# Patient Record
Sex: FEMALE | Race: WHITE | NOT HISPANIC OR LATINO | Employment: OTHER | URBAN - METROPOLITAN AREA
[De-identification: names, ages, dates, MRNs, and addresses within clinical notes are randomized per-mention and may not be internally consistent; named-entity substitution may affect disease eponyms.]

---

## 2018-06-22 ENCOUNTER — OFFICE VISIT (OUTPATIENT)
Dept: FAMILY MEDICINE CLINIC | Facility: CLINIC | Age: 63
End: 2018-06-22

## 2018-06-22 VITALS
BODY MASS INDEX: 21.67 KG/M2 | HEIGHT: 68 IN | TEMPERATURE: 98.4 F | SYSTOLIC BLOOD PRESSURE: 130 MMHG | WEIGHT: 143 LBS | HEART RATE: 76 BPM | OXYGEN SATURATION: 98 % | DIASTOLIC BLOOD PRESSURE: 72 MMHG | RESPIRATION RATE: 16 BRPM

## 2018-06-22 DIAGNOSIS — Z00.00 LABORATORY EXAMINATION ORDERED AS PART OF A ROUTINE GENERAL MEDICAL EXAMINATION: ICD-10-CM

## 2018-06-22 DIAGNOSIS — R09.89 ABDOMINAL BRUIT: Primary | ICD-10-CM

## 2018-06-22 DIAGNOSIS — Z12.31 ENCOUNTER FOR SCREENING MAMMOGRAM FOR BREAST CANCER: ICD-10-CM

## 2018-06-22 DIAGNOSIS — R09.89 BRUIT OF LEFT CAROTID ARTERY: ICD-10-CM

## 2018-06-22 DIAGNOSIS — M79.601 PAIN IN BOTH UPPER EXTREMITIES: ICD-10-CM

## 2018-06-22 DIAGNOSIS — M79.602 PAIN IN BOTH UPPER EXTREMITIES: ICD-10-CM

## 2018-06-22 DIAGNOSIS — R31.21 ASYMPTOMATIC MICROSCOPIC HEMATURIA: ICD-10-CM

## 2018-06-22 DIAGNOSIS — Z78.0 POST-MENOPAUSAL: ICD-10-CM

## 2018-06-22 DIAGNOSIS — Z72.0 TOBACCO ABUSE: ICD-10-CM

## 2018-06-22 PROCEDURE — 90472 IMMUNIZATION ADMIN EACH ADD: CPT | Performed by: PHYSICIAN ASSISTANT

## 2018-06-22 PROCEDURE — 90471 IMMUNIZATION ADMIN: CPT | Performed by: PHYSICIAN ASSISTANT

## 2018-06-22 PROCEDURE — 99204 OFFICE O/P NEW MOD 45 MIN: CPT | Performed by: PHYSICIAN ASSISTANT

## 2018-06-22 PROCEDURE — 90732 PPSV23 VACC 2 YRS+ SUBQ/IM: CPT | Performed by: PHYSICIAN ASSISTANT

## 2018-06-22 PROCEDURE — 90632 HEPA VACCINE ADULT IM: CPT | Performed by: PHYSICIAN ASSISTANT

## 2018-06-22 NOTE — PATIENT INSTRUCTIONS
Low glycemic index diet  Exercise 30 minutes most days of the week  Make sure you get results on any labs or tests we ordered today  We discussed medications and how to take them as prescribed  Sleep 6-8 hours each night if possible  If you have not signed up for [employer], please activate your code ASAP from your After Visit Summary today    LDL goal <100  LDL goal if heart disease <70  HDL goal >60  Triglyceride goal <150  BP goal =<130/80  Fasting glucose <100

## 2018-06-22 NOTE — PROGRESS NOTES
Subjective   Nelsy Stokes is a 63 y.o. female.     History of Present Illness   Nelsy Stokes 63 y.o. female who presents today for a new patient appointment.    she has a history of There is no problem list on file for this patient.  .  she is here to re-establish care I reviewed the PFSH recorded today by my MA/LPN staff.   she   She has been feeling tired.  Past A1C 5.9  I see notes in [facility]; saw Dr Harris  Saw abn pap 2011 and did see GYN and had work up  Labs last 10-8-11;  , HDL 71, creat .93, glucose 91, AST 15, ALT 13, TSH 1.87  hgb 15.5  Past EKG 9-23-13 and stress test Dr Null    DATE: 09/23/2013    CLINICAL INDICATION(S): Chest pain.     Resting EKG showed: Sinus rhythm. Minor ST segment changes.   1. This test was performed pharmacologically due to inability to walk on   treadmill.   2. The patient was infused with 0.4 milligrams of IV Lexiscan over 10-20   seconds per protocol.  3. The patient's maximum heart rate was 108 beats per minute with a   resting heart rate of 63 beats per minute.  4. Blood pressure fluctuated from 112/70 (resting BP) to 132/68 (peak BP).  5. Symptoms: The patient denied any complaints of chest discomfort   throughout the pharmacological test.   6. Arrhythmia: Occasional APC and PVC.   7. At peak infusion, no significant ST changes were noted from baseline   EKG.    IMPRESSION: Asymptomatic stress test without significant ST segment   evidence for ischemia.     MYOCARDIAL PERFUSION STUDY    TECHNIQUE: In order to evaluate Myocardial Perfusion, the following   combination of radionuclide(s) and exercise were utilized.    13 millicuries of Technetium-99m Sestamibi was injected intravenously at   rest and 38.4 millicuries of Technetium-99m Sestamibi was injected   intravenously at peak exercise. Images were then obtained, both at rest   and after stress, utilizing standard SPECT  (Tomographic) imaging techniques.    INTERPRETATION: The quality of the study is good.    Rotating raw data: Shows no motion artifact.     SPECT analysis: Shows normal left ventricular cavity size. There is   homogeneous uptake of Cardiolite in all regions of the myocardium with no   change noted between the rest and stress views.     Polar Maps: Show no fixed and no reversible perfusion defects.     Gated wall motion: Shows normal wall motion and normal wall thickening.     Post-stress resting ejection fraction: Gated analysis demonstrates a   post-stress resting ejection fraction of 73%.    IMPRESSION:   1. Normal IV Lexiscan myocardial perfusion study without evidence of   ischemia or infarction.   2. Normal systolic fResting EKG showed: Sinus rhythm. Minor ST segment changes. unction with normal ejection fraction 73%.      Resting EKG showed: Sinus rhythm. Minor ST segment changes.     She has sore arms and tender to touch; ROM not worse;  This is better off from work 6 weeks  No burn; no N/T; some improvement in strength--not much on exam and want her to see Dr Francis    Sore area right lumbar to touch and ROM    I will also get cpk  The following portions of the patient's history were reviewed and updated as appropriate: allergies, current medications, past family history, past medical history, past social history, past surgical history and problem list.    Review of Systems   Constitutional: Positive for fatigue. Negative for activity change, appetite change and unexpected weight change.   HENT: Negative for nosebleeds and trouble swallowing.    Eyes: Negative for pain and visual disturbance.   Respiratory: Positive for cough and shortness of breath. Negative for chest tightness and wheezing.    Cardiovascular: Negative for chest pain and palpitations.   Gastrointestinal: Positive for abdominal pain. Negative for blood in stool.   Endocrine: Negative.    Genitourinary: Negative for difficulty urinating and hematuria.   Musculoskeletal: Positive for arthralgias, back pain and myalgias.  Negative for joint swelling.   Skin: Negative for color change and rash.   Allergic/Immunologic: Negative.    Neurological: Positive for numbness. Negative for syncope and speech difficulty.   Hematological: Negative for adenopathy. Bruises/bleeds easily.   Psychiatric/Behavioral: Negative for agitation and confusion. The patient is nervous/anxious.    All other systems reviewed and are negative.      Objective   Physical Exam   Constitutional: She is oriented to person, place, and time. She appears well-developed and well-nourished. No distress.   HENT:   Head: Normocephalic and atraumatic.   Right Ear: External ear normal.   Left Ear: External ear normal.   Nose: Nose normal.   Mouth/Throat: Oropharynx is clear and moist.   Eyes: Conjunctivae and EOM are normal. Pupils are equal, round, and reactive to light. Right eye exhibits no discharge. Left eye exhibits no discharge. No scleral icterus.   Neck: Normal range of motion. Neck supple. No tracheal deviation present. No thyromegaly present.   Cardiovascular: Normal rate, regular rhythm, normal heart sounds, intact distal pulses and normal pulses.  Exam reveals no gallop.    No murmur heard.  Left carotid bruit    I hear bruit abd aorta also    Pulmonary/Chest: Effort normal and breath sounds normal. No respiratory distress. She has no wheezes. She has no rales.   Abdominal: Soft. Bowel sounds are normal. She exhibits no mass.   Musculoskeletal: Normal range of motion.   Lymphadenopathy:     She has no cervical adenopathy.   Neurological: She is alert and oriented to person, place, and time. She exhibits normal muscle tone. Coordination normal.   Skin: Skin is warm. No rash noted. No erythema. No pallor.   Psychiatric: She has a normal mood and affect. Her behavior is normal. Judgment and thought content normal.   Nursing note and vitals reviewed.      Assessment/Plan   Problems Addressed this Visit     None      Visit Diagnoses     Abdominal bruit    -  Primary     Relevant Orders    Comprehensive metabolic panel    Lipid panel    CBC and Differential    TSH    Urinalysis With Microscopic - Urine, Clean Catch    Vitamin B12    Folate    Vitamin D 25 Hydroxy    CK    Hemoglobin A1c    Mammo Screening Bilateral With CAD    DEXA Bone Density Axial    Duplex Carotid Ultrasound CAR    Duplex Abdominal Aorta & Iliac Artery Limited CAR    Bruit of left carotid artery        Relevant Orders    Comprehensive metabolic panel    Lipid panel    CBC and Differential    TSH    Urinalysis With Microscopic - Urine, Clean Catch    Vitamin B12    Folate    Vitamin D 25 Hydroxy    CK    Hemoglobin A1c    Mammo Screening Bilateral With CAD    DEXA Bone Density Axial    Duplex Carotid Ultrasound CAR    Duplex Abdominal Aorta & Iliac Artery Limited CAR    Laboratory examination ordered as part of a routine general medical examination        Relevant Orders    Comprehensive metabolic panel    Lipid panel    CBC and Differential    TSH    Urinalysis With Microscopic - Urine, Clean Catch    Vitamin B12    Folate    Vitamin D 25 Hydroxy    CK    Hemoglobin A1c    Mammo Screening Bilateral With CAD    DEXA Bone Density Axial    Duplex Carotid Ultrasound CAR    Duplex Abdominal Aorta & Iliac Artery Limited CAR    Pain in both upper extremities        Relevant Orders    Comprehensive metabolic panel    Lipid panel    CBC and Differential    TSH    Urinalysis With Microscopic - Urine, Clean Catch    Vitamin B12    Folate    Vitamin D 25 Hydroxy    CK    Hemoglobin A1c    Mammo Screening Bilateral With CAD    DEXA Bone Density Axial    Duplex Carotid Ultrasound CAR    Duplex Abdominal Aorta & Iliac Artery Limited CAR    Encounter for screening mammogram for breast cancer        Relevant Orders    Comprehensive metabolic panel    Lipid panel    CBC and Differential    TSH    Urinalysis With Microscopic - Urine, Clean Catch    Vitamin B12    Folate    Vitamin D 25 Hydroxy    CK    Hemoglobin A1c    Mammo  Screening Bilateral With CAD    DEXA Bone Density Axial    Duplex Carotid Ultrasound CAR    Duplex Abdominal Aorta & Iliac Artery Limited CAR    Post-menopausal        Relevant Orders    Comprehensive metabolic panel    Lipid panel    CBC and Differential    TSH    Urinalysis With Microscopic - Urine, Clean Catch    Vitamin B12    Folate    Vitamin D 25 Hydroxy    CK    Hemoglobin A1c    Mammo Screening Bilateral With CAD    DEXA Bone Density Axial    Duplex Carotid Ultrasound CAR    Duplex Abdominal Aorta & Iliac Artery Limited CAR    Tobacco abuse

## 2018-06-26 LAB
25(OH)D3+25(OH)D2 SERPL-MCNC: 44.5 NG/ML (ref 30–100)
ALBUMIN SERPL-MCNC: 4.3 G/DL (ref 3.5–5.2)
ALBUMIN/GLOB SERPL: 2.2 G/DL
ALP SERPL-CCNC: 80 U/L (ref 39–117)
ALT SERPL-CCNC: 17 U/L (ref 1–33)
APPEARANCE UR: (no result)
AST SERPL-CCNC: 14 U/L (ref 1–32)
BACTERIA #/AREA URNS HPF: ABNORMAL /HPF
BASOPHILS # BLD AUTO: 0.06 10*3/MM3 (ref 0–0.2)
BASOPHILS NFR BLD AUTO: 0.9 % (ref 0–1.5)
BILIRUB SERPL-MCNC: 0.8 MG/DL (ref 0.1–1.2)
BILIRUB UR QL STRIP: NEGATIVE
BUN SERPL-MCNC: 13 MG/DL (ref 8–23)
BUN/CREAT SERPL: 13.5 (ref 7–25)
CALCIUM SERPL-MCNC: 9.6 MG/DL (ref 8.6–10.5)
CASTS URNS MICRO: ABNORMAL
CHLORIDE SERPL-SCNC: 103 MMOL/L (ref 98–107)
CHOLEST SERPL-MCNC: 221 MG/DL (ref 0–200)
CK SERPL-CCNC: 77 U/L (ref 20–180)
CO2 SERPL-SCNC: 27.7 MMOL/L (ref 22–29)
COLOR UR: (no result)
CREAT SERPL-MCNC: 0.96 MG/DL (ref 0.57–1)
EOSINOPHIL # BLD AUTO: 0.13 10*3/MM3 (ref 0–0.7)
EOSINOPHIL NFR BLD AUTO: 1.9 % (ref 0.3–6.2)
EPI CELLS #/AREA URNS HPF: ABNORMAL /HPF
ERYTHROCYTE [DISTWIDTH] IN BLOOD BY AUTOMATED COUNT: 13.7 % (ref 11.7–13)
FOLATE SERPL-MCNC: 9.07 NG/ML (ref 4.78–24.2)
GLOBULIN SER CALC-MCNC: 2 GM/DL
GLUCOSE SERPL-MCNC: 93 MG/DL (ref 65–99)
GLUCOSE UR QL: NEGATIVE
HBA1C MFR BLD: 5.96 % (ref 4.8–5.6)
HCT VFR BLD AUTO: 48.1 % (ref 35.6–45.5)
HDLC SERPL-MCNC: 66 MG/DL (ref 40–60)
HGB BLD-MCNC: 15.9 G/DL (ref 11.9–15.5)
HGB UR QL STRIP: (no result)
IMM GRANULOCYTES # BLD: 0.01 10*3/MM3 (ref 0–0.03)
IMM GRANULOCYTES NFR BLD: 0.1 % (ref 0–0.5)
KETONES UR QL STRIP: NEGATIVE
LDLC SERPL CALC-MCNC: 144 MG/DL (ref 0–100)
LEUKOCYTE ESTERASE UR QL STRIP: (no result)
LYMPHOCYTES # BLD AUTO: 2.54 10*3/MM3 (ref 0.9–4.8)
LYMPHOCYTES NFR BLD AUTO: 37.5 % (ref 19.6–45.3)
MCH RBC QN AUTO: 32.3 PG (ref 26.9–32)
MCHC RBC AUTO-ENTMCNC: 33.1 G/DL (ref 32.4–36.3)
MCV RBC AUTO: 97.8 FL (ref 80.5–98.2)
MONOCYTES # BLD AUTO: 0.42 10*3/MM3 (ref 0.2–1.2)
MONOCYTES NFR BLD AUTO: 6.2 % (ref 5–12)
NEUTROPHILS # BLD AUTO: 3.63 10*3/MM3 (ref 1.9–8.1)
NEUTROPHILS NFR BLD AUTO: 53.5 % (ref 42.7–76)
NITRITE UR QL STRIP: NEGATIVE
PH UR STRIP: 7 [PH] (ref 5–8)
PLATELET # BLD AUTO: 308 10*3/MM3 (ref 140–500)
POTASSIUM SERPL-SCNC: 4.8 MMOL/L (ref 3.5–5.2)
PROT SERPL-MCNC: 6.3 G/DL (ref 6–8.5)
PROT UR QL STRIP: (no result)
RBC # BLD AUTO: 4.92 10*6/MM3 (ref 3.9–5.2)
RBC #/AREA URNS HPF: ABNORMAL /HPF
SODIUM SERPL-SCNC: 144 MMOL/L (ref 136–145)
SP GR UR: 1.02 (ref 1–1.03)
TRIGL SERPL-MCNC: 55 MG/DL (ref 0–150)
TSH SERPL DL<=0.005 MIU/L-ACNC: 1.9 MIU/ML (ref 0.27–4.2)
UROBILINOGEN UR STRIP-MCNC: (no result) MG/DL
VIT B12 SERPL-MCNC: 455 PG/ML (ref 211–946)
VLDLC SERPL CALC-MCNC: 11 MG/DL (ref 5–40)
WBC # BLD AUTO: 6.78 10*3/MM3 (ref 4.5–10.7)
WBC #/AREA URNS HPF: ABNORMAL /HPF

## 2018-06-27 NOTE — PROGRESS NOTES
PT states no UTI symptoms, she is having back pain but she also has 2 slipped disk. She also said she is having some side pain, both have been going on for a while

## 2018-07-06 ENCOUNTER — HOSPITAL ENCOUNTER (OUTPATIENT)
Dept: ULTRASOUND IMAGING | Facility: HOSPITAL | Age: 63
Discharge: HOME | End: 2018-07-06

## 2018-07-06 ENCOUNTER — APPOINTMENT (OUTPATIENT)
Dept: CARDIOLOGY | Facility: HOSPITAL | Age: 63
End: 2018-07-06

## 2018-07-06 ENCOUNTER — HOSPITAL ENCOUNTER (OUTPATIENT)
Dept: CARDIOLOGY | Facility: HOSPITAL | Age: 63
Discharge: HOME | End: 2018-07-06

## 2018-07-06 ENCOUNTER — HOSPITAL ENCOUNTER (OUTPATIENT)
Dept: BONE DENSITY | Facility: HOSPITAL | Age: 63
Discharge: HOME | End: 2018-07-06

## 2018-07-06 ENCOUNTER — HOSPITAL ENCOUNTER (OUTPATIENT)
Dept: MAMMOGRAPHY | Facility: HOSPITAL | Age: 63
Discharge: HOME | End: 2018-07-06
Admitting: PHYSICIAN ASSISTANT

## 2018-07-06 DIAGNOSIS — Z00.00 LABORATORY EXAMINATION ORDERED AS PART OF A ROUTINE GENERAL MEDICAL EXAMINATION: ICD-10-CM

## 2018-07-06 DIAGNOSIS — Z78.0 POST-MENOPAUSAL: ICD-10-CM

## 2018-07-06 DIAGNOSIS — R09.89 ABDOMINAL BRUIT: ICD-10-CM

## 2018-07-06 DIAGNOSIS — M79.602 PAIN IN BOTH UPPER EXTREMITIES: ICD-10-CM

## 2018-07-06 DIAGNOSIS — M79.601 PAIN IN BOTH UPPER EXTREMITIES: ICD-10-CM

## 2018-07-06 DIAGNOSIS — Z12.31 ENCOUNTER FOR SCREENING MAMMOGRAM FOR BREAST CANCER: ICD-10-CM

## 2018-07-06 DIAGNOSIS — Z72.0 TOBACCO ABUSE: ICD-10-CM

## 2018-07-06 DIAGNOSIS — R09.89 BRUIT OF LEFT CAROTID ARTERY: ICD-10-CM

## 2018-07-06 DIAGNOSIS — I65.23 BILATERAL CAROTID ARTERY STENOSIS: Primary | ICD-10-CM

## 2018-07-06 LAB
BH CV XLRA MEAS LEFT CCA RATIO VEL: 53.1 CM/SEC
BH CV XLRA MEAS LEFT DIST CCA EDV: 10.9 CM/SEC
BH CV XLRA MEAS LEFT DIST CCA PSV: 53.1 CM/SEC
BH CV XLRA MEAS LEFT DIST ICA EDV: -21.2 CM/SEC
BH CV XLRA MEAS LEFT DIST ICA PSV: -66.5 CM/SEC
BH CV XLRA MEAS LEFT ICA RATIO VEL: -443 CM/SEC
BH CV XLRA MEAS LEFT ICA/CCA RATIO: -8.3
BH CV XLRA MEAS LEFT MID ICA EDV: -47.4 CM/SEC
BH CV XLRA MEAS LEFT MID ICA PSV: -193 CM/SEC
BH CV XLRA MEAS LEFT PROX CCA EDV: 11.3 CM/SEC
BH CV XLRA MEAS LEFT PROX CCA PSV: 75 CM/SEC
BH CV XLRA MEAS LEFT PROX ECA EDV: -8.5 CM/SEC
BH CV XLRA MEAS LEFT PROX ECA PSV: -90.6 CM/SEC
BH CV XLRA MEAS LEFT PROX ICA EDV: -177 CM/SEC
BH CV XLRA MEAS LEFT PROX ICA PSV: -443 CM/SEC
BH CV XLRA MEAS LEFT PROX SCLA PSV: 72.9 CM/SEC
BH CV XLRA MEAS LEFT VERTEBRAL A PSV: -81.4 CM/SEC
BH CV XLRA MEAS RIGHT CCA RATIO VEL: 82.1 CM/SEC
BH CV XLRA MEAS RIGHT DIST CCA EDV: 19.1 CM/SEC
BH CV XLRA MEAS RIGHT DIST CCA PSV: 82.1 CM/SEC
BH CV XLRA MEAS RIGHT DIST ICA EDV: -27.6 CM/SEC
BH CV XLRA MEAS RIGHT DIST ICA PSV: -102 CM/SEC
BH CV XLRA MEAS RIGHT ICA RATIO VEL: -110 CM/SEC
BH CV XLRA MEAS RIGHT ICA/CCA RATIO: -1.3
BH CV XLRA MEAS RIGHT MID ICA PSV: -103 CM/SEC
BH CV XLRA MEAS RIGHT PROX CCA EDV: 19.8 CM/SEC
BH CV XLRA MEAS RIGHT PROX CCA PSV: 94.8 CM/SEC
BH CV XLRA MEAS RIGHT PROX ECA EDV: -5.7 CM/SEC
BH CV XLRA MEAS RIGHT PROX ECA PSV: -80 CM/SEC
BH CV XLRA MEAS RIGHT PROX ICA EDV: -31.1 CM/SEC
BH CV XLRA MEAS RIGHT PROX ICA PSV: -110 CM/SEC
BH CV XLRA MEAS RIGHT PROX SCLA PSV: 133 CM/SEC
BH CV XLRA MEAS RIGHT VERTEBRAL A EDV: 29.6 CM/SEC
BH CV XLRA MEAS RIGHT VERTEBRAL A PSV: 148 CM/SEC
LEFT ARM BP: NORMAL MMHG
RIGHT ARM BP: NORMAL MMHG

## 2018-07-06 PROCEDURE — 77067 SCR MAMMO BI INCL CAD: CPT

## 2018-07-06 PROCEDURE — 77080 DXA BONE DENSITY AXIAL: CPT

## 2018-07-06 PROCEDURE — 76775 US EXAM ABDO BACK WALL LIM: CPT

## 2018-07-06 PROCEDURE — 93880 EXTRACRANIAL BILAT STUDY: CPT

## 2018-07-06 NOTE — PROGRESS NOTES
Bilateral carotid doppler complete and preliminary is positive for severe left carotid disease and mild right carotid disease.to Dr. Harris pt. To f/u with the office next week . Pt released asymptomatic lt carotid bruit

## 2018-07-08 PROBLEM — R31.21 ASYMPTOMATIC MICROSCOPIC HEMATURIA: Status: ACTIVE | Noted: 2018-07-08

## 2018-07-08 LAB
APPEARANCE UR: CLEAR
BACTERIA #/AREA URNS HPF: ABNORMAL /HPF
BACTERIA UR CULT: NORMAL
BACTERIA UR CULT: NORMAL
BILIRUB UR QL STRIP: NEGATIVE
CASTS URNS MICRO: ABNORMAL
COLOR UR: YELLOW
EPI CELLS #/AREA URNS HPF: ABNORMAL /HPF
GLUCOSE UR QL: NEGATIVE
HGB UR QL STRIP: (no result)
KETONES UR QL STRIP: NEGATIVE
LEUKOCYTE ESTERASE UR QL STRIP: (no result)
NITRITE UR QL STRIP: NEGATIVE
PH UR STRIP: 7.5 [PH] (ref 5–8)
PROT UR QL STRIP: NEGATIVE
RBC #/AREA URNS HPF: ABNORMAL /HPF
SP GR UR: 1.02 (ref 1–1.03)
UROBILINOGEN UR STRIP-MCNC: (no result) MG/DL
WBC #/AREA URNS HPF: ABNORMAL /HPF

## 2018-07-12 ENCOUNTER — TRANSCRIBE ORDERS (OUTPATIENT)
Dept: ADMINISTRATIVE | Facility: HOSPITAL | Age: 63
End: 2018-07-12

## 2018-07-12 DIAGNOSIS — I65.29 CAROTID ARTERY STENOSIS, UNILATERAL: Primary | ICD-10-CM

## 2018-07-19 ENCOUNTER — HOSPITAL ENCOUNTER (OUTPATIENT)
Dept: CT IMAGING | Facility: HOSPITAL | Age: 63
Discharge: HOME | End: 2018-07-19
Attending: SURGERY | Admitting: SURGERY

## 2018-07-19 DIAGNOSIS — I65.29 CAROTID ARTERY STENOSIS, UNILATERAL: ICD-10-CM

## 2018-07-19 LAB — CREAT BLDA-MCNC: 0.9 MG/DL (ref 0.6–1.3)

## 2018-07-19 PROCEDURE — 70496 CT ANGIOGRAPHY HEAD: CPT

## 2018-07-19 PROCEDURE — 70498 CT ANGIOGRAPHY NECK: CPT

## 2018-07-19 PROCEDURE — 25010000002 IOPAMIDOL 61 % SOLUTION: Performed by: SURGERY

## 2018-07-19 PROCEDURE — 82565 ASSAY OF CREATININE: CPT

## 2018-07-19 RX ADMIN — IOPAMIDOL 95 ML: 612 INJECTION, SOLUTION INTRAVENOUS at 09:45

## 2018-07-20 ENCOUNTER — OFFICE VISIT (OUTPATIENT)
Dept: FAMILY MEDICINE CLINIC | Facility: CLINIC | Age: 63
End: 2018-07-20

## 2018-07-20 VITALS
OXYGEN SATURATION: 99 % | HEART RATE: 74 BPM | RESPIRATION RATE: 16 BRPM | TEMPERATURE: 98.6 F | HEIGHT: 68 IN | SYSTOLIC BLOOD PRESSURE: 110 MMHG | BODY MASS INDEX: 21.98 KG/M2 | WEIGHT: 145 LBS | DIASTOLIC BLOOD PRESSURE: 70 MMHG

## 2018-07-20 DIAGNOSIS — E78.2 MIXED HYPERLIPIDEMIA: Primary | ICD-10-CM

## 2018-07-20 DIAGNOSIS — M85.89 OSTEOPENIA OF MULTIPLE SITES: ICD-10-CM

## 2018-07-20 DIAGNOSIS — I73.9 PAD (PERIPHERAL ARTERY DISEASE) (HCC): ICD-10-CM

## 2018-07-20 DIAGNOSIS — R31.21 ASYMPTOMATIC MICROSCOPIC HEMATURIA: ICD-10-CM

## 2018-07-20 DIAGNOSIS — I77.9 BILATERAL CAROTID ARTERY DISEASE (HCC): ICD-10-CM

## 2018-07-20 PROBLEM — I77.1 SUBCLAVIAN ARTERIAL STENOSIS: Status: ACTIVE | Noted: 2018-07-20

## 2018-07-20 PROCEDURE — 99213 OFFICE O/P EST LOW 20 MIN: CPT | Performed by: PHYSICIAN ASSISTANT

## 2018-07-20 PROCEDURE — 90750 HZV VACC RECOMBINANT IM: CPT | Performed by: PHYSICIAN ASSISTANT

## 2018-07-20 PROCEDURE — 90471 IMMUNIZATION ADMIN: CPT | Performed by: PHYSICIAN ASSISTANT

## 2018-07-20 RX ORDER — CLOPIDOGREL BISULFATE 75 MG/1
75 TABLET ORAL DAILY
COMMUNITY
End: 2020-02-27 | Stop reason: SINTOL

## 2018-07-20 RX ORDER — ROSUVASTATIN CALCIUM 10 MG/1
10 TABLET, COATED ORAL DAILY
Qty: 30 TABLET | Refills: 11 | Status: SHIPPED | OUTPATIENT
Start: 2018-07-20 | End: 2018-08-15 | Stop reason: SINTOL

## 2018-07-20 NOTE — PROGRESS NOTES
Subjective   Nelsy Stokes is a 63 y.o. female.     History of Present Illness   Nelsy Stokes 63 y.o. female who presents today for routine follow up check and medication refills.  she has a history of   Patient Active Problem List   Diagnosis   • Asymptomatic microscopic hematuria   • Subclavian arterial stenosis (CMS/HCC)   • PAD (peripheral artery disease) (CMS/HCC)   • Bilateral carotid artery disease (CMS/HCC)   • Mixed hyperlipidemia   • Osteopenia of multiple sites   .  Since the last visit, she has overall felt fairly well.  She has Hyperlipidemia and here to discuss treatment.  she has been compliant with current medications have reviewed them.  The patient denies medication side effects.    Results for orders placed or performed during the hospital encounter of 18   POC Creatinine   Result Value Ref Range    Creatinine 0.90 0.60 - 1.30 mg/dL       I see notes from DR. Leong and did fax him to abd aorta doppler results that are not in his note.  Also eval carotid disease and PAD lower ext; has appt 18;  He wants me to start a statin and watch her bp.  I did labs and her urine was repeated and showed blood and to see Dr Norton.  She is smoker;  Dad  and much going on now.  Lab Results   Component Value Date    CHLPL 221 (H) 2018    TRIG 55 2018    HDL 66 (H) 2018     (H) 2018     Lab Results   Component Value Date    BUN 13 2018    CREATININE 0.90 2018    EGFRIFNONA 59 (L) 2018    EGFRIFAFRI 71 2018    BCR 13.5 2018    K 4.8 2018    CO2 27.7 2018    CALCIUM 9.6 2018    PROTENTOTREF 6.3 2018    ALBUMIN 4.30 2018    LABIL2 2.2 2018    AST 14 2018    ALT 17 2018     Lab Results   Component Value Date    HGB 15.9 (H) 2018    HCT 48.1 (H) 2018    MCV 97.8 2018     2018     Lab Results   Component Value Date    TSH 1.900 2018     Dr. Leong stopped ASA  d/t bruising and on Plavix; still bruising  Will consider colonoscopy  Just had mammo  Talked about low dose CT scan lung  Also needs pap  DEXA showed Osteopenia and do not want to Rx Evista with her vascular issues;  Want her to read about Fosamax and DEXA in one year to check  The following portions of the patient's history were reviewed and updated as appropriate: allergies, current medications, past family history, past medical history, past social history, past surgical history and problem list.    Review of Systems   Constitutional: Negative for activity change, appetite change and unexpected weight change.   HENT: Negative for nosebleeds and trouble swallowing.    Eyes: Negative for pain and visual disturbance.   Respiratory: Negative for chest tightness, shortness of breath and wheezing.    Cardiovascular: Negative for chest pain and palpitations.   Gastrointestinal: Negative for abdominal pain and blood in stool.   Endocrine: Negative.    Genitourinary: Negative for difficulty urinating and hematuria.   Musculoskeletal: Negative for joint swelling.   Skin: Negative for color change and rash.   Allergic/Immunologic: Negative.    Neurological: Negative for syncope and speech difficulty.   Hematological: Negative for adenopathy.   Psychiatric/Behavioral: Negative for agitation and confusion.   All other systems reviewed and are negative.      Objective   Physical Exam   Constitutional: She is oriented to person, place, and time. She appears well-developed and well-nourished. No distress.   HENT:   Head: Normocephalic and atraumatic.   Eyes: Pupils are equal, round, and reactive to light. Conjunctivae and EOM are normal. Right eye exhibits no discharge. Left eye exhibits no discharge. No scleral icterus.   Neck: Normal range of motion. Neck supple. No tracheal deviation present. No thyromegaly present.   Cardiovascular: Normal rate, regular rhythm, normal heart sounds, intact distal pulses and normal pulses.   Exam reveals no gallop.    No murmur heard.  Left carotid bruit        Pulmonary/Chest: Effort normal and breath sounds normal. No respiratory distress. She has no wheezes. She has no rales.   Abdominal: Soft. Bowel sounds are normal. She exhibits no mass.   Lymphadenopathy:     She has no cervical adenopathy.   Neurological: She is alert and oriented to person, place, and time. She exhibits normal muscle tone. Coordination normal.   Skin: Skin is warm. No rash noted. No erythema. No pallor.   Psychiatric: Her behavior is normal. Judgment and thought content normal.   Nursing note and vitals reviewed.      Assessment/Plan   Nelsy was seen today for anxiety.    Diagnoses and all orders for this visit:    Mixed hyperlipidemia  -     Comprehensive metabolic panel; Future  -     Lipid panel; Future  -     Hepatitis C Antibody; Future    Asymptomatic microscopic hematuria  -     Comprehensive metabolic panel; Future  -     Lipid panel; Future  -     Hepatitis C Antibody; Future    Bilateral carotid artery disease (CMS/HCC)  -     Comprehensive metabolic panel; Future  -     Lipid panel; Future  -     Hepatitis C Antibody; Future    PAD (peripheral artery disease) (CMS/HCC)  -     Comprehensive metabolic panel; Future  -     Lipid panel; Future  -     Hepatitis C Antibody; Future    Osteopenia of multiple sites    Other orders  -     Shingrix Vaccine  -     rosuvastatin (CRESTOR) 10 MG tablet; Take 1 tablet by mouth Daily. For cholesterol

## 2018-07-20 NOTE — PATIENT INSTRUCTIONS
Low glycemic index diet  Exercise 30 minutes most days of the week  Make sure you get results on any labs or tests we ordered today  We discussed medications and how to take them as prescribed  Sleep 6-8 hours each night if possible  If you have not signed up for [employer], please activate your code ASAP from your After Visit Summary today    LDL goal <100  LDL goal if heart disease <70  HDL goal >60  Triglyceride goal <150  BP goal =<130/80  Fasting glucose <100

## 2018-07-30 ENCOUNTER — TELEPHONE (OUTPATIENT)
Dept: FAMILY MEDICINE CLINIC | Facility: CLINIC | Age: 63
End: 2018-07-30

## 2018-07-30 NOTE — TELEPHONE ENCOUNTER
Pt was in to see you on 7/20/18.. She is having increase stress and trouble sleeping. She is wanting to know if you will give her something for it.

## 2018-08-03 ENCOUNTER — OFFICE VISIT (OUTPATIENT)
Dept: FAMILY MEDICINE CLINIC | Facility: CLINIC | Age: 63
End: 2018-08-03

## 2018-08-03 VITALS
HEART RATE: 61 BPM | SYSTOLIC BLOOD PRESSURE: 120 MMHG | OXYGEN SATURATION: 98 % | RESPIRATION RATE: 16 BRPM | WEIGHT: 145 LBS | DIASTOLIC BLOOD PRESSURE: 80 MMHG | TEMPERATURE: 98 F | HEIGHT: 68 IN | BODY MASS INDEX: 21.98 KG/M2

## 2018-08-03 DIAGNOSIS — G47.00 INSOMNIA, UNSPECIFIED TYPE: ICD-10-CM

## 2018-08-03 DIAGNOSIS — I73.9 PAD (PERIPHERAL ARTERY DISEASE) (HCC): ICD-10-CM

## 2018-08-03 DIAGNOSIS — F41.9 ANXIETY: Primary | ICD-10-CM

## 2018-08-03 DIAGNOSIS — M85.89 OSTEOPENIA OF MULTIPLE SITES: ICD-10-CM

## 2018-08-03 PROCEDURE — 99214 OFFICE O/P EST MOD 30 MIN: CPT | Performed by: PHYSICIAN ASSISTANT

## 2018-08-03 RX ORDER — HYDROXYZINE PAMOATE 25 MG/1
CAPSULE ORAL
Qty: 60 CAPSULE | Refills: 1 | Status: SHIPPED | OUTPATIENT
Start: 2018-08-03 | End: 2020-02-11

## 2018-08-03 RX ORDER — ALENDRONATE SODIUM 70 MG/1
70 TABLET ORAL
Qty: 5 TABLET | Refills: 11 | Status: SHIPPED | OUTPATIENT
Start: 2018-08-03 | End: 2019-03-01 | Stop reason: SDUPTHER

## 2018-08-03 NOTE — PROGRESS NOTES
Subjective   Nelsy Stokes is a 63 y.o. female.     History of Present Illness   Nelsy Stokes female 63 y.o. who presents today for new eval of Insomnia and Anxiety.  She reports anxiety and sleep disturbance. Onset of symptoms was approximately several months ago.  She denies current suicidal and homicidal ideation. Risk factors are lifestyle of multiple roles and chronic illness.  Previous treatment includes none.  She complains of the following medication side effects: none.  The patient has had no previous counseling.. She does not have depression and anxiety seems situational.  Just not sleeping. I will have her try Vistaril for both  Grief over Dad dying  Stress with ? What to do on carotid disease and 90% occulusion  Sees DR Leong  I can try Trazodone if no help with Vistaril  Sister has Osteoporosis---she is on Fosamax;  Her DEXA was -2.1 left hip and will start Rx    Saw vascular last week  She is taking Crestor and Plavix;  I do have f/u labs planned        The following portions of the patient's history were reviewed and updated as appropriate: allergies, current medications, past family history, past medical history, past social history, past surgical history and problem list.    Review of Systems   Constitutional: Negative for activity change, appetite change and unexpected weight change.   HENT: Negative for nosebleeds and trouble swallowing.    Eyes: Negative for pain and visual disturbance.   Respiratory: Negative for chest tightness, shortness of breath and wheezing.    Cardiovascular: Negative for chest pain and palpitations.   Gastrointestinal: Negative for abdominal pain and blood in stool.   Endocrine: Negative.    Genitourinary: Negative for difficulty urinating and hematuria.   Musculoskeletal: Negative for joint swelling.   Skin: Negative for color change and rash.   Allergic/Immunologic: Negative.    Neurological: Negative for syncope and speech difficulty.   Hematological: Negative for  adenopathy.   Psychiatric/Behavioral: Negative for agitation and confusion.   All other systems reviewed and are negative.      Objective   Physical Exam   Constitutional: She is oriented to person, place, and time. She appears well-developed and well-nourished. No distress.   HENT:   Head: Normocephalic and atraumatic.   Eyes: Pupils are equal, round, and reactive to light. Conjunctivae and EOM are normal. Right eye exhibits no discharge. Left eye exhibits no discharge. No scleral icterus.   Neck: Normal range of motion. Neck supple. No tracheal deviation present. No thyromegaly present.   Cardiovascular: Normal rate, regular rhythm, normal heart sounds, intact distal pulses and normal pulses.  Exam reveals no gallop.    No murmur heard.  Left carotid bruit        Pulmonary/Chest: Effort normal and breath sounds normal. No respiratory distress. She has no wheezes. She has no rales.   Abdominal: Soft. Bowel sounds are normal. She exhibits no mass.   Lymphadenopathy:     She has no cervical adenopathy.   Neurological: She is alert and oriented to person, place, and time. She exhibits normal muscle tone. Coordination normal.   Skin: Skin is warm. No rash noted. No erythema. No pallor.   Psychiatric: Her behavior is normal. Judgment and thought content normal.   Nursing note and vitals reviewed.      Assessment/Plan   Problems Addressed this Visit        Cardiovascular and Mediastinum    PAD (peripheral artery disease) (CMS/HCC)       Musculoskeletal and Integument    Osteopenia of multiple sites      Other Visit Diagnoses     Anxiety    -  Primary    Insomnia, unspecified type

## 2018-08-03 NOTE — PATIENT INSTRUCTIONS
Low glycemic index diet  Exercise 30 minutes most days of the week  Make sure you get results on any labs or tests we ordered today  We discussed medications and how to take them as prescribed  Sleep 6-8 hours each night if possible  If you have not signed up for [employer], please activate your code ASAP from your After Visit Summary today    LDL goal <100  LDL goal if heart disease <70  HDL goal >60  Triglyceride goal <150  BP goal =<130/80  Fasting glucose <100    Warned patient that this medication can cause drowsiness and impair them operating machinery, including driving a car.  Caution is advised.

## 2018-08-15 ENCOUNTER — TELEPHONE (OUTPATIENT)
Dept: FAMILY MEDICINE CLINIC | Facility: CLINIC | Age: 63
End: 2018-08-15

## 2018-08-15 NOTE — TELEPHONE ENCOUNTER
Stop Crestor and have her try Livalo and see if tolerate; if not or $$, I will at least want to Rx Zetia; labs as planned

## 2018-10-11 NOTE — PROGRESS NOTES
Need to ask Dr Leong about stop and hold Plavix for 5 days to have colonoscopy?  Will send him message and hope he answers back.

## 2018-10-12 ENCOUNTER — RESULTS ENCOUNTER (OUTPATIENT)
Dept: FAMILY MEDICINE CLINIC | Facility: CLINIC | Age: 63
End: 2018-10-12

## 2018-10-12 DIAGNOSIS — R31.21 ASYMPTOMATIC MICROSCOPIC HEMATURIA: ICD-10-CM

## 2018-10-12 DIAGNOSIS — I73.9 PAD (PERIPHERAL ARTERY DISEASE) (HCC): ICD-10-CM

## 2018-10-12 DIAGNOSIS — I77.9 BILATERAL CAROTID ARTERY DISEASE (HCC): ICD-10-CM

## 2018-10-12 DIAGNOSIS — E78.2 MIXED HYPERLIPIDEMIA: ICD-10-CM

## 2018-10-19 LAB
ALBUMIN SERPL-MCNC: 4.7 G/DL (ref 3.5–5.2)
ALBUMIN/GLOB SERPL: 2 G/DL
ALP SERPL-CCNC: 86 U/L (ref 39–117)
ALT SERPL-CCNC: 20 U/L (ref 1–33)
AST SERPL-CCNC: 16 U/L (ref 1–32)
BILIRUB SERPL-MCNC: 0.7 MG/DL (ref 0.1–1.2)
BUN SERPL-MCNC: 12 MG/DL (ref 8–23)
BUN/CREAT SERPL: 13.2 (ref 7–25)
CALCIUM SERPL-MCNC: 9.6 MG/DL (ref 8.6–10.5)
CHLORIDE SERPL-SCNC: 103 MMOL/L (ref 98–107)
CHOLEST SERPL-MCNC: 202 MG/DL (ref 0–200)
CO2 SERPL-SCNC: 25.8 MMOL/L (ref 22–29)
CREAT SERPL-MCNC: 0.91 MG/DL (ref 0.57–1)
GLOBULIN SER CALC-MCNC: 2.3 GM/DL
GLUCOSE SERPL-MCNC: 96 MG/DL (ref 65–99)
HCV AB S/CO SERPL IA: <0.1 S/CO RATIO (ref 0–0.9)
HDLC SERPL-MCNC: 63 MG/DL (ref 40–60)
LDLC SERPL CALC-MCNC: 126 MG/DL (ref 0–100)
POTASSIUM SERPL-SCNC: 4.6 MMOL/L (ref 3.5–5.2)
PROT SERPL-MCNC: 7 G/DL (ref 6–8.5)
SODIUM SERPL-SCNC: 145 MMOL/L (ref 136–145)
TRIGL SERPL-MCNC: 65 MG/DL (ref 0–150)
VLDLC SERPL CALC-MCNC: 13 MG/DL (ref 5–40)

## 2018-10-23 ENCOUNTER — OFFICE VISIT (OUTPATIENT)
Dept: CARDIOLOGY | Facility: CLINIC | Age: 63
End: 2018-10-23

## 2018-10-23 VITALS
SYSTOLIC BLOOD PRESSURE: 120 MMHG | DIASTOLIC BLOOD PRESSURE: 82 MMHG | HEART RATE: 78 BPM | HEIGHT: 69 IN | WEIGHT: 155 LBS | BODY MASS INDEX: 22.96 KG/M2

## 2018-10-23 DIAGNOSIS — R94.31 ABNORMAL ECG: ICD-10-CM

## 2018-10-23 DIAGNOSIS — I65.23 BILATERAL CAROTID ARTERY STENOSIS: ICD-10-CM

## 2018-10-23 DIAGNOSIS — I70.0 AORTIC ATHEROSCLEROSIS (HCC): Chronic | ICD-10-CM

## 2018-10-23 DIAGNOSIS — I73.9 PAD (PERIPHERAL ARTERY DISEASE) (HCC): ICD-10-CM

## 2018-10-23 DIAGNOSIS — R06.02 SOB (SHORTNESS OF BREATH): ICD-10-CM

## 2018-10-23 DIAGNOSIS — Z01.810 PREOP CARDIOVASCULAR EXAM: Primary | ICD-10-CM

## 2018-10-23 DIAGNOSIS — I77.1 SUBCLAVIAN ARTERIAL STENOSIS (HCC): ICD-10-CM

## 2018-10-23 PROBLEM — I77.9 BILATERAL CAROTID ARTERY DISEASE: Chronic | Status: ACTIVE | Noted: 2018-07-20

## 2018-10-23 PROCEDURE — 93000 ELECTROCARDIOGRAM COMPLETE: CPT | Performed by: INTERNAL MEDICINE

## 2018-10-23 PROCEDURE — 99204 OFFICE O/P NEW MOD 45 MIN: CPT | Performed by: INTERNAL MEDICINE

## 2018-10-23 NOTE — PROGRESS NOTES
Subjective:     Encounter Date:10/23/2018      Patient ID: Nelsy Stokes is a 63 y.o. female.    Chief Complaint: preop cards, CVD  History of Present Illness    Dear Dr. Leong,    I had the pleasure of seeing this patient in the office today for initial evaluation and consultation.  I appreciate the you sent her to see us.  She comes in for assessment of cardiac risk prior to cerebrovascular surgery-she has severe left carotid stenosis.    She is not known to have any prior coronary artery disease.  She's been found to have bilateral cerebrovascular disease, subclavian arterial disease, and abdominal aortic moderate to severe atherosclerotic disease.  She does get short of breath easily with activity, she is thought that is probably because of her smoking.  She sometimes will get some jaw and throat discomfort, sometimes but not always associated when she is up and walking.  She's also had some pain between her shoulder blades that happens intermittently at times.  She has not noticed that it or takes a correlate with activity or exercise.  She does she know she has disc disease and thought that that was probably the cause.  She does not have any problem with indigestion or heartburn.  She does fatigue easily and that's been getting worse.  She does not have any shortness of breath at rest.  No orthopnea or PND.  Says she could probably walk at a steady pace for a couple of football fields but she really doesn't do any exercise now so she's not sure.  If she starts walking up a hill or stairs she gets short of breath very quickly.    This patient has not experienced any feeling of palpitations, tachycardia or heart racing and no presyncope or syncope.  There has not been any problems with dizziness or lighthead This patient has not been recently hospitalized for any reason.    This patient has no known cardiac history.  This patient has no history of coronary artery disease, congestive heart failure, rheumatic  fever, rheumatic heart disease, congenital heart disease or heart murmur.  This patient has never required invasive cardiovascular evaluation.    The following portions of the patient's history were reviewed and updated as appropriate: allergies, current medications, past family history, past medical history, past social history, past surgical history and problem list.    Past Medical History:   Diagnosis Date   • Acid reflux    • Anxiety    • Bradycardia    • Carotid artery disease (CMS/HCC)    • Carotid artery stenosis    • Chest pain    • Claudication (CMS/HCC)    • Hyperlipidemia    • Osteopenia    • Palpitations    • PVD (peripheral vascular disease) (CMS/HCC)    • Subclavian artery stenosis (CMS/HCC)    • Tobacco abuse        Past Surgical History:   Procedure Laterality Date   • KNEE SURGERY         Social History     Social History   • Marital status:      Spouse name: N/A   • Number of children: N/A   • Years of education: N/A     Occupational History   • Not on file.     Social History Main Topics   • Smoking status: Current Every Day Smoker     Packs/day: 1.00     Types: Cigarettes   • Smokeless tobacco: Never Used      Comment: caff use   • Alcohol use No   • Drug use: No   • Sexual activity: Yes     Partners: Male      Comment:      Other Topics Concern   • Not on file     Social History Narrative   • No narrative on file       Review of Systems   Constitution: Negative for chills, decreased appetite, fever and night sweats.   HENT: Negative for ear discharge, ear pain, hearing loss, nosebleeds and sore throat.    Eyes: Negative for blurred vision, double vision and pain.   Cardiovascular: Negative for cyanosis.   Respiratory: Negative for hemoptysis and sputum production.    Endocrine: Negative for cold intolerance and heat intolerance.   Hematologic/Lymphatic: Negative for adenopathy.   Skin: Negative for dry skin, itching, nail changes, rash and suspicious lesions.   Musculoskeletal:  "Negative for arthritis, gout, muscle cramps, muscle weakness, myalgias and neck pain.   Gastrointestinal: Negative for anorexia, bowel incontinence, constipation, diarrhea, dysphagia, hematemesis and jaundice.   Genitourinary: Negative for bladder incontinence, dysuria, flank pain, frequency, hematuria and nocturia.   Neurological: Negative for focal weakness, numbness, paresthesias and seizures.   Psychiatric/Behavioral: Negative for altered mental status, hallucinations, hypervigilance, suicidal ideas and thoughts of violence.   Allergic/Immunologic: Negative for persistent infections.         ECG 12 Lead  Date/Time: 10/23/2018 8:44 AM  Performed by: ANTHONY MARCOS III  Authorized by: ANTHONY MARCOS III   Comparison: compared with previous ECG   Similar to previous ECG  Rhythm: sinus rhythm  Rate: normal  Conduction: conduction normal  ST Segments: ST segments normal  T Waves: T waves normal  QRS axis: normal  Other findings: PRWP  Q waves: V3  Clinical impression: abnormal ECG               Objective:     Vitals:    10/23/18 0829   BP: 120/82   Pulse: 78   Weight: 70.3 kg (155 lb)   Height: 175.3 cm (69\")         Physical Exam   Constitutional: She is oriented to person, place, and time. She appears well-developed and well-nourished. No distress.   HENT:   Head: Normocephalic and atraumatic.   Nose: Nose normal.   Mouth/Throat: Oropharynx is clear and moist.   Eyes: Pupils are equal, round, and reactive to light. Conjunctivae and EOM are normal. Right eye exhibits no discharge. Left eye exhibits no discharge.   Neck: Normal range of motion. Neck supple. No tracheal deviation present. No thyromegaly present.   Cardiovascular: Normal rate, regular rhythm, S1 normal, S2 normal, normal heart sounds and normal pulses.  Exam reveals no S3.    Pulmonary/Chest: Effort normal and breath sounds normal. No stridor. No respiratory distress. She exhibits no tenderness.   Abdominal: Soft. Bowel sounds are normal. She " exhibits no distension and no mass. There is no tenderness. There is no rebound and no guarding.   Musculoskeletal: Normal range of motion. She exhibits no tenderness or deformity.   Lymphadenopathy:     She has no cervical adenopathy.   Neurological: She is alert and oriented to person, place, and time. She has normal reflexes.   Skin: Skin is warm and dry. No rash noted. She is not diaphoretic. No erythema.   Psychiatric: She has a normal mood and affect. Thought content normal.       Lab Review:     Results from last 7 days  Lab Units 10/18/18  0915   SODIUM mmol/L 145   POTASSIUM mmol/L 4.6   CHLORIDE mmol/L 103   TOTAL CO2, ARTERIAL mmol/L 25.8   BUN mg/dL 12   CREATININE mg/dL 0.91   CALCIUM mg/dL 9.6       Results from last 7 days  Lab Units 10/18/18  0915   TRIGLYCERIDES mg/dL 65   HDL CHOL mg/dL 63*   LDL CHOL mg/dL 126*       Performed        Assessment:          Diagnosis Plan   1. Preop cardiovascular exam  ECG 12 Lead    Stress Test With Myocardial Perfusion One Day   2. PAD (peripheral artery disease) (CMS/HCC)  ECG 12 Lead    Stress Test With Myocardial Perfusion One Day   3. Bilateral carotid artery stenosis  ECG 12 Lead   4. Subclavian arterial stenosis (CMS/HCC)     5. Aortic atherosclerosis (CMS/HCC)  ECG 12 Lead   6. SOB (shortness of breath)  ECG 12 Lead    Stress Test With Myocardial Perfusion One Day   7. Abnormal ECG  ECG 12 Lead    Stress Test With Myocardial Perfusion One Day          Plan:       1.  Patient has exertional dyspnea, worsening fatigability, some chest and back discomfort with both typical and atypical components, familial history of CAD, heavy tobacco abuse, diffuse atherosclerotic disease in her carotids, subclavian, aortic, and an abnormal EKG.  We will arrange for stress Cardiolite to be obtained.  Determination of anticipated risk for the surgical procedure will then be assessed.  2.  Tobacco abuse-smoking cessation is recommended  3.  Peripheral vascular disease-risk  factor modification and medical therapy    Thank you very much for allowing us to participate in the care of this pleasant patient.  Please don't hesitate to call if I can be of assistance in any way.      Current Outpatient Prescriptions:   •  alendronate (FOSAMAX) 70 MG tablet, Take 1 tablet by mouth Every 7 (Seven) Days. For Osteopenia; take with full water; stay upright 30 minutes, Disp: 5 tablet, Rfl: 11  •  clopidogrel (PLAVIX) 75 MG tablet, Take 75 mg by mouth Daily., Disp: , Rfl:   •  hydrOXYzine (VISTARIL) 25 MG capsule, 1-2 tabs PO Q 8 hours prn anxiety and can take at HS for insomnia, Disp: 60 capsule, Rfl: 1  •  pitavastatin calcium (LIVALO) 2 MG tablet tablet, Take 1 tablet by mouth Every Night. For cholesterol and stop if myalgias with this, Disp: 30 tablet, Rfl: 11         EMR Dragon/Transcription disclaimer:    Much of this encounter note is an electronic transcription/translation of spoken language to printed text. The electronic translation of spoken language may permit erroneous, or at times, nonsensical words or phrases to be inadvertently transcribed; Although I have reviewed the note for such errors, some may still exist.

## 2018-10-26 ENCOUNTER — HOSPITAL ENCOUNTER (OUTPATIENT)
Dept: CARDIOLOGY | Facility: HOSPITAL | Age: 63
Discharge: HOME | End: 2018-10-26
Attending: INTERNAL MEDICINE | Admitting: INTERNAL MEDICINE

## 2018-10-26 VITALS — BODY MASS INDEX: 22.96 KG/M2 | WEIGHT: 155 LBS | HEIGHT: 69 IN

## 2018-10-26 DIAGNOSIS — I73.9 PAD (PERIPHERAL ARTERY DISEASE) (HCC): ICD-10-CM

## 2018-10-26 DIAGNOSIS — R94.31 ABNORMAL ECG: ICD-10-CM

## 2018-10-26 DIAGNOSIS — Z01.810 PREOP CARDIOVASCULAR EXAM: ICD-10-CM

## 2018-10-26 DIAGNOSIS — R06.02 SOB (SHORTNESS OF BREATH): ICD-10-CM

## 2018-10-26 LAB
BH CV NUCLEAR PRIOR STUDY: 2
BH CV STRESS BP STAGE 1: NORMAL
BH CV STRESS DURATION MIN STAGE 1: 3
BH CV STRESS DURATION MIN STAGE 2: 1
BH CV STRESS DURATION SEC STAGE 1: 0
BH CV STRESS DURATION SEC STAGE 2: 30
BH CV STRESS GRADE STAGE 1: 10
BH CV STRESS GRADE STAGE 2: 12
BH CV STRESS HR STAGE 1: 143
BH CV STRESS HR STAGE 2: 160
BH CV STRESS METS STAGE 1: 5
BH CV STRESS METS STAGE 2: 7.5
BH CV STRESS PROTOCOL 1: NORMAL
BH CV STRESS RECOVERY BP: NORMAL MMHG
BH CV STRESS RECOVERY HR: 94 BPM
BH CV STRESS SPEED STAGE 1: 1.7
BH CV STRESS SPEED STAGE 2: 2.5
BH CV STRESS STAGE 1: 1
BH CV STRESS STAGE 2: 2
LV EF NUC BP: 64 %
MAXIMAL PREDICTED HEART RATE: 157 BPM
PERCENT MAX PREDICTED HR: 101.91 %
STRESS BASELINE BP: NORMAL MMHG
STRESS BASELINE HR: 82 BPM
STRESS PERCENT HR: 120 %
STRESS POST ESTIMATED WORKLOAD: 6 METS
STRESS POST EXERCISE DUR MIN: 4 MIN
STRESS POST EXERCISE DUR SEC: 30 SEC
STRESS POST PEAK BP: NORMAL MMHG
STRESS POST PEAK HR: 160 BPM
STRESS TARGET HR: 133 BPM

## 2018-10-26 PROCEDURE — 78452 HT MUSCLE IMAGE SPECT MULT: CPT | Performed by: INTERNAL MEDICINE

## 2018-10-26 PROCEDURE — 93016 CV STRESS TEST SUPVJ ONLY: CPT | Performed by: INTERNAL MEDICINE

## 2018-10-26 PROCEDURE — 0 TECHNETIUM TETROFOSMIN KIT: Performed by: INTERNAL MEDICINE

## 2018-10-26 PROCEDURE — A9502 TC99M TETROFOSMIN: HCPCS | Performed by: INTERNAL MEDICINE

## 2018-10-26 PROCEDURE — 93017 CV STRESS TEST TRACING ONLY: CPT

## 2018-10-26 PROCEDURE — 93018 CV STRESS TEST I&R ONLY: CPT | Performed by: INTERNAL MEDICINE

## 2018-10-26 PROCEDURE — 78452 HT MUSCLE IMAGE SPECT MULT: CPT

## 2018-10-26 RX ADMIN — TETROFOSMIN 1 DOSE: 1.38 INJECTION, POWDER, LYOPHILIZED, FOR SOLUTION INTRAVENOUS at 07:25

## 2018-10-26 RX ADMIN — TETROFOSMIN 1 DOSE: 1.38 INJECTION, POWDER, LYOPHILIZED, FOR SOLUTION INTRAVENOUS at 08:20

## 2018-11-07 VITALS
RESPIRATION RATE: 18 BRPM | RESPIRATION RATE: 28 BRPM | OXYGEN SATURATION: 99 % | HEART RATE: 70 BPM | SYSTOLIC BLOOD PRESSURE: 103 MMHG | RESPIRATION RATE: 26 BRPM | HEART RATE: 77 BPM | TEMPERATURE: 97.2 F | SYSTOLIC BLOOD PRESSURE: 82 MMHG | HEART RATE: 67 BPM | SYSTOLIC BLOOD PRESSURE: 81 MMHG | HEART RATE: 85 BPM | HEIGHT: 68 IN | SYSTOLIC BLOOD PRESSURE: 104 MMHG | OXYGEN SATURATION: 97 % | SYSTOLIC BLOOD PRESSURE: 107 MMHG | OXYGEN SATURATION: 95 % | RESPIRATION RATE: 24 BRPM | OXYGEN SATURATION: 98 % | SYSTOLIC BLOOD PRESSURE: 85 MMHG | DIASTOLIC BLOOD PRESSURE: 60 MMHG | DIASTOLIC BLOOD PRESSURE: 43 MMHG | OXYGEN SATURATION: 100 % | DIASTOLIC BLOOD PRESSURE: 77 MMHG | RESPIRATION RATE: 20 BRPM | WEIGHT: 145 LBS | RESPIRATION RATE: 14 BRPM | HEART RATE: 76 BPM | DIASTOLIC BLOOD PRESSURE: 65 MMHG | DIASTOLIC BLOOD PRESSURE: 64 MMHG | DIASTOLIC BLOOD PRESSURE: 61 MMHG | HEART RATE: 80 BPM | SYSTOLIC BLOOD PRESSURE: 69 MMHG | DIASTOLIC BLOOD PRESSURE: 54 MMHG

## 2018-11-08 ENCOUNTER — AMBULATORY SURGICAL CENTER (AMBULATORY)
Dept: URBAN - METROPOLITAN AREA SURGERY 17 | Facility: SURGERY | Age: 63
End: 2018-11-08
Payer: COMMERCIAL

## 2018-11-08 ENCOUNTER — OFFICE (AMBULATORY)
Dept: URBAN - METROPOLITAN AREA PATHOLOGY 4 | Facility: PATHOLOGY | Age: 63
End: 2018-11-08
Payer: COMMERCIAL

## 2018-11-08 DIAGNOSIS — K62.1 RECTAL POLYP: ICD-10-CM

## 2018-11-08 DIAGNOSIS — Z12.11 ENCOUNTER FOR SCREENING FOR MALIGNANT NEOPLASM OF COLON: ICD-10-CM

## 2018-11-08 DIAGNOSIS — R14.0 ABDOMINAL DISTENSION (GASEOUS): ICD-10-CM

## 2018-11-08 LAB
GI HISTOLOGY: A. UNSPECIFIED: (no result)
GI HISTOLOGY: PDF REPORT: (no result)

## 2018-11-08 PROCEDURE — 45385 COLONOSCOPY W/LESION REMOVAL: CPT | Performed by: INTERNAL MEDICINE

## 2018-11-08 PROCEDURE — 88305 TISSUE EXAM BY PATHOLOGIST: CPT | Mod: 33 | Performed by: INTERNAL MEDICINE

## 2018-12-14 ENCOUNTER — APPOINTMENT (OUTPATIENT)
Dept: PREADMISSION TESTING | Facility: HOSPITAL | Age: 63
End: 2018-12-14

## 2018-12-14 ENCOUNTER — HOSPITAL ENCOUNTER (OUTPATIENT)
Dept: GENERAL RADIOLOGY | Facility: HOSPITAL | Age: 63
Discharge: HOME | End: 2018-12-14
Admitting: SURGERY

## 2018-12-14 VITALS
HEART RATE: 82 BPM | HEIGHT: 68 IN | OXYGEN SATURATION: 98 % | DIASTOLIC BLOOD PRESSURE: 89 MMHG | SYSTOLIC BLOOD PRESSURE: 165 MMHG | RESPIRATION RATE: 18 BRPM | BODY MASS INDEX: 24.01 KG/M2 | TEMPERATURE: 97.6 F | WEIGHT: 158.4 LBS

## 2018-12-14 LAB
ALBUMIN SERPL-MCNC: 4.2 G/DL (ref 3.5–5.2)
ALBUMIN/GLOB SERPL: 1.4 G/DL
ALP SERPL-CCNC: 79 U/L (ref 39–117)
ALT SERPL W P-5'-P-CCNC: 16 U/L (ref 1–33)
ANION GAP SERPL CALCULATED.3IONS-SCNC: 12.1 MMOL/L
APTT PPP: 25.3 SECONDS (ref 22.7–35.4)
AST SERPL-CCNC: 16 U/L (ref 1–32)
BACTERIA UR QL AUTO: ABNORMAL /HPF
BILIRUB SERPL-MCNC: 0.7 MG/DL (ref 0.1–1.2)
BILIRUB UR QL STRIP: NEGATIVE
BUN BLD-MCNC: 13 MG/DL (ref 8–23)
BUN/CREAT SERPL: 13.8 (ref 7–25)
CALCIUM SPEC-SCNC: 9 MG/DL (ref 8.6–10.5)
CHLORIDE SERPL-SCNC: 103 MMOL/L (ref 98–107)
CLARITY UR: CLEAR
CO2 SERPL-SCNC: 25.9 MMOL/L (ref 22–29)
COLOR UR: ABNORMAL
CREAT BLD-MCNC: 0.94 MG/DL (ref 0.57–1)
DEPRECATED RDW RBC AUTO: 49.8 FL (ref 37–54)
ERYTHROCYTE [DISTWIDTH] IN BLOOD BY AUTOMATED COUNT: 13.8 % (ref 11.7–13)
GFR SERPL CREATININE-BSD FRML MDRD: 60 ML/MIN/1.73
GLOBULIN UR ELPH-MCNC: 2.9 GM/DL
GLUCOSE BLD-MCNC: 99 MG/DL (ref 65–99)
GLUCOSE UR STRIP-MCNC: NEGATIVE MG/DL
HCT VFR BLD AUTO: 49 % (ref 35.6–45.5)
HGB BLD-MCNC: 15.6 G/DL (ref 11.9–15.5)
HGB UR QL STRIP.AUTO: ABNORMAL
HYALINE CASTS UR QL AUTO: ABNORMAL /LPF
INR PPP: 0.95 (ref 0.9–1.1)
KETONES UR QL STRIP: ABNORMAL
LEUKOCYTE ESTERASE UR QL STRIP.AUTO: ABNORMAL
MCH RBC QN AUTO: 31.4 PG (ref 26.9–32)
MCHC RBC AUTO-ENTMCNC: 31.8 G/DL (ref 32.4–36.3)
MCV RBC AUTO: 98.6 FL (ref 80.5–98.2)
NITRITE UR QL STRIP: NEGATIVE
PH UR STRIP.AUTO: 6.5 [PH] (ref 5–8)
PLATELET # BLD AUTO: 277 10*3/MM3 (ref 140–500)
PMV BLD AUTO: 9.5 FL (ref 6–12)
POTASSIUM BLD-SCNC: 4.2 MMOL/L (ref 3.5–5.2)
PROT SERPL-MCNC: 7.1 G/DL (ref 6–8.5)
PROT UR QL STRIP: ABNORMAL
PROTHROMBIN TIME: 12.4 SECONDS (ref 11.7–14.2)
RBC # BLD AUTO: 4.97 10*6/MM3 (ref 3.9–5.2)
RBC # UR: ABNORMAL /HPF
REF LAB TEST METHOD: ABNORMAL
SODIUM BLD-SCNC: 141 MMOL/L (ref 136–145)
SP GR UR STRIP: 1.02 (ref 1–1.03)
SQUAMOUS #/AREA URNS HPF: ABNORMAL /HPF
UROBILINOGEN UR QL STRIP: ABNORMAL
WBC NRBC COR # BLD: 9.24 10*3/MM3 (ref 4.5–10.7)
WBC UR QL AUTO: ABNORMAL /HPF

## 2018-12-14 PROCEDURE — 71046 X-RAY EXAM CHEST 2 VIEWS: CPT

## 2018-12-14 PROCEDURE — 81001 URINALYSIS AUTO W/SCOPE: CPT | Performed by: SURGERY

## 2018-12-14 PROCEDURE — 85610 PROTHROMBIN TIME: CPT | Performed by: SURGERY

## 2018-12-14 PROCEDURE — 80053 COMPREHEN METABOLIC PANEL: CPT | Performed by: SURGERY

## 2018-12-14 PROCEDURE — 85730 THROMBOPLASTIN TIME PARTIAL: CPT | Performed by: SURGERY

## 2018-12-14 PROCEDURE — 36415 COLL VENOUS BLD VENIPUNCTURE: CPT

## 2018-12-14 PROCEDURE — 85027 COMPLETE CBC AUTOMATED: CPT | Performed by: SURGERY

## 2018-12-14 NOTE — DISCHARGE INSTRUCTIONS
Take the following medications the morning of surgery with a small sip of water:  NONE    Arrive to hospital on your day of surgery at 5:30 AM.      General Instructions:  • Do not eat solid food after midnight the night before surgery.  • You may drink clear liquids day of surgery but must stop at least one hour before your hospital arrival time.  • It is beneficial for you to have a clear drink that contains carbohydrates the day of surgery.  We suggest a 12 to 20 ounce bottle of Gatorade or Powerade for non-diabetic patients or a 12 to 20 ounce bottle of G2 or Powerade Zero for diabetic patients. (Pediatric patients, are not advised to drink a 12 to 20 ounce carbohydrate drink)    Clear liquids are liquids you can see through.  Nothing red in color.     Plain water                               Sports drinks  Sodas                                   Gelatin (Jell-O)  Fruit juices without pulp such as white grape juice and apple juice  Popsicles that contain no fruit or yogurt  Tea or coffee (no cream or milk added)  Gatorade / Powerade  G2 / Powerade Zero    • Infants may have breast milk up to four hours before surgery.  • Infants drinking formula may drink formula up to six hours before surgery.   • Patients who avoid smoking, chewing tobacco and alcohol for 4 weeks prior to surgery have a reduced risk of post-operative complications.  Quit smoking as many days before surgery as you can.  • Do not smoke, use chewing tobacco or drink alcohol the day of surgery.   • If applicable bring your C-PAP/ BI-PAP machine.  • Bring any papers given to you in the doctor’s office.  • Wear clean comfortable clothes and socks.  • Do not wear contact lenses or make-up.  Bring a case for your glasses.   • Bring crutches or walker if applicable.  • Remove all piercings.  Leave jewelry and any other valuables at home.  • Hair extensions with metal clips must be removed prior to surgery.  • The Pre-Admission Testing nurse will  instruct you to bring medications if unable to obtain an accurate list in Pre-Admission Testing.        If you were given a blood bank ID arm band remember to bring it with you the day of surgery.    Preventing a Surgical Site Infection:  • For 2 to 3 days before surgery, avoid shaving with a razor because the razor can irritate skin and make it easier to develop an infection.    • Any areas of open skin can increase the risk of a post-operative wound infection by allowing bacteria to enter and travel throughout the body.  Notify your surgeon if you have any skin wounds / rashes even if it is not near the expected surgical site.  The area will need assessed to determine if surgery should be delayed until it is healed.  • The night prior to surgery sleep in a clean bed with clean clothing.  Do not allow pets to sleep with you.  • Shower on the morning of surgery using a fresh bar of anti-bacterial soap (such as Dial) and clean washcloth.  Dry with a clean towel and dress in clean clothing.  • Ask your surgeon if you will be receiving antibiotics prior to surgery.  • Make sure you, your family, and all healthcare providers clean their hands with soap and water or an alcohol based hand  before caring for you or your wound.    Day of surgery:  Upon arrival, a Pre-op nurse and Anesthesiologist will review your health history, obtain vital signs, and answer questions you may have.  The only belongings needed at this time will be your home medications and if applicable your C-PAP/BI-PAP machine.  If you are staying overnight your family can leave the rest of your belongings in the car and bring them to your room later.  A Pre-op nurse will start an IV and you may receive medication in preparation for surgery, including something to help you relax.  Your family will be able to see you in the Pre-op area.  While you are in surgery your family should notify the waiting room  if they leave the waiting room  area and provide a contact phone number.    Please be aware that surgery does come with discomfort.  We want to make every effort to control your discomfort so please discuss any uncontrolled symptoms with your nurse.   Your doctor will most likely have prescribed pain medications.      If you are going home after surgery you will receive individualized written care instructions before being discharged.  A responsible adult must drive you to and from the hospital on the day of your surgery and stay with you for 24 hours.    If you are staying overnight following surgery, you will be transported to your hospital room following the recovery period.  [facility] has all private rooms.    You have received a list of surgical assistants for your reference.  If you have any questions please call Pre-Admission Testing at [phone].  Deductibles and co-payments are collected on the day of service. Please be prepared to pay the required co-pay, deductible or deposit on the day of service as defined by your plan.

## 2018-12-21 ENCOUNTER — HOSPITAL ENCOUNTER (INPATIENT)
Facility: HOSPITAL | Age: 63
LOS: 1 days | Discharge: HOME | End: 2018-12-22
Attending: SURGERY | Admitting: SURGERY

## 2018-12-21 ENCOUNTER — APPOINTMENT (OUTPATIENT)
Dept: GENERAL RADIOLOGY | Facility: HOSPITAL | Age: 63
End: 2018-12-21

## 2018-12-21 ENCOUNTER — ANESTHESIA (OUTPATIENT)
Dept: PERIOP | Facility: HOSPITAL | Age: 63
End: 2018-12-21

## 2018-12-21 ENCOUNTER — ANESTHESIA EVENT (OUTPATIENT)
Dept: PERIOP | Facility: HOSPITAL | Age: 63
End: 2018-12-21

## 2018-12-21 PROBLEM — I65.22 LEFT CAROTID ARTERY STENOSIS: Status: ACTIVE | Noted: 2018-12-21

## 2018-12-21 PROCEDURE — 25010000002 FENTANYL CITRATE (PF) 100 MCG/2ML SOLUTION: Performed by: NURSE ANESTHETIST, CERTIFIED REGISTERED

## 2018-12-21 PROCEDURE — 94640 AIRWAY INHALATION TREATMENT: CPT

## 2018-12-21 PROCEDURE — 25010000002 FENTANYL CITRATE (PF) 100 MCG/2ML SOLUTION: Performed by: ANESTHESIOLOGY

## 2018-12-21 PROCEDURE — 25010000002 ONDANSETRON PER 1 MG: Performed by: NURSE ANESTHETIST, CERTIFIED REGISTERED

## 2018-12-21 PROCEDURE — C1769 GUIDE WIRE: HCPCS | Performed by: SURGERY

## 2018-12-21 PROCEDURE — 25010000002 PROMETHAZINE PER 50 MG: Performed by: SURGERY

## 2018-12-21 PROCEDURE — 25010000002 PROTAMINE SULFATE PER 10 MG: Performed by: NURSE ANESTHETIST, CERTIFIED REGISTERED

## 2018-12-21 PROCEDURE — 25010000002 PROPOFOL 10 MG/ML EMULSION: Performed by: NURSE ANESTHETIST, CERTIFIED REGISTERED

## 2018-12-21 PROCEDURE — 25010000002 HEPARIN (PORCINE) PER 1000 UNITS: Performed by: NURSE ANESTHETIST, CERTIFIED REGISTERED

## 2018-12-21 PROCEDURE — 25010000002 HEPARIN (PORCINE) PER 1000 UNITS: Performed by: SURGERY

## 2018-12-21 PROCEDURE — C1876 STENT, NON-COA/NON-COV W/DEL: HCPCS | Performed by: SURGERY

## 2018-12-21 PROCEDURE — 0 IOPAMIDOL PER 1 ML: Performed by: SURGERY

## 2018-12-21 PROCEDURE — 85347 COAGULATION TIME ACTIVATED: CPT

## 2018-12-21 PROCEDURE — 25010000002 SUCCINYLCHOLINE PER 20 MG: Performed by: NURSE ANESTHETIST, CERTIFIED REGISTERED

## 2018-12-21 PROCEDURE — 25010000002 PHENYLEPHRINE PER 1 ML: Performed by: NURSE ANESTHETIST, CERTIFIED REGISTERED

## 2018-12-21 PROCEDURE — 03HY32Z INSERTION OF MONITORING DEVICE INTO UPPER ARTERY, PERCUTANEOUS APPROACH: ICD-10-PCS | Performed by: ANESTHESIOLOGY

## 2018-12-21 PROCEDURE — 25010000003 CEFAZOLIN PER 500 MG: Performed by: SURGERY

## 2018-12-21 PROCEDURE — C1894 INTRO/SHEATH, NON-LASER: HCPCS | Performed by: SURGERY

## 2018-12-21 PROCEDURE — 037L3DZ DILATION OF LEFT INTERNAL CAROTID ARTERY WITH INTRALUMINAL DEVICE, PERCUTANEOUS APPROACH: ICD-10-PCS | Performed by: SURGERY

## 2018-12-21 PROCEDURE — 94799 UNLISTED PULMONARY SVC/PX: CPT

## 2018-12-21 PROCEDURE — C1725 CATH, TRANSLUMIN NON-LASER: HCPCS | Performed by: SURGERY

## 2018-12-21 PROCEDURE — 25010000002 MIDAZOLAM PER 1 MG: Performed by: ANESTHESIOLOGY

## 2018-12-21 PROCEDURE — 25010000003 CEFAZOLIN IN DEXTROSE 2-4 GM/100ML-% SOLUTION: Performed by: SURGERY

## 2018-12-21 PROCEDURE — 25010000002 HYDROMORPHONE PER 4 MG: Performed by: NURSE ANESTHETIST, CERTIFIED REGISTERED

## 2018-12-21 PROCEDURE — 25010000002 DEXAMETHASONE PER 1 MG: Performed by: NURSE ANESTHETIST, CERTIFIED REGISTERED

## 2018-12-21 DEVICE — 9 MM X 30 MM
Type: IMPLANTABLE DEVICE | Site: CAROTID | Status: FUNCTIONAL
Brand: ENROUTE TRANSCAROTID STENT

## 2018-12-21 RX ORDER — SUCCINYLCHOLINE CHLORIDE 20 MG/ML
INJECTION INTRAMUSCULAR; INTRAVENOUS AS NEEDED
Status: DISCONTINUED | OUTPATIENT
Start: 2018-12-21 | End: 2018-12-21 | Stop reason: SURG

## 2018-12-21 RX ORDER — DEXAMETHASONE SODIUM PHOSPHATE 10 MG/ML
INJECTION INTRAMUSCULAR; INTRAVENOUS AS NEEDED
Status: DISCONTINUED | OUTPATIENT
Start: 2018-12-21 | End: 2018-12-21 | Stop reason: SURG

## 2018-12-21 RX ORDER — EPHEDRINE SULFATE 50 MG/ML
5 INJECTION, SOLUTION INTRAVENOUS ONCE AS NEEDED
Status: DISCONTINUED | OUTPATIENT
Start: 2018-12-21 | End: 2018-12-21 | Stop reason: HOSPADM

## 2018-12-21 RX ORDER — ROCURONIUM BROMIDE 10 MG/ML
INJECTION, SOLUTION INTRAVENOUS AS NEEDED
Status: DISCONTINUED | OUTPATIENT
Start: 2018-12-21 | End: 2018-12-21 | Stop reason: SURG

## 2018-12-21 RX ORDER — ONDANSETRON 4 MG/1
4 TABLET, ORALLY DISINTEGRATING ORAL EVERY 6 HOURS PRN
Status: DISCONTINUED | OUTPATIENT
Start: 2018-12-21 | End: 2018-12-22 | Stop reason: HOSPADM

## 2018-12-21 RX ORDER — PROMETHAZINE HYDROCHLORIDE 25 MG/1
25 TABLET ORAL ONCE AS NEEDED
Status: DISCONTINUED | OUTPATIENT
Start: 2018-12-21 | End: 2018-12-21 | Stop reason: HOSPADM

## 2018-12-21 RX ORDER — NALOXONE HCL 0.4 MG/ML
0.4 VIAL (ML) INJECTION
Status: DISCONTINUED | OUTPATIENT
Start: 2018-12-21 | End: 2018-12-22 | Stop reason: HOSPADM

## 2018-12-21 RX ORDER — PROMETHAZINE HYDROCHLORIDE 25 MG/1
25 SUPPOSITORY RECTAL ONCE AS NEEDED
Status: DISCONTINUED | OUTPATIENT
Start: 2018-12-21 | End: 2018-12-21 | Stop reason: HOSPADM

## 2018-12-21 RX ORDER — FENTANYL CITRATE 50 UG/ML
50 INJECTION, SOLUTION INTRAMUSCULAR; INTRAVENOUS
Status: DISCONTINUED | OUTPATIENT
Start: 2018-12-21 | End: 2018-12-21 | Stop reason: HOSPADM

## 2018-12-21 RX ORDER — LABETALOL HYDROCHLORIDE 5 MG/ML
5 INJECTION, SOLUTION INTRAVENOUS
Status: DISCONTINUED | OUTPATIENT
Start: 2018-12-21 | End: 2018-12-21 | Stop reason: HOSPADM

## 2018-12-21 RX ORDER — HYDROMORPHONE HYDROCHLORIDE 1 MG/ML
0.5 INJECTION, SOLUTION INTRAMUSCULAR; INTRAVENOUS; SUBCUTANEOUS
Status: DISCONTINUED | OUTPATIENT
Start: 2018-12-21 | End: 2018-12-22 | Stop reason: HOSPADM

## 2018-12-21 RX ORDER — ONDANSETRON 2 MG/ML
4 INJECTION INTRAMUSCULAR; INTRAVENOUS EVERY 6 HOURS PRN
Status: DISCONTINUED | OUTPATIENT
Start: 2018-12-21 | End: 2018-12-22 | Stop reason: HOSPADM

## 2018-12-21 RX ORDER — GLYCOPYRROLATE 0.2 MG/ML
INJECTION INTRAMUSCULAR; INTRAVENOUS AS NEEDED
Status: DISCONTINUED | OUTPATIENT
Start: 2018-12-21 | End: 2018-12-21 | Stop reason: SURG

## 2018-12-21 RX ORDER — ONDANSETRON 4 MG/1
4 TABLET, FILM COATED ORAL EVERY 6 HOURS PRN
Status: DISCONTINUED | OUTPATIENT
Start: 2018-12-21 | End: 2018-12-22 | Stop reason: HOSPADM

## 2018-12-21 RX ORDER — PROMETHAZINE HYDROCHLORIDE 25 MG/ML
25 INJECTION, SOLUTION INTRAMUSCULAR; INTRAVENOUS EVERY 6 HOURS PRN
Status: DISCONTINUED | OUTPATIENT
Start: 2018-12-21 | End: 2018-12-22 | Stop reason: HOSPADM

## 2018-12-21 RX ORDER — PROMETHAZINE HYDROCHLORIDE 25 MG/ML
12.5 INJECTION, SOLUTION INTRAMUSCULAR; INTRAVENOUS ONCE AS NEEDED
Status: DISCONTINUED | OUTPATIENT
Start: 2018-12-21 | End: 2018-12-21 | Stop reason: HOSPADM

## 2018-12-21 RX ORDER — OXYCODONE AND ACETAMINOPHEN 7.5; 325 MG/1; MG/1
1 TABLET ORAL ONCE AS NEEDED
Status: DISCONTINUED | OUTPATIENT
Start: 2018-12-21 | End: 2018-12-21 | Stop reason: HOSPADM

## 2018-12-21 RX ORDER — LIDOCAINE HYDROCHLORIDE 10 MG/ML
0.5 INJECTION, SOLUTION EPIDURAL; INFILTRATION; INTRACAUDAL; PERINEURAL ONCE AS NEEDED
Status: DISCONTINUED | OUTPATIENT
Start: 2018-12-21 | End: 2018-12-21 | Stop reason: HOSPADM

## 2018-12-21 RX ORDER — ONDANSETRON 2 MG/ML
INJECTION INTRAMUSCULAR; INTRAVENOUS AS NEEDED
Status: DISCONTINUED | OUTPATIENT
Start: 2018-12-21 | End: 2018-12-21 | Stop reason: SURG

## 2018-12-21 RX ORDER — PROTAMINE SULFATE 10 MG/ML
INJECTION, SOLUTION INTRAVENOUS AS NEEDED
Status: DISCONTINUED | OUTPATIENT
Start: 2018-12-21 | End: 2018-12-21 | Stop reason: SURG

## 2018-12-21 RX ORDER — ESMOLOL HYDROCHLORIDE 10 MG/ML
INJECTION INTRAVENOUS AS NEEDED
Status: DISCONTINUED | OUTPATIENT
Start: 2018-12-21 | End: 2018-12-21 | Stop reason: SURG

## 2018-12-21 RX ORDER — FAMOTIDINE 10 MG/ML
20 INJECTION, SOLUTION INTRAVENOUS ONCE
Status: COMPLETED | OUTPATIENT
Start: 2018-12-21 | End: 2018-12-21

## 2018-12-21 RX ORDER — PROPOFOL 10 MG/ML
VIAL (ML) INTRAVENOUS AS NEEDED
Status: DISCONTINUED | OUTPATIENT
Start: 2018-12-21 | End: 2018-12-21 | Stop reason: SURG

## 2018-12-21 RX ORDER — HYDRALAZINE HYDROCHLORIDE 20 MG/ML
5 INJECTION INTRAMUSCULAR; INTRAVENOUS
Status: DISCONTINUED | OUTPATIENT
Start: 2018-12-21 | End: 2018-12-21 | Stop reason: HOSPADM

## 2018-12-21 RX ORDER — ASPIRIN 81 MG/1
81 TABLET ORAL DAILY
COMMUNITY

## 2018-12-21 RX ORDER — SODIUM CHLORIDE, SODIUM LACTATE, POTASSIUM CHLORIDE, CALCIUM CHLORIDE 600; 310; 30; 20 MG/100ML; MG/100ML; MG/100ML; MG/100ML
9 INJECTION, SOLUTION INTRAVENOUS CONTINUOUS
Status: DISCONTINUED | OUTPATIENT
Start: 2018-12-21 | End: 2018-12-22 | Stop reason: HOSPADM

## 2018-12-21 RX ORDER — ASPIRIN 81 MG/1
81 TABLET ORAL DAILY
Status: DISCONTINUED | OUTPATIENT
Start: 2018-12-21 | End: 2018-12-22 | Stop reason: HOSPADM

## 2018-12-21 RX ORDER — NALOXONE HCL 0.4 MG/ML
0.2 VIAL (ML) INJECTION AS NEEDED
Status: DISCONTINUED | OUTPATIENT
Start: 2018-12-21 | End: 2018-12-21 | Stop reason: HOSPADM

## 2018-12-21 RX ORDER — HYDROMORPHONE HYDROCHLORIDE 1 MG/ML
0.5 INJECTION, SOLUTION INTRAMUSCULAR; INTRAVENOUS; SUBCUTANEOUS
Status: DISCONTINUED | OUTPATIENT
Start: 2018-12-21 | End: 2018-12-21 | Stop reason: HOSPADM

## 2018-12-21 RX ORDER — HEPARIN SODIUM 1000 [USP'U]/ML
INJECTION, SOLUTION INTRAVENOUS; SUBCUTANEOUS AS NEEDED
Status: DISCONTINUED | OUTPATIENT
Start: 2018-12-21 | End: 2018-12-21 | Stop reason: SURG

## 2018-12-21 RX ORDER — IPRATROPIUM BROMIDE AND ALBUTEROL SULFATE 2.5; .5 MG/3ML; MG/3ML
3 SOLUTION RESPIRATORY (INHALATION)
Status: DISCONTINUED | OUTPATIENT
Start: 2018-12-21 | End: 2018-12-22 | Stop reason: HOSPADM

## 2018-12-21 RX ORDER — FENTANYL CITRATE 50 UG/ML
INJECTION, SOLUTION INTRAMUSCULAR; INTRAVENOUS AS NEEDED
Status: DISCONTINUED | OUTPATIENT
Start: 2018-12-21 | End: 2018-12-21 | Stop reason: SURG

## 2018-12-21 RX ORDER — LIDOCAINE HYDROCHLORIDE 20 MG/ML
INJECTION, SOLUTION INFILTRATION; PERINEURAL AS NEEDED
Status: DISCONTINUED | OUTPATIENT
Start: 2018-12-21 | End: 2018-12-21 | Stop reason: SURG

## 2018-12-21 RX ORDER — CEFAZOLIN SODIUM 2 G/100ML
2 INJECTION, SOLUTION INTRAVENOUS ONCE
Status: COMPLETED | OUTPATIENT
Start: 2018-12-21 | End: 2018-12-21

## 2018-12-21 RX ORDER — NITROGLYCERIN 0.4 MG/1
0.4 TABLET SUBLINGUAL
Status: DISCONTINUED | OUTPATIENT
Start: 2018-12-21 | End: 2018-12-22 | Stop reason: HOSPADM

## 2018-12-21 RX ORDER — FLUMAZENIL 0.1 MG/ML
0.2 INJECTION INTRAVENOUS AS NEEDED
Status: DISCONTINUED | OUTPATIENT
Start: 2018-12-21 | End: 2018-12-21 | Stop reason: HOSPADM

## 2018-12-21 RX ORDER — ATORVASTATIN CALCIUM 20 MG/1
20 TABLET, FILM COATED ORAL DAILY
Status: DISCONTINUED | OUTPATIENT
Start: 2018-12-21 | End: 2018-12-22 | Stop reason: HOSPADM

## 2018-12-21 RX ORDER — ALBUTEROL SULFATE 2.5 MG/3ML
2.5 SOLUTION RESPIRATORY (INHALATION) ONCE AS NEEDED
Status: DISCONTINUED | OUTPATIENT
Start: 2018-12-21 | End: 2018-12-21 | Stop reason: HOSPADM

## 2018-12-21 RX ORDER — MIDAZOLAM HYDROCHLORIDE 1 MG/ML
1 INJECTION INTRAMUSCULAR; INTRAVENOUS
Status: DISCONTINUED | OUTPATIENT
Start: 2018-12-21 | End: 2018-12-21 | Stop reason: HOSPADM

## 2018-12-21 RX ORDER — CLOPIDOGREL BISULFATE 75 MG/1
75 TABLET ORAL DAILY
Status: DISCONTINUED | OUTPATIENT
Start: 2018-12-21 | End: 2018-12-22 | Stop reason: HOSPADM

## 2018-12-21 RX ORDER — SODIUM CHLORIDE 0.9 % (FLUSH) 0.9 %
1-10 SYRINGE (ML) INJECTION AS NEEDED
Status: DISCONTINUED | OUTPATIENT
Start: 2018-12-21 | End: 2018-12-21 | Stop reason: HOSPADM

## 2018-12-21 RX ORDER — HYDROCODONE BITARTRATE AND ACETAMINOPHEN 5; 325 MG/1; MG/1
1 TABLET ORAL EVERY 4 HOURS PRN
Status: DISCONTINUED | OUTPATIENT
Start: 2018-12-21 | End: 2018-12-22 | Stop reason: HOSPADM

## 2018-12-21 RX ORDER — ACETAMINOPHEN 325 MG/1
650 TABLET ORAL ONCE AS NEEDED
Status: DISCONTINUED | OUTPATIENT
Start: 2018-12-21 | End: 2018-12-21 | Stop reason: HOSPADM

## 2018-12-21 RX ORDER — HYDROCODONE BITARTRATE AND ACETAMINOPHEN 7.5; 325 MG/1; MG/1
1 TABLET ORAL ONCE AS NEEDED
Status: DISCONTINUED | OUTPATIENT
Start: 2018-12-21 | End: 2018-12-21 | Stop reason: HOSPADM

## 2018-12-21 RX ORDER — ONDANSETRON 2 MG/ML
4 INJECTION INTRAMUSCULAR; INTRAVENOUS ONCE AS NEEDED
Status: COMPLETED | OUTPATIENT
Start: 2018-12-21 | End: 2018-12-21

## 2018-12-21 RX ORDER — DIPHENHYDRAMINE HCL 25 MG
25 CAPSULE ORAL
Status: DISCONTINUED | OUTPATIENT
Start: 2018-12-21 | End: 2018-12-21 | Stop reason: HOSPADM

## 2018-12-21 RX ORDER — SODIUM CHLORIDE 9 MG/ML
125 INJECTION, SOLUTION INTRAVENOUS CONTINUOUS
Status: DISCONTINUED | OUTPATIENT
Start: 2018-12-21 | End: 2018-12-22 | Stop reason: HOSPADM

## 2018-12-21 RX ORDER — MIDAZOLAM HYDROCHLORIDE 1 MG/ML
2 INJECTION INTRAMUSCULAR; INTRAVENOUS
Status: DISCONTINUED | OUTPATIENT
Start: 2018-12-21 | End: 2018-12-21 | Stop reason: HOSPADM

## 2018-12-21 RX ADMIN — GLYCOPYRROLATE 0.2 MG: 0.2 INJECTION INTRAMUSCULAR; INTRAVENOUS at 09:01

## 2018-12-21 RX ADMIN — SODIUM CHLORIDE, POTASSIUM CHLORIDE, SODIUM LACTATE AND CALCIUM CHLORIDE 9 ML/HR: 600; 310; 30; 20 INJECTION, SOLUTION INTRAVENOUS at 07:06

## 2018-12-21 RX ADMIN — LIDOCAINE HYDROCHLORIDE 100 MG: 20 INJECTION, SOLUTION INFILTRATION; PERINEURAL at 08:02

## 2018-12-21 RX ADMIN — PROPOFOL 150 MG: 10 INJECTION, EMULSION INTRAVENOUS at 08:02

## 2018-12-21 RX ADMIN — SODIUM CHLORIDE 125 ML/HR: 9 INJECTION, SOLUTION INTRAVENOUS at 14:02

## 2018-12-21 RX ADMIN — FENTANYL CITRATE 50 MCG: 50 INJECTION, SOLUTION INTRAMUSCULAR; INTRAVENOUS at 07:05

## 2018-12-21 RX ADMIN — SODIUM CHLORIDE 125 ML/HR: 9 INJECTION, SOLUTION INTRAVENOUS at 21:31

## 2018-12-21 RX ADMIN — HYDROMORPHONE HYDROCHLORIDE 0.5 MG: 1 INJECTION, SOLUTION INTRAMUSCULAR; INTRAVENOUS; SUBCUTANEOUS at 11:01

## 2018-12-21 RX ADMIN — ROCURONIUM BROMIDE 5 MG: 10 INJECTION INTRAVENOUS at 08:02

## 2018-12-21 RX ADMIN — ATORVASTATIN CALCIUM 20 MG: 20 TABLET, FILM COATED ORAL at 20:10

## 2018-12-21 RX ADMIN — SUCCINYLCHOLINE CHLORIDE 100 MG: 20 INJECTION, SOLUTION INTRAMUSCULAR; INTRAVENOUS; PARENTERAL at 08:02

## 2018-12-21 RX ADMIN — HEPARIN SODIUM 10000 UNITS: 1000 INJECTION, SOLUTION INTRAVENOUS; SUBCUTANEOUS at 08:43

## 2018-12-21 RX ADMIN — FENTANYL CITRATE 50 MCG: 50 INJECTION INTRAMUSCULAR; INTRAVENOUS at 08:10

## 2018-12-21 RX ADMIN — SUGAMMADEX 200 MG: 100 INJECTION, SOLUTION INTRAVENOUS at 09:39

## 2018-12-21 RX ADMIN — FENTANYL CITRATE 50 MCG: 50 INJECTION INTRAMUSCULAR; INTRAVENOUS at 08:30

## 2018-12-21 RX ADMIN — CEFAZOLIN SODIUM 2 G: 2 INJECTION, SOLUTION INTRAVENOUS at 07:55

## 2018-12-21 RX ADMIN — MIDAZOLAM HYDROCHLORIDE 2 MG: 2 INJECTION, SOLUTION INTRAMUSCULAR; INTRAVENOUS at 07:05

## 2018-12-21 RX ADMIN — DEXAMETHASONE SODIUM PHOSPHATE 8 MG: 10 INJECTION INTRAMUSCULAR; INTRAVENOUS at 09:13

## 2018-12-21 RX ADMIN — FENTANYL CITRATE 50 MCG: 50 INJECTION, SOLUTION INTRAMUSCULAR; INTRAVENOUS at 10:40

## 2018-12-21 RX ADMIN — FENTANYL CITRATE 50 MCG: 50 INJECTION, SOLUTION INTRAMUSCULAR; INTRAVENOUS at 10:13

## 2018-12-21 RX ADMIN — PROMETHAZINE HYDROCHLORIDE 25 MG: 25 INJECTION INTRAMUSCULAR; INTRAVENOUS at 14:25

## 2018-12-21 RX ADMIN — FENTANYL CITRATE 50 MCG: 50 INJECTION INTRAMUSCULAR; INTRAVENOUS at 08:00

## 2018-12-21 RX ADMIN — PROTAMINE SULFATE 40 MG: 10 INJECTION, SOLUTION INTRAVENOUS at 09:20

## 2018-12-21 RX ADMIN — IPRATROPIUM BROMIDE AND ALBUTEROL SULFATE 3 ML: 2.5; .5 SOLUTION RESPIRATORY (INHALATION) at 17:43

## 2018-12-21 RX ADMIN — GLYCOPYRROLATE 0.2 MG: 0.2 INJECTION INTRAMUSCULAR; INTRAVENOUS at 08:00

## 2018-12-21 RX ADMIN — FAMOTIDINE 20 MG: 10 INJECTION, SOLUTION INTRAVENOUS at 07:06

## 2018-12-21 RX ADMIN — PHENYLEPHRINE HYDROCHLORIDE 150 MCG: 10 INJECTION INTRAVENOUS at 08:45

## 2018-12-21 RX ADMIN — ESMOLOL HYDROCHLORIDE 10 MG: 10 INJECTION, SOLUTION INTRAVENOUS at 09:53

## 2018-12-21 RX ADMIN — FENTANYL CITRATE 50 MCG: 50 INJECTION INTRAMUSCULAR; INTRAVENOUS at 09:15

## 2018-12-21 RX ADMIN — ROCURONIUM BROMIDE 45 MG: 10 INJECTION INTRAVENOUS at 08:15

## 2018-12-21 RX ADMIN — IOPAMIDOL 14 ML: 510 INJECTION, SOLUTION INTRAVASCULAR at 08:44

## 2018-12-21 RX ADMIN — ONDANSETRON 4 MG: 2 INJECTION INTRAMUSCULAR; INTRAVENOUS at 09:26

## 2018-12-21 RX ADMIN — ONDANSETRON 4 MG: 2 INJECTION INTRAMUSCULAR; INTRAVENOUS at 10:12

## 2018-12-21 NOTE — ANESTHESIA PROCEDURE NOTES
Arterial Line      Patient location during procedure: holding area  Start time: 12/21/2018 7:00 AM  Stop Time:12/21/2018 7:16 AM       Line placed for hemodynamic monitoring.  Performed By   Anesthesiologist: Amandeep Presley MD  Preanesthetic Checklist  Completed: patient identified, site marked, surgical consent, pre-op evaluation, timeout performed, IV checked, risks and benefits discussed and monitors and equipment checked  Arterial Line Prep   Sterile Tech: mask and cap  Prep: ChloraPrep  Patient monitoring: blood pressure monitoring, continuous pulse oximetry and EKG  Arterial Line Procedure   Laterality:right  Location:  radial artery  Catheter size: 20 G   Guidance: landmark technique  Number of attempts: 2  Successful placement: yes          Post Assessment   Dressing Type: occlusive dressing applied, secured with tape and wrist guard applied.   Complications no  Circ/Move/Sens Assessment: unchanged.   Patient Tolerance: patient tolerated the procedure well with no apparent complications

## 2018-12-21 NOTE — ANESTHESIA PROCEDURE NOTES
ANESTHESIA INTUBATION  Urgency: elective    Airway not difficult    General Information and Staff    Patient location during procedure: OR  Anesthesiologist: Amandeep Presley MD  CRNA: Rafi Christopher CRNA    Indications and Patient Condition  Indications for airway management: airway protection    Preoxygenated: yes  MILS maintained throughout  Mask difficulty assessment: 1 - vent by mask    Final Airway Details  Final airway type: endotracheal airway      Successful airway: ETT  Cuffed: yes   Successful intubation technique: direct laryngoscopy  Endotracheal tube insertion site: oral  Blade: Sanna  Blade size: 3  ETT size (mm): 7.0  Cormack-Lehane Classification: grade I - full view of glottis  Placement verified by: chest auscultation and capnometry   Inital cuff pressure (cm H2O): 22  Cuff volume (mL): 7  Measured from: lips  ETT to lips (cm): 21  Number of attempts at approach: 1

## 2018-12-21 NOTE — H&P
Patient Care Team:  Gretchen King PA-C as PCP - General (Family Medicine)  Arias Leong MD as Consulting Physician (Vascular Surgery)  Julito Bueno III, MD as Consulting Physician (Cardiology)    Chief complaint left carotid stenosis    Subjective     History of Present Illness  Severe left SHERI with high lesion    Review of Systems no change    Past Medical History:   Diagnosis Date   • Acid reflux    • Anxiety    • Aortic atherosclerosis (CMS/HCC) 10/23/2018   • Bradycardia    • Carotid artery disease (CMS/HCC)    • Carotid artery stenosis    • Chest pain     NORMAL STRESS TEST 10/23/18   • Claudication (CMS/HCC)    • History of colon polyps    • Hyperlipidemia    • Kidney stone on left side    • Osteopenia    • Osteoporosis    • Palpitations    • PVD (peripheral vascular disease) (CMS/HCC)    • Subclavian artery stenosis (CMS/HCC)    • Tobacco abuse      Past Surgical History:   Procedure Laterality Date   • KNEE CARTILAGE SURGERY Right      Family History   Problem Relation Age of Onset   • Hypertension Father    • Lung disease Father    • Heart attack Father 62   • Anesthesia problems Father    • Heart failure Father 88   • Aneurysm Father         Abdominal Aortic Aneurysm   • Cancer Father    • COPD Father    • Atrial fibrillation Father    • Cancer Sister    • Heart disease Mother 83   • Hypertension Mother    • Atrial fibrillation Mother    • Malig Hyperthermia Neg Hx      Social History     Tobacco Use   • Smoking status: Current Every Day Smoker     Packs/day: 1.00     Years: 45.00     Pack years: 45.00     Types: Cigarettes   • Smokeless tobacco: Never Used   • Tobacco comment: caff use   Substance Use Topics   • Alcohol use: Yes     Comment: SOCIALLY   • Drug use: No     Medications Prior to Admission   Medication Sig Dispense Refill Last Dose   • aspirin 81 MG EC tablet Take 81 mg by mouth Daily.   12/21/2018 at 0400   • clopidogrel (PLAVIX) 75 MG tablet Take 75 mg by mouth Daily.    12/21/2018 at 0400   • hydrOXYzine (VISTARIL) 25 MG capsule 1-2 tabs PO Q 8 hours prn anxiety and can take at HS for insomnia (Patient taking differently: Take 25-50 mg by mouth 3 (Three) Times a Day As Needed. 1-2 tabs PO Q 8 hours prn anxiety and can take at HS for insomnia) 60 capsule 1 Past Month at Unknown time   • Pitavastatin Calcium 4 MG tablet One PO daily For cholesterol (new dose) (put on file) (Patient taking differently: Take 4 mg by mouth Daily. One PO daily For cholesterol (new dose) (put on file)) 30 tablet 11 12/20/2018 at 1900   • alendronate (FOSAMAX) 70 MG tablet Take 1 tablet by mouth Every 7 (Seven) Days. For Osteopenia; take with full water; stay upright 30 minutes 5 tablet 11 12/17/2018     Allergies:  Patient has no known allergies.    Objective      Vital Signs  Temp:  [97.9 °F (36.6 °C)] 97.9 °F (36.6 °C)  Heart Rate:  [63-87] 67  Resp:  [24] 24  BP: (154-182)/(64-87) 154/64    Physical Exam    Results Review:   I reviewed the patient's new clinical results.  I reviewed the patient's new imaging results and agree with the interpretation.      Assessment/Plan       Left carotid artery stenosis      Assessment & Plan    I discussed the patients findings and my recommendations with patient    Arias Leong MD  12/21/18  7:50 AM

## 2018-12-21 NOTE — BRIEF OP NOTE
TRANSCAROTID ARTERY REVASCULARIZATION  Progress Note    Nelsy Stokes  12/21/2018    Pre-op Diagnosis:   Left carotid stenosis       Post-Op Diagnosis Codes:  same    Procedure/CPT® Codes:      Procedure(s):  TRANSCAROTID ARTERY REVASCULARIZATION    Surgeon(s):  Arias Leong MD    Anesthesia: General    Staff:   Circulator: Martha King RN; Matheus Eubanks RN  Scrub Person: Belinda Fuentes  Vascular Radiology Technician: Smith Swift    Estimated Blood Loss: none    Urine Voided: * No values recorded between 12/21/2018  7:49 AM and 12/21/2018  9:51 AM *    Specimens:                None      Drains:      Findings: see dict    Complications: none      Arias Leong MD     Date: 12/21/2018  Time: 9:57 AM

## 2018-12-21 NOTE — ANESTHESIA PREPROCEDURE EVALUATION
Anesthesia Evaluation     Patient summary reviewed and Nursing notes reviewed   NPO Solid Status: > 8 hours  NPO Liquid Status: > 8 hours           Airway   Mallampati: II  Neck ROM: full  No difficulty expected  Dental - normal exam     Pulmonary     breath sounds clear to auscultation  (+) a smoker Current,   Cardiovascular     Rhythm: regular    (+) PVD, hyperlipidemia,  carotid artery disease      Neuro/Psych  (+) psychiatric history,     GI/Hepatic/Renal/Endo    (+)  GERD,      Musculoskeletal     Abdominal    Substance History      OB/GYN          Other                        Anesthesia Plan    ASA 3     general   (A line)  intravenous induction   Anesthetic plan, all risks, benefits, and alternatives have been provided, discussed and informed consent has been obtained with: patient.

## 2018-12-21 NOTE — OP NOTE
Operative Note  Date of Admission:  12/21/2018  OR Date: 12/21/2018    Pre-op Diagnosis:left internal carotid artery stenosis, 80-99%      Post-op Diagnosis: Same    Procedure:    Trans-carotid cervical stent with distal embolic protection device (CPT 35362)  Ultrasound guided femoral vein access    Surgeon(s):  Arias Leong MD    Assistant: Belinda MCWILLIAMS , provided critical assistance in exposure, retraction, and suctioning that overall decrease blood loss and operative time.    Anesthesia: General    Estimated Blood Loss: 75 mL    Staff:   Circulator: Martha King RN; Matheus Eubanks RN  Scrub Person: Belinda Fuentes  Vascular Radiology Technician: Smith Swift    Complications: None    Specimen: None    Findings: see dict    Implants:   Implant Name Type Inv. Item Serial No.  Lot No. LRB No. Used   STENT ENROUTE TRANSCAROTID [id number] - [id number] Stent STENT ENROUTE TRANSCAROTID [id number]  [facility] [id number] Left 1       Indications:  Patient with a duplex examination identified internal carotid artery stenoses of 80 - 99% with no symptoms. The patient was seen in the Holding Room. The risks, benefits, complications, treatment options, and expected outcomes were reviewed with the patient. The risks and potential complications of their problem and purposed treatment include but are not limited to infection, bleeding, stroke, pain, nerve and vessel injury and complication secondary to the anesthetic. The patient concurred with the proposed plan, giving informed consent. The site of surgery properly noted/marked.       Procedure:  The patient was taken to Operating Room and identified as Nelsy Stokes and the procedure verified as left carotid stent. A Time Out was held and the above information confirmed.    The patient was taken to the operating and placed supine on the operating table.  After induction of IV sedation and anesthesia the neck was prepped and draped with  ChloraPrep.  A timeout was observed.  The carotid artery was evaluated under ultrasound in a sterile fashion.  The distance from the clavicle to the carotid bifurcation was verified to be a greater than or equal to 5 cm.  We then flushed and closed the venous and arterial sheath sets.  The Enroute flow controller was set to low volume for the system prep.  The contralateral femoral vein was accessed with the Enroute venous return sheath under direct ultrasound guidance.  The sheath was secured.  The skin just above the clavicle anterior to the sternocleidomastoid muscle was incised in the skin and subcutaneous tissues were divided.  The platysma was divided.  The common carotid artery was exposed circumferentially.  The vagus nerve was identified and protected.  Umbilical tape as placed proximal to the planned access site of the common carotid artery.    A U stitch was placed at the planned arterial entry site with a 5-0 Prolene suture.  The common carotid artery was accessed with a micropuncture needle and wire.  A carotid angiogram was taken in 2 views.  Once a micropuncture sheath was in place, a stiff 035 wire was used to facilitate the arterial sheath placement. The sheath was secured.  The patient was systemically heparinized.  We then connected the Enroute flow controller line to the arterial sheath.  It was back bled to remove air and then connected to the venous sheath.  We then used a Clamp, Profunda Debakey clamp to occlude the common carotid artery just proximal to the arterial sheath entry point.  Reversal of flow as distal embolic protection was confirmed by injecting saline into the venous shot line and visualizing the washout.  We confirmed this on both high and low flow settings.    Carotid angiogram was done by injecting contrast by hand while depressing the flow stop button on the flow controller.  This confirmed the lesion location.  Degree of stenosis was 99%.  We planned our of intervention by  determining the size of predilatation balloons and the stent size.  A 4 mm predilatation balloon was used.  Following this a 9 mm x 30 mm carotid stent was deployed and post dilated to 5 mm at 10 maurizio.  Patient tolerated this portion extremely well.  No drop in her pressure or heart rate..  The angioplasty and carotid stents were delivered under high reverse flow settings.  After stent deployment, the stent was angioplastied with a 5 mm I 20 mm balloon.  There was less than 20% significant residual stenosis.    The common carotid artery was unclamped to restore antegrade flow.  The arterial and venous sheaths were removed and the puncture sites were closed.  There was good hemostasis.  The wound was irrigated with antibiotic irrigation.  The site was closed in 3 layers with Vicryl sutures.  Dermabond glue was used for the skin.    The patient awoke neurologically intact.  The patient went to the recovery room in stable condition.      Radiographic findings:  High-grade internal carotid stenosis at the level of C2 noted.  Stenosis progress 99% nature.  Minimal vessel tortuosity noted.  Access to the common carotid was good with no dissection noted.  Intracranial views are limited.  Angioplasty followed by stent placement followed by post angioplasty performed under fluoroscopy.  Follow-up angiograms show less than 20% residual stenosis in stent with no complicating features seen.  Excellent result from stent placement    Active Hospital Problems    Diagnosis Date Noted   • Left carotid artery stenosis [I65.22] 12/21/2018      Resolved Hospital Problems   No resolved problems to display.      Arias Leong MD     Date: 12/21/2018  Time: 4:24 PM

## 2018-12-21 NOTE — ANESTHESIA POSTPROCEDURE EVALUATION
Patient: Nelsy Stokes    Procedure Summary     Date:  12/21/18 Room / Location:  [facility] OR 18 INV / [facility] HYBRID OR 18/19    Anesthesia Start:  0752 Anesthesia Stop:  1007    Procedure:  TRANSCAROTID ARTERY REVASCULARIZATION (Left Neck) Diagnosis:      Surgeon:  Arias Leong MD Provider:  Amandeep Presley MD    Anesthesia Type:  general ASA Status:  3          Anesthesia Type: general  Last vitals  BP   158/80 (12/21/18 1030)   Temp   36.6 °C (97.9 °F) (12/21/18 1006)   Pulse   68 (12/21/18 1030)   Resp   16 (12/21/18 1030)     SpO2   100 % (12/21/18 1030)     Post Anesthesia Care and Evaluation    Patient location during evaluation: PHASE II  Patient participation: complete - patient participated  Level of consciousness: awake  Pain management: adequate  Airway patency: patent  Anesthetic complications: No anesthetic complications    Cardiovascular status: acceptable  Respiratory status: acceptable  Hydration status: acceptable    Comments: /80   Pulse 68   Temp 36.6 °C (97.9 °F) (Oral)   Resp 16   LMP  (LMP Unknown)   SpO2 100%

## 2018-12-22 VITALS
SYSTOLIC BLOOD PRESSURE: 151 MMHG | RESPIRATION RATE: 16 BRPM | WEIGHT: 158.4 LBS | TEMPERATURE: 97.5 F | HEART RATE: 72 BPM | BODY MASS INDEX: 24.01 KG/M2 | DIASTOLIC BLOOD PRESSURE: 74 MMHG | OXYGEN SATURATION: 97 % | HEIGHT: 68 IN

## 2018-12-22 PROCEDURE — 94799 UNLISTED PULMONARY SVC/PX: CPT

## 2018-12-22 RX ORDER — HYDROCODONE BITARTRATE AND ACETAMINOPHEN 5; 325 MG/1; MG/1
2 TABLET ORAL EVERY 6 HOURS PRN
Qty: 24 TABLET | Refills: 0 | Status: SHIPPED | OUTPATIENT
Start: 2018-12-22 | End: 2019-11-19

## 2018-12-22 RX ADMIN — IPRATROPIUM BROMIDE AND ALBUTEROL SULFATE 3 ML: 2.5; .5 SOLUTION RESPIRATORY (INHALATION) at 06:55

## 2018-12-22 RX ADMIN — CLOPIDOGREL 75 MG: 75 TABLET, FILM COATED ORAL at 08:10

## 2018-12-22 RX ADMIN — ATORVASTATIN CALCIUM 20 MG: 20 TABLET, FILM COATED ORAL at 08:11

## 2018-12-22 RX ADMIN — ASPIRIN 81 MG: 81 TABLET, DELAYED RELEASE ORAL at 08:10

## 2018-12-22 NOTE — PROGRESS NOTES
Name: Nelsy Stokes ADMIT: 2018   : 1955  PCP: Gretchen King PA-C    MRN: [id number] LOS: 1 days   AGE/SEX: 63 y.o. female  ROOM: [facility]     CC: Postoperative day #1 status post left trans-carotid artery stent  Interval History: No new complaints.  Anxious to go home    Subjective   Review of Systems      Objective     Vital Signs  Temp:  [97.5 °F (36.4 °C)-98 °F (36.7 °C)] 97.5 °F (36.4 °C)  Heart Rate:  [61-83] 72  Resp:  [12-18] 16  BP: (117-175)/(56-98) 151/74  Arterial Line BP: (117-166)/(45-71) 120/48    No intake/output data recorded.    Physical Exam  Vascular: Sore throat.  Tongue midline.  No focal neurologic deficits.  No hematoma at left incision    Data Reviewed:  CBC      BMP       Imaging Reviewed: None        Active Hospital Problems    Diagnosis Date Noted   • Left carotid artery stenosis [I65.22] 2018      Resolved Hospital Problems   No resolved problems to display.      Assessment/Plan   Billin, Post Op Global    Impression/Plan:  Postoperative day 1 status post TCAR.  We'll plan to discharge patient today.    ghislaine Koenig lock fluids    Tim Ramos MD  18  9:31 AM  Office Number [phone]

## 2018-12-22 NOTE — PLAN OF CARE
Problem: Patient Care Overview  Goal: Plan of Care Review  Outcome: Ongoing (interventions implemented as appropriate)   12/22/18 1117   Coping/Psychosocial   Plan of Care Reviewed With patient;spouse   Plan of Care Review   Progress improving   OTHER   Outcome Summary vss, neurologically intact, left neck incision marcos cdi with dermabond, left groin puncture site with small amount dried drainage gauze, denies pain home today

## 2018-12-22 NOTE — PLAN OF CARE
Problem: Patient Care Overview  Goal: Plan of Care Review  Outcome: Ongoing (interventions implemented as appropriate)   12/22/18 0106   Coping/Psychosocial   Plan of Care Reviewed With patient   Plan of Care Review   Progress improving   OTHER   Outcome Summary Monitor pain,labs,and vitals. A-line,IV fluids. Monitor incision sites. Will cont.to monitor.     Goal: Individualization and Mutuality  Outcome: Ongoing (interventions implemented as appropriate)    Goal: Discharge Needs Assessment  Outcome: Ongoing (interventions implemented as appropriate)    Goal: Interprofessional Rounds/Family Conf  Outcome: Ongoing (interventions implemented as appropriate)      Problem: Carotid Endarterectomy (Adult)  Goal: Signs and Symptoms of Listed Potential Problems Will be Absent, Minimized or Managed (Carotid Endarterectomy)  Outcome: Outcome(s) achieved Date Met: 12/22/18 12/22/18 0106   Goal/Outcome Evaluation   Problems Assessed (Carotid Endarterectomy) all   Problems Present (Carotid Endarterect) none       Problem: Pain, Acute (Adult)  Goal: Identify Related Risk Factors and Signs and Symptoms  Outcome: Outcome(s) achieved Date Met: 12/22/18    Goal: Acceptable Pain Control/Comfort Level  Outcome: Ongoing (interventions implemented as appropriate)   12/22/18 0106   Pain, Acute (Adult)   Acceptable Pain Control/Comfort Level making progress toward outcome

## 2018-12-22 NOTE — DISCHARGE SUMMARY
Name: Nelsy Stokes ADMIT: 2018   : 1955  PCP: Gretchen King PA-C    MRN: [id number] LOS: 1 days   AGE/SEX: 63 y.o. female  ROOM:      Date of Admission: 2018  Date of Discharge:  2018    PCP: Gretchen King PA-C      DISCHARGE DIAGNOSIS  Active Hospital Problems    Diagnosis Date Noted   • Left carotid artery stenosis [I65.22] 2018   • Bilateral carotid artery disease (CMS/HCC) [I77.9] 2018      Resolved Hospital Problems   No resolved problems to display.       SECONDARY DIAGNOSES  Past Medical History:   Diagnosis Date   • Acid reflux    • Anxiety    • Aortic atherosclerosis (CMS/HCC) 10/23/2018   • Bradycardia    • Carotid artery disease (CMS/HCC)    • Carotid artery stenosis    • Chest pain     NORMAL STRESS TEST 10/23/18   • Claudication (CMS/HCC)    • History of colon polyps    • Hyperlipidemia    • Kidney stone on left side    • Osteopenia    • Osteoporosis    • Palpitations    • PVD (peripheral vascular disease) (CMS/HCC)    • Subclavian artery stenosis (CMS/HCC)    • Tobacco abuse        CONSULTS   Consults     No orders found for last 30 day(s).          PROCEDURES PERFORMED    Date: 10/21/2018, left trans-carotid artery stent    HOSPITAL COURSE  Patient is a 63 y.o. female presented to [facility] admitted for high-grade asymptomatic left carotid artery stenosis.  Please see the admitting history and physical for further details.  Patient's indications for the procedure were a high anatomic lesion.  This was at the C2 level.  She underwent an uncomplicated left transcarotid artery stent.  She was transferred to the recovery room to the floor and did well.  Blood pressure was good overnight.  No focal neurologic episodes or problems.  On the morning after surgery we DC'd her A-line, Hep-Lock her fluids, and she was tolerating a diet and her pain was well controlled.  It was felt usually be discharged home      VITAL SIGNS  /74 (BP  "Location: Left arm, Patient Position: Lying)   Pulse 72   Temp 97.5 °F (36.4 °C) (Oral)   Resp 16   Ht 172.7 cm (67.99\")   Wt 71.9 kg (158 lb 6.4 oz)   LMP  (LMP Unknown)   SpO2 97%   BMI 24.09 kg/m²   Objective  CONDITION ON DISCHARGE  Stable.      DISCHARGE DISPOSITION   Home or Self Care      DISCHARGE MEDICATIONS     Discharge Medications      New Medications      Instructions Start Date   HYDROcodone-acetaminophen 5-325 MG per tablet  Commonly known as:  NORCO   2 tablets, Oral, Every 6 Hours PRN         Changes to Medications      Instructions Start Date   hydrOXYzine 25 MG capsule  Commonly known as:  VISTARIL  What changed:    · how much to take  · how to take this  · when to take this  · reasons to take this  · additional instructions   1-2 tabs PO Q 8 hours prn anxiety and can take at HS for insomnia      Pitavastatin Calcium 4 MG tablet  What changed:    · how much to take  · how to take this  · when to take this  · additional instructions   One PO daily For cholesterol (new dose) (put on file)         Continue These Medications      Instructions Start Date   alendronate 70 MG tablet  Commonly known as:  FOSAMAX   70 mg, Oral, Every 7 Days, For Osteopenia; take with full water; stay upright 30 minutes      aspirin 81 MG EC tablet   81 mg, Oral, Daily      clopidogrel 75 MG tablet  Commonly known as:  PLAVIX   75 mg, Oral, Daily              Future Appointments   Date Time Provider Department Center   2019  9:30 AM Gretchen King PA-C [id number] None     Follow-up Information     Gretchen King PA-C Follow up.    Specialty:  Family Medicine  Contact information:  [address]  [facility] [id number] [phone]             Arias Leong MD Follow up in 2 week(s).    Specialty:  Vascular Surgery  Contact information:  [address]  [facility] [id number] [phone]                        Billin, Post Op Global    Tim Ramos MD  Office Number " [phone]    12/22/18  10:32 AM

## 2018-12-22 NOTE — DISCHARGE INSTRUCTIONS
[facility]  Dimas Capps, Treva Leong Rachel, Scherrer, Thomas  [address]  [place] [id number] [phone]    Carotid artery stent Discharge Instructions:    Activity  · Do not lift anything heavier than 5 pounds (a gallon of milk); no bending, straining, or strenuous activity for the first 2 weeks.  · No driving for 7 days or while taking prescription pain medications. You may ride in a car. It is important that you have the ability to turn your head quickly without discomfort whileoperating a vehicle. This may not be possible for some people for 1-2 weeks after surgery.  · Walk as often as you wish. Walk short distances at first and increase slowly. Avoid exercising inextreme temperatures.  · You may have some slight dizziness, a mild headache or tiredness for a few days.  · You may go up and down stairs. Hold onto the rail for the first few days after surgery because you may experience dizziness.  · If you smoke, please quit. Smoking increases you chances of developing heart disease, carotid artery disease, lung cancer, and peripheral vascular disease. It can also delay wound healing.    Personal Hygiene/Shower  · ?You may shower the next day after your surgery.  · Use soap and water to gently clean the incision. Do not scrub the incision. Pat dry with a clean towel.  · Do not soak in a tub, whirlpool, or hot tub, or go swimming until the incision is completely healed. This is generally 3-4 weeks for most people.    Diet  · Resume the diet you were on before your surgery unless otherwise directed.  · For most people a low saturated fat and cholesterol diet which is high in fruits, vegetables and whole grains is a good healthy diet unless otherwise directed by your doctor.    Incision  · There will be an area of numbness along the incision in the neck and toward the chin. The earlobe may also be affected. This generally goes away and may last for 6-12 months.  · Your  neck incision is about is about 3-4 inches in length. The sutures under the skin will dissolve on their own. Take a close look at the incision in the mirror so that you will know what it looks like before leaving the hospital and will notice changes if they occur at home.  · For the first couple of days sleep on a couple of pillows to help with the swelling in the neck around the incision.  · You may apply an ice pack for the first 48 hours after surgery to the neck incision. It should be applied for 15 minutes, then off for 15 minutes. This may help with swelling and discomfort.  · Men may find it more difficult to shave with a blade and may switch to an electric razor. Do not shave over the incision; go around it until the incision is healed.  · Your incision will take several months to heal completely. It may feel raised and thickened for a while and will decrease over time. It takes 2-3 weeks for the swelling to go away. No ointments, lotions, creams or bandages should be applied to the incision.  · Most people do not have very much pain with this surgery. You can take over the counter Tylenol (Acetaminophen) for mild discomfort. Take it was directed on the packaging. You will be prescribed a pain medication for moderate discomfort, take it as directed. Do NOT take additional Tylenol with prescription pain medication. One of the side effects of pain medications is constipation and nausea. Most people will have nausea if it is taken on an empty stomach. Eat a small snack with pain medication to avoid this side effect. Drinking plenty of fluid and eating high fiber foods (fruits, vegetables and whole grains) can help prevent constipation. If needed you can take Docusate Sodium (Colace) 100 mg, a stool softener, once or twice a day. This can be purchased at any drug store. A laxative may be needed if the constipation continues. Generally, an over the counter laxative, such as Dulcolax tablets, is recommended (take  it as directed on the manufacturers packaging).  · Your updated medication list will be given to you before you leave the hospital. New prescriptions will be provided to you and education regarding new medications provided.    Call Your Surgeon for Any of These Symptoms @ [phone]    · Increasing pain or swelling in the neck causing difficulty breathing or swallowing.   · Sudden or severe headache. A headache that will not go away.  · Changes in your eyesight, problem speaking, or problem swallowing.  · Weakness on one side of your body.  · Increasing pain, not relieved by pain medication.  · Fever above 101 degrees.  · Redness, an increase in swelling or bruising around your incision. There may be some slight drainage the first couple of days from the incision, but this should stop and the incision should be dry. If there is continued drainage or pus the surgeon should be called. If you cannot reach your doctor, go to the nearest emergency room for immediate assessment.    Reducing Your Risks  · All patients with vascular disease should take important steps to prevent worsening of their condition or development of new disease. It is very important that if you smoke to quit. Good medical management of high cholesterol, high blood pressure, and diabetes, along with maintaining a normal body weight is encouraged to all of our patients. This is one of the reasons why routine follow-up with your primary care physician is very important to your continued health and well-being.    Follow-up appointment  · A follow-up appointment will be provided for you before you leave the hospital 1-4 weeks after your surgery. During this or the next visit you will undergo carotid artery duplex scanning. It is extremely important that you make this appointment because we need to evaluate the post-surgical carotid artery for normal blood flow.    Returning to Work  · This is generally discussed at the follow-up visit. If you have a  desk job, you may be able to return to work earlier than someone with a job requiring physical labor. If you want to return to work earlier than 4 weeks, this should be discussed with your surgeon prior to leaving the hospital.

## 2018-12-22 NOTE — PLAN OF CARE
Problem: Carotid Endarterectomy (Adult)  Goal: Signs and Symptoms of Listed Potential Problems Will be Absent, Minimized or Managed (Carotid Endarterectomy)  Outcome: Ongoing (interventions implemented as appropriate)   12/21/18 2137   Goal/Outcome Evaluation   Problems Assessed (Carotid Endarterectomy) all   Problems Present (Carotid Endarterect) none     Goal: Anesthesia/Sedation Recovery  Outcome: Ongoing (interventions implemented as appropriate)   12/21/18 2137   Goal/Outcome Evaluation   Anesthesia/Sedation Recovery criteria met for transfer

## 2018-12-23 ENCOUNTER — READMISSION MANAGEMENT (OUTPATIENT)
Dept: CALL CENTER | Facility: HOSPITAL | Age: 63
End: 2018-12-23

## 2018-12-24 LAB
ACT BLD: 120 SECONDS (ref 82–152)
ACT BLD: 125 SECONDS (ref 82–152)
ACT BLD: 351 SECONDS (ref 82–152)

## 2018-12-24 NOTE — OUTREACH NOTE
Prep Survey      Responses   Facility patient discharged from?  [facility]   Is patient eligible?  Yes   Discharge diagnosis  S/P CAROTID STENT INSERTION   Does the patient have one of the following disease processes/diagnoses(primary or secondary)?  Cardiothoracic surgery   Does the patient have Home health ordered?  No   Is there a DME ordered?  No   Prep survey completed?  Yes          Bailey James RN

## 2018-12-26 NOTE — PROGRESS NOTES
Case Management Discharge Note    Final Note: Orders reviewed.  Pt discharged home, no known needs. MP Duffy RN    Destination      No service has been selected for the patient.      Durable Medical Equipment      No service has been selected for the patient.      Dialysis/Infusion      No service has been selected for the patient.      Home Medical Care      No service has been selected for the patient.      Community Resources      No service has been selected for the patient.        Other: Other(private auto)    Final Discharge Disposition Code: 01 - home or self-care

## 2018-12-27 ENCOUNTER — READMISSION MANAGEMENT (OUTPATIENT)
Dept: CALL CENTER | Facility: HOSPITAL | Age: 63
End: 2018-12-27

## 2018-12-27 NOTE — OUTREACH NOTE
"CT Surgery Week 1 Survey      Responses   Facility patient discharged from?  [facility]   Does the patient have one of the following disease processes/diagnoses(primary or secondary)?  Cardiothoracic surgery   Is there a successful TCM telephone encounter documented?  No   Week 1 attempt successful?  Yes   Call start time  1509   Call end time  1525   Discharge diagnosis  S/P CAROTID STENT INSERTION   Meds reviewed with patient/caregiver?  Yes   Is the patient having any side effects they believe may be caused by any medication additions or changes?  No   Does the patient have all medications related to this admission filled (includes all antibiotics, pain medications, cardiac medications, etc.)  Yes   Is the patient taking all medications as directed (includes completed medication regime)?  Yes   Does the patient have a primary care provider?   Yes   Does the patient have an appointment scheduled with their C/T surgeon?  Yes   Has the patient kept scheduled appointments due by today?  N/A   Has home health visited the patient within 72 hours of discharge?  N/A   Psychosocial issues?  No   Did the patient receive a copy of their discharge instructions?  Yes   Nursing interventions  Reviewed instructions with patient   What is the patient's perception of their health status since discharge?  Improving   Nursing interventions  Nurse provided patient education   Is the patient/caregiver able to teach back normal signs of recovery?  Nausea and lack of appetite   Is the patient/caregiver able to teach back signs and symptoms of incisional infection?  Increased redness, swelling or pain at the incisonal site, Increased drainage or bleeding, Incisional warmth   Is the patient/caregiver able to teach back steps to recovery at home?  Rest and rebuild strength, gradually increase activity, Practice good oral hygiene, Eat a well-balance diet   Additional teach back comments  Pt says she is \"feeling better.\" Says diastolic " pressure has been up to 101. It was 81 today. Advised to Take daily and keep record. Will see Dr on 1/2. Says wrist is tender where cath was inserted. No reddness or swelling. Also says L side of chest Hurts/tender off and on. Discussed if pain stay, radiates down arm, numbness down arm. drooping or tingling of face to go to ER   Week 1 call completed?  Yes            Keyonna Kimball RN

## 2019-01-08 ENCOUNTER — TELEPHONE (OUTPATIENT)
Dept: FAMILY MEDICINE CLINIC | Facility: CLINIC | Age: 64
End: 2019-01-08

## 2019-01-08 DIAGNOSIS — I73.9 PAD (PERIPHERAL ARTERY DISEASE) (HCC): ICD-10-CM

## 2019-01-08 DIAGNOSIS — E78.2 MIXED HYPERLIPIDEMIA: ICD-10-CM

## 2019-01-08 DIAGNOSIS — I77.9 BILATERAL CAROTID ARTERY DISEASE (HCC): ICD-10-CM

## 2019-01-08 RX ORDER — EZETIMIBE 10 MG/1
10 TABLET ORAL DAILY
Qty: 30 TABLET | Refills: 11 | Status: SHIPPED | OUTPATIENT
Start: 2019-01-08 | End: 2019-01-09 | Stop reason: SDUPTHER

## 2019-01-08 NOTE — TELEPHONE ENCOUNTER
Pt states that pitavastatin is causing her aches in the legs and arms. Also her insurance will not cover this anymore.

## 2019-01-09 RX ORDER — EZETIMIBE 10 MG/1
10 TABLET ORAL DAILY
Qty: 90 TABLET | Refills: 1 | Status: SHIPPED | OUTPATIENT
Start: 2019-01-09 | End: 2019-11-19 | Stop reason: SDUPTHER

## 2019-01-16 LAB
ALBUMIN SERPL-MCNC: 4.3 G/DL (ref 3.5–5.2)
ALBUMIN/GLOB SERPL: 1.8 G/DL
ALP SERPL-CCNC: 72 U/L (ref 39–117)
ALT SERPL-CCNC: 24 U/L (ref 1–33)
AST SERPL-CCNC: 18 U/L (ref 1–32)
BILIRUB SERPL-MCNC: 0.5 MG/DL (ref 0.1–1.2)
BUN SERPL-MCNC: 11 MG/DL (ref 8–23)
BUN/CREAT SERPL: 13.6 (ref 7–25)
CALCIUM SERPL-MCNC: 9.7 MG/DL (ref 8.6–10.5)
CHLORIDE SERPL-SCNC: 100 MMOL/L (ref 98–107)
CHOLEST SERPL-MCNC: 190 MG/DL (ref 0–200)
CO2 SERPL-SCNC: 29.2 MMOL/L (ref 22–29)
CREAT SERPL-MCNC: 0.81 MG/DL (ref 0.57–1)
GLOBULIN SER CALC-MCNC: 2.4 GM/DL
GLUCOSE SERPL-MCNC: 92 MG/DL (ref 65–99)
HDLC SERPL-MCNC: 55 MG/DL (ref 40–60)
LDLC SERPL CALC-MCNC: 119 MG/DL (ref 0–100)
POTASSIUM SERPL-SCNC: 4.6 MMOL/L (ref 3.5–5.2)
PROT SERPL-MCNC: 6.7 G/DL (ref 6–8.5)
SODIUM SERPL-SCNC: 141 MMOL/L (ref 136–145)
TRIGL SERPL-MCNC: 80 MG/DL (ref 0–150)
VLDLC SERPL CALC-MCNC: 16 MG/DL (ref 5–40)

## 2019-01-18 ENCOUNTER — OFFICE VISIT (OUTPATIENT)
Dept: FAMILY MEDICINE CLINIC | Facility: CLINIC | Age: 64
End: 2019-01-18

## 2019-01-18 VITALS
OXYGEN SATURATION: 97 % | HEART RATE: 80 BPM | TEMPERATURE: 98.3 F | BODY MASS INDEX: 24.25 KG/M2 | SYSTOLIC BLOOD PRESSURE: 141 MMHG | DIASTOLIC BLOOD PRESSURE: 80 MMHG | HEIGHT: 68 IN | RESPIRATION RATE: 16 BRPM | WEIGHT: 160 LBS

## 2019-01-18 DIAGNOSIS — Z86.79 HISTORY OF CAROTID STENOSIS: ICD-10-CM

## 2019-01-18 DIAGNOSIS — I73.9 PAD (PERIPHERAL ARTERY DISEASE) (HCC): Chronic | ICD-10-CM

## 2019-01-18 DIAGNOSIS — F41.8 SITUATIONAL ANXIETY: ICD-10-CM

## 2019-01-18 DIAGNOSIS — I10 ESSENTIAL HYPERTENSION: Primary | ICD-10-CM

## 2019-01-18 DIAGNOSIS — E78.2 MIXED HYPERLIPIDEMIA: ICD-10-CM

## 2019-01-18 DIAGNOSIS — R53.83 TIRED: ICD-10-CM

## 2019-01-18 PROCEDURE — 99214 OFFICE O/P EST MOD 30 MIN: CPT | Performed by: PHYSICIAN ASSISTANT

## 2019-01-18 RX ORDER — LISINOPRIL 5 MG/1
5 TABLET ORAL DAILY
Qty: 90 TABLET | Refills: 0 | Status: SHIPPED | OUTPATIENT
Start: 2019-01-18 | End: 2019-02-13

## 2019-01-18 NOTE — PROGRESS NOTES
Subjective   Nelsy Stokes is a 63 y.o. female.     History of Present Illness   Nelsy Stokes 63 y.o. female who presents today for routine follow up check and medication refills.  she has a history of   Patient Active Problem List   Diagnosis   • Asymptomatic microscopic hematuria   • Subclavian arterial stenosis (CMS/HCC)   • PAD (peripheral artery disease) (CMS/HCC)   • Bilateral carotid artery disease (CMS/HCC)   • Mixed hyperlipidemia   • Osteopenia of multiple sites   • Aortic atherosclerosis (CMS/HCC)   • Left carotid artery stenosis   • Essential hypertension   • Situational anxiety   .  Since the last visit, she has overall felt tired.  She has has been having elevated blood pressure readings and here to evaluate this, Hyperlipidemia and here to discuss treatment and Vitamin D deficiency and well controlled on medication and labs at goal >30.  she has been compliant with current medications have reviewed them.  The patient denies medication side effects.  Home bp systolic 140-150s  She is intol to statins  LDL goal not met    PRN Vistaril helps anxiety   I have her on Fosamax    Results for orders placed or performed in visit on 01/08/19   Lipid panel   Result Value Ref Range    Total Cholesterol 190 0 - 200 mg/dL    Triglycerides 80 0 - 150 mg/dL    HDL Cholesterol 55 40 - 60 mg/dL    VLDL Cholesterol 16 5 - 40 mg/dL    LDL Cholesterol  119 (H) 0 - 100 mg/dL   Comprehensive metabolic panel   Result Value Ref Range    Glucose 92 65 - 99 mg/dL    BUN 11 8 - 23 mg/dL    Creatinine 0.81 0.57 - 1.00 mg/dL    eGFR Non African Am 71 >60 mL/min/1.73    eGFR African Am 87 >60 mL/min/1.73    BUN/Creatinine Ratio 13.6 7.0 - 25.0    Sodium 141 136 - 145 mmol/L    Potassium 4.6 3.5 - 5.2 mmol/L    Chloride 100 98 - 107 mmol/L    Total CO2 29.2 (H) 22.0 - 29.0 mmol/L    Calcium 9.7 8.6 - 10.5 mg/dL    Total Protein 6.7 6.0 - 8.5 g/dL    Albumin 4.30 3.50 - 5.20 g/dL    Globulin 2.4 gm/dL    A/G Ratio 1.8 g/dL     Total Bilirubin 0.5 0.1 - 1.2 mg/dL    Alkaline Phosphatase 72 39 - 117 U/L    AST (SGOT) 18 1 - 32 U/L    ALT (SGPT) 24 1 - 33 U/L   had colonoscopy Nov  On ASA and Plavix  I will start Lisinopril at 5mg and see how that goes. Watch ACE cough; see me 1 mo; need to stop smoking  occas GERD      Did fine with carotid procedure Dec    Weight is up and more tired  She stays tired X 3 weeks; no SOA, no chest pain  I will update thyroid labs and CBC  May need sleep study  Denies depression    No SOA or chest pain  Mild h/a's  Mom HTN  Declines low dose CT chest  The following portions of the patient's history were reviewed and updated as appropriate: allergies, current medications, past family history, past medical history, past social history, past surgical history and problem list.    Review of Systems   Constitutional: Positive for fatigue and unexpected weight change. Negative for activity change and appetite change.   HENT: Negative for nosebleeds and trouble swallowing.    Eyes: Negative for pain and visual disturbance.   Respiratory: Positive for cough and wheezing. Negative for chest tightness and shortness of breath.    Cardiovascular: Negative for chest pain and palpitations.   Gastrointestinal: Negative for abdominal pain and blood in stool.   Endocrine: Negative.    Genitourinary: Negative for difficulty urinating and hematuria.   Musculoskeletal: Negative for joint swelling.   Skin: Negative for color change and rash.   Allergic/Immunologic: Negative.    Neurological: Negative for syncope and speech difficulty.   Hematological: Negative for adenopathy.   Psychiatric/Behavioral: Negative for agitation and confusion.   All other systems reviewed and are negative.      Objective   Physical Exam   Constitutional: She is oriented to person, place, and time. She appears well-developed and well-nourished. No distress.   HENT:   Head: Normocephalic and atraumatic.   Right Ear: External ear normal.   Left Ear:  External ear normal.   Nose: Nose normal.   Mouth/Throat: Oropharynx is clear and moist. No oropharyngeal exudate.   Eyes: Conjunctivae and EOM are normal. Pupils are equal, round, and reactive to light. Right eye exhibits no discharge. Left eye exhibits no discharge. No scleral icterus.   Neck: Normal range of motion. Neck supple. No tracheal deviation present. No thyromegaly present.   Cardiovascular: Normal rate, regular rhythm, normal heart sounds, intact distal pulses and normal pulses. Exam reveals no gallop.   No murmur heard.  I did not hear carotid bruits   Pulmonary/Chest: Effort normal and breath sounds normal. No respiratory distress. She has no wheezes. She has no rales.   Abdominal: Soft.   Musculoskeletal: Normal range of motion.   Lymphadenopathy:     She has no cervical adenopathy.   Neurological: She is alert and oriented to person, place, and time. She exhibits normal muscle tone. Coordination normal.   Skin: Skin is warm. No rash noted. No erythema. No pallor.   Psychiatric: She has a normal mood and affect. Her behavior is normal. Judgment and thought content normal.   Nursing note and vitals reviewed.      Assessment/Plan   Problems Addressed this Visit        Cardiovascular and Mediastinum    PAD (peripheral artery disease) (CMS/HCC) (Chronic)    Relevant Orders    CBC & Differential    T4, Free    T3, Free    TSH    Vitamin B12    Folate    Mixed hyperlipidemia    Relevant Orders    CBC & Differential    T4, Free    T3, Free    TSH    Vitamin B12    Folate    Left carotid artery stenosis    Essential hypertension - Primary    Relevant Medications    lisinopril (PRINIVIL,ZESTRIL) 5 MG tablet    Other Relevant Orders    CBC & Differential    T4, Free    T3, Free    TSH    Vitamin B12    Folate       Other    Situational anxiety      Other Visit Diagnoses     Tired        Relevant Orders    CBC & Differential    T4, Free    T3, Free    TSH    Vitamin B12    Folate

## 2019-01-18 NOTE — PATIENT INSTRUCTIONS
Low glycemic index diet  Exercise 30 minutes most days of the week  Make sure you get results on any labs or tests we ordered today  We discussed medications and how to take them as prescribed  Sleep 6-8 hours each night if possible  If you have not signed up for [employer], please activate your code ASAP from your After Visit Summary today    LDL goal <100  LDL goal if heart disease <70  HDL goal >60  Triglyceride goal <150  BP goal =<130/80  Fasting glucose <100

## 2019-01-31 ENCOUNTER — OFFICE VISIT (OUTPATIENT)
Dept: FAMILY MEDICINE CLINIC | Facility: CLINIC | Age: 64
End: 2019-01-31

## 2019-01-31 VITALS
BODY MASS INDEX: 24.25 KG/M2 | RESPIRATION RATE: 16 BRPM | DIASTOLIC BLOOD PRESSURE: 72 MMHG | TEMPERATURE: 97.9 F | HEIGHT: 68 IN | OXYGEN SATURATION: 99 % | SYSTOLIC BLOOD PRESSURE: 120 MMHG | HEART RATE: 69 BPM | WEIGHT: 160 LBS

## 2019-01-31 DIAGNOSIS — L24.9 IRRITANT CONTACT DERMATITIS, UNSPECIFIED TRIGGER: Primary | ICD-10-CM

## 2019-01-31 PROCEDURE — 99213 OFFICE O/P EST LOW 20 MIN: CPT | Performed by: PHYSICIAN ASSISTANT

## 2019-01-31 RX ORDER — CLOBETASOL PROPIONATE 0.5 MG/G
CREAM TOPICAL
Qty: 45 G | Refills: 1 | Status: SHIPPED | OUTPATIENT
Start: 2019-01-31 | End: 2020-02-11

## 2019-01-31 NOTE — PROGRESS NOTES
Subjective   Nelsy Stokes is a 63 y.o. female.     History of Present Illness   Nelsy Stokes 63 y.o. female presents for evaluation of a rash involving the back. Rash has been present for: 6 days. Lesions are pink, and are described as papular. Rash has not changed over time. Rash is painful and is pruritic. Associated symptoms  .none.  Patient denies:fever, headache, spreading of the rash and peeling..  Treatment to date includes: OTC Hydrocortisone cream with improvement. Symptoms are unchanged. Patient has not had contacts with similar rash. Patient has not had new exposures (soaps, lotions, laundry detergents, foods, medications, plants, insects or animals).  No oral C/o  I did just see her and starting ACE and emailed me bp still up and did double dose to 10mg and watching  The following portions of the patient's history were reviewed and updated as appropriate: allergies, current medications, past family history, past medical history, past social history, past surgical history and problem list.    Review of Systems   Constitutional: Negative for activity change, appetite change and unexpected weight change.   HENT: Negative for nosebleeds and trouble swallowing.    Eyes: Negative for pain and visual disturbance.   Respiratory: Negative for chest tightness, shortness of breath and wheezing.    Cardiovascular: Negative for chest pain and palpitations.   Gastrointestinal: Negative for abdominal pain and blood in stool.   Endocrine: Negative.    Genitourinary: Negative for difficulty urinating and hematuria.   Musculoskeletal: Negative for joint swelling.   Skin: Positive for rash. Negative for color change.   Allergic/Immunologic: Negative.    Neurological: Negative for syncope and speech difficulty.   Hematological: Negative for adenopathy.   Psychiatric/Behavioral: Negative for agitation and confusion.   All other systems reviewed and are negative.      Objective   Physical Exam   Constitutional: She is  oriented to person, place, and time. She appears well-developed and well-nourished. No distress.   HENT:   Head: Normocephalic and atraumatic.   Eyes: Conjunctivae and EOM are normal. Pupils are equal, round, and reactive to light. Right eye exhibits no discharge. Left eye exhibits no discharge. No scleral icterus.   Neck: Normal range of motion. Neck supple.   Cardiovascular: Normal rate, regular rhythm, normal heart sounds, intact distal pulses and normal pulses. Exam reveals no gallop.   No murmur heard.  I did not hear carotid bruits   Pulmonary/Chest: Effort normal and breath sounds normal. No respiratory distress. She has no wheezes. She has no rales.   Abdominal: Soft.   Musculoskeletal: Normal range of motion.   Lymphadenopathy:     She has no cervical adenopathy.   Neurological: She is alert and oriented to person, place, and time. She exhibits normal muscle tone. Coordination normal.   Skin: Skin is warm. Rash noted. No erythema. No pallor.   Mild lower lumbar pink papular rash midline and no left side; few on right   Psychiatric: She has a normal mood and affect. Her behavior is normal. Judgment and thought content normal.   Nursing note and vitals reviewed.      Assessment/Plan   Problems Addressed this Visit     None      Visit Diagnoses     Irritant contact dermatitis, unspecified trigger    -  Primary

## 2019-01-31 NOTE — PATIENT INSTRUCTIONS
Can take OTC Zyrtec 10mg----do not take with generic Vistaril;  1-2 times a day as needed for the relief of itching.  You can also add OTC Benadryl 25mg (I usually suggest this at bedtime).  You can get OTC Domeboro to mix with water and soak in this or make cool compresses for itching as well.  Avoid heat, even in the shower.  This tends to make the itching worse.  Contact office if your rash becomes infected, like red streaks around it or pustular drainage, and/or fever.  .

## 2019-02-12 LAB
BASOPHILS # BLD AUTO: 0.1 X10E3/UL (ref 0–0.2)
BASOPHILS NFR BLD AUTO: 1 %
EOSINOPHIL # BLD AUTO: 0.2 X10E3/UL (ref 0–0.4)
EOSINOPHIL NFR BLD AUTO: 3 %
ERYTHROCYTE [DISTWIDTH] IN BLOOD BY AUTOMATED COUNT: 13.7 % (ref 12.3–15.4)
FOLATE SERPL-MCNC: 11.3 NG/ML
HCT VFR BLD AUTO: 47.4 % (ref 34–46.6)
HGB BLD-MCNC: 15.8 G/DL (ref 11.1–15.9)
IMM GRANULOCYTES # BLD AUTO: 0 X10E3/UL (ref 0–0.1)
IMM GRANULOCYTES NFR BLD AUTO: 0 %
LYMPHOCYTES # BLD AUTO: 2.2 X10E3/UL (ref 0.7–3.1)
LYMPHOCYTES NFR BLD AUTO: 36 %
MCH RBC QN AUTO: 31.2 PG (ref 26.6–33)
MCHC RBC AUTO-ENTMCNC: 33.3 G/DL (ref 31.5–35.7)
MCV RBC AUTO: 94 FL (ref 79–97)
MONOCYTES # BLD AUTO: 0.5 X10E3/UL (ref 0.1–0.9)
MONOCYTES NFR BLD AUTO: 8 %
NEUTROPHILS # BLD AUTO: 3.3 X10E3/UL (ref 1.4–7)
NEUTROPHILS NFR BLD AUTO: 52 %
PLATELET # BLD AUTO: 346 X10E3/UL (ref 150–379)
RBC # BLD AUTO: 5.07 X10E6/UL (ref 3.77–5.28)
T3FREE SERPL-MCNC: 2.9 PG/ML (ref 2–4.4)
T4 FREE SERPL-MCNC: 1.15 NG/DL (ref 0.82–1.77)
TSH SERPL DL<=0.005 MIU/L-ACNC: 1.84 UIU/ML (ref 0.45–4.5)
VIT B12 SERPL-MCNC: 510 PG/ML (ref 232–1245)
WBC # BLD AUTO: 6.3 X10E3/UL (ref 3.4–10.8)

## 2019-02-13 ENCOUNTER — OFFICE VISIT (OUTPATIENT)
Dept: FAMILY MEDICINE CLINIC | Facility: CLINIC | Age: 64
End: 2019-02-13

## 2019-02-13 VITALS
BODY MASS INDEX: 24.25 KG/M2 | HEART RATE: 83 BPM | WEIGHT: 160 LBS | OXYGEN SATURATION: 98 % | RESPIRATION RATE: 16 BRPM | HEIGHT: 68 IN | DIASTOLIC BLOOD PRESSURE: 68 MMHG | TEMPERATURE: 98.5 F | SYSTOLIC BLOOD PRESSURE: 110 MMHG

## 2019-02-13 DIAGNOSIS — Z87.442 HISTORY OF RENAL STONE: ICD-10-CM

## 2019-02-13 DIAGNOSIS — I10 ESSENTIAL HYPERTENSION: Primary | ICD-10-CM

## 2019-02-13 DIAGNOSIS — E78.2 MIXED HYPERLIPIDEMIA: ICD-10-CM

## 2019-02-13 DIAGNOSIS — I73.9 PAD (PERIPHERAL ARTERY DISEASE) (HCC): Chronic | ICD-10-CM

## 2019-02-13 PROCEDURE — 99213 OFFICE O/P EST LOW 20 MIN: CPT | Performed by: PHYSICIAN ASSISTANT

## 2019-02-13 RX ORDER — LISINOPRIL 10 MG/1
10 TABLET ORAL DAILY
Qty: 90 TABLET | Refills: 1 | Status: SHIPPED | OUTPATIENT
Start: 2019-02-13 | End: 2019-04-05

## 2019-02-13 NOTE — PROGRESS NOTES
Subjective   Nelsy Stokes is a 63 y.o. female.     History of Present Illness     Nelsy Stokes 63 y.o. female who presents today for routine follow up check and medication refills.  she has a history of   Patient Active Problem List   Diagnosis   • Asymptomatic microscopic hematuria   • Subclavian arterial stenosis (CMS/HCC)   • PAD (peripheral artery disease) (CMS/HCC)   • Bilateral carotid artery disease (CMS/HCC)   • Mixed hyperlipidemia   • Osteopenia of multiple sites   • Aortic atherosclerosis (CMS/HCC)   • Left carotid artery stenosis   • Essential hypertension   • Situational anxiety   .  Since the last visit, she has overall felt fairly well.  She has Hyperlipidemia and here to discuss treatment and new DX HTN and on Rx---here for f/u; intol statins; on Zetia.  she has been compliant with current medications have reviewed them.  The patient denies medication side effects.  She is still tired but better when has something to do; not depressed, bored at times; will consider sleep study  I just did labs and thyroid fine    Results for orders placed or performed in visit on 01/18/19   T4, Free   Result Value Ref Range    Free T4 1.15 0.82 - 1.77 ng/dL   T3, Free   Result Value Ref Range    T3, Free 2.9 2.0 - 4.4 pg/mL   TSH   Result Value Ref Range    TSH 1.840 0.450 - 4.500 uIU/mL   Vitamin B12   Result Value Ref Range    Vitamin B-12 510 232 - 1,245 pg/mL   Folate   Result Value Ref Range    Folate 11.3 >3.0 ng/mL   CBC & Differential   Result Value Ref Range    WBC 6.3 3.4 - 10.8 x10E3/uL    RBC 5.07 3.77 - 5.28 x10E6/uL    Hemoglobin 15.8 11.1 - 15.9 g/dL    Hematocrit 47.4 (H) 34.0 - 46.6 %    MCV 94 79 - 97 fL    MCH 31.2 26.6 - 33.0 pg    MCHC 33.3 31.5 - 35.7 g/dL    RDW 13.7 12.3 - 15.4 %    Platelets 346 150 - 379 x10E3/uL    Neutrophil Rel % 52 Not Estab. %    Lymphocyte Rel % 36 Not Estab. %    Monocyte Rel % 8 Not Estab. %    Eosinophil Rel % 3 Not Estab. %    Basophil Rel % 1 Not Estab. %     Neutrophils Absolute 3.3 1.4 - 7.0 x10E3/uL    Lymphocytes Absolute 2.2 0.7 - 3.1 x10E3/uL    Monocytes Absolute 0.5 0.1 - 0.9 x10E3/uL    Eosinophils Absolute 0.2 0.0 - 0.4 x10E3/uL    Basophils Absolute 0.1 0.0 - 0.2 x10E3/uL    Immature Granulocyte Rel % 0 Not Estab. %    Immature Grans Absolute 0.0 0.0 - 0.1 x10E3/uL   no ACE cough; 110/80 today  Bruising more with Plavix and ASA---contact vasc doc  I did raise bp dose when she emailed me about bp still up  Did fine with carotid procedure Dec  Declines low dose CT chest  Needs f/u urologist stone  The following portions of the patient's history were reviewed and updated as appropriate: allergies, current medications, past family history, past medical history, past social history, past surgical history and problem list.    Review of Systems   Constitutional: Negative for activity change, appetite change and unexpected weight change.   HENT: Negative for nosebleeds and trouble swallowing.    Eyes: Negative for pain and visual disturbance.   Respiratory: Negative for chest tightness, shortness of breath and wheezing.    Cardiovascular: Negative for chest pain and palpitations.   Gastrointestinal: Negative for abdominal pain and blood in stool.   Endocrine: Negative.    Genitourinary: Negative for difficulty urinating and hematuria.   Musculoskeletal: Negative for joint swelling.   Skin: Negative for color change and rash.   Allergic/Immunologic: Negative.    Neurological: Negative for syncope and speech difficulty.   Hematological: Negative for adenopathy. Bruises/bleeds easily.   Psychiatric/Behavioral: Negative for agitation and confusion.   All other systems reviewed and are negative.      Objective   Physical Exam   Constitutional: She is oriented to person, place, and time. She appears well-developed and well-nourished. No distress.   HENT:   Head: Normocephalic and atraumatic.   Eyes: Conjunctivae and EOM are normal. Pupils are equal, round, and reactive to  light. Right eye exhibits no discharge. Left eye exhibits no discharge. No scleral icterus.   Neck: Normal range of motion. Neck supple.   Cardiovascular: Normal rate, regular rhythm, normal heart sounds, intact distal pulses and normal pulses. Exam reveals no gallop.   No murmur heard.  I did not hear carotid bruits   Pulmonary/Chest: Effort normal and breath sounds normal. No respiratory distress. She has no wheezes. She has no rales.   Abdominal: Soft.   Musculoskeletal: Normal range of motion.   Lymphadenopathy:     She has no cervical adenopathy.   Neurological: She is alert and oriented to person, place, and time. She exhibits normal muscle tone. Coordination normal.   Skin: Skin is warm. No rash noted. No erythema. No pallor.   Psychiatric: She has a normal mood and affect. Her behavior is normal. Judgment and thought content normal.   Nursing note and vitals reviewed.      Assessment/Plan   Nelsy was seen today for rash.    Diagnoses and all orders for this visit:    Essential hypertension

## 2019-02-13 NOTE — PATIENT INSTRUCTIONS
Low glycemic index diet  Exercise 30 minutes most days of the week  Make sure you get results on any labs or tests we ordered today  We discussed medications and how to take them as prescribed  Sleep 6-8 hours each night if possible  If you have not signed up for [employer], please activate your code ASAP from your After Visit Summary today    LDL goal <100  LDL goal if heart disease <70  HDL goal >60  Triglyceride goal <150  BP goal =<130/80  Fasting glucose <100

## 2019-03-01 RX ORDER — ALENDRONATE SODIUM 70 MG/1
70 TABLET ORAL
Qty: 13 TABLET | Refills: 3 | Status: SHIPPED | OUTPATIENT
Start: 2019-03-01 | End: 2019-08-19

## 2019-04-05 ENCOUNTER — OFFICE VISIT (OUTPATIENT)
Dept: FAMILY MEDICINE CLINIC | Facility: CLINIC | Age: 64
End: 2019-04-05

## 2019-04-05 VITALS
HEIGHT: 68 IN | DIASTOLIC BLOOD PRESSURE: 64 MMHG | WEIGHT: 158 LBS | BODY MASS INDEX: 23.95 KG/M2 | TEMPERATURE: 98.4 F | RESPIRATION RATE: 16 BRPM | SYSTOLIC BLOOD PRESSURE: 122 MMHG | HEART RATE: 78 BPM | OXYGEN SATURATION: 96 %

## 2019-04-05 DIAGNOSIS — R05.8 ACE-INHIBITOR COUGH: Primary | ICD-10-CM

## 2019-04-05 DIAGNOSIS — T46.4X5A ACE-INHIBITOR COUGH: Primary | ICD-10-CM

## 2019-04-05 DIAGNOSIS — J20.9 ACUTE BRONCHITIS DUE TO INFECTION: ICD-10-CM

## 2019-04-05 PROCEDURE — 99213 OFFICE O/P EST LOW 20 MIN: CPT | Performed by: PHYSICIAN ASSISTANT

## 2019-04-05 RX ORDER — PREDNISONE 10 MG/1
10 TABLET ORAL
Qty: 15 TABLET | Refills: 0 | Status: SHIPPED | OUTPATIENT
Start: 2019-04-05 | End: 2019-04-29

## 2019-04-05 RX ORDER — AZITHROMYCIN 250 MG/1
TABLET, FILM COATED ORAL
Qty: 6 TABLET | Refills: 1 | Status: SHIPPED | OUTPATIENT
Start: 2019-04-05 | End: 2019-04-29

## 2019-04-05 RX ORDER — VALSARTAN 160 MG/1
160 TABLET ORAL DAILY
Qty: 90 TABLET | Refills: 0 | Status: SHIPPED | OUTPATIENT
Start: 2019-04-05 | End: 2019-04-29 | Stop reason: SDUPTHER

## 2019-04-05 NOTE — PATIENT INSTRUCTIONS
For throat pain:  Can gargle with 1/2 Maalox and 1/2 Benadryl liquid ---mix, gargle and spit Take Tylenol for fever or aches  Rest as needed  Call not better in 7 days  Increase fluids  Call if worse  Can use generic Afrin nasal spray up to 5 days for nasal congestion  Finish antibiotic course of therapy    Acute Bronchitis, Adult  Acute bronchitis is sudden (acute) swelling of the air tubes (bronchi) in the lungs. Acute bronchitis causes these tubes to fill with mucus, which can make it hard to breathe. It can also cause coughing or wheezing.  In adults, acute bronchitis usually goes away within 2 weeks. A cough caused by bronchitis may last up to 3 weeks. Smoking, allergies, and asthma can make the condition worse. Repeated episodes of bronchitis may cause further lung problems, such as chronic obstructive pulmonary disease (COPD).  What are the causes?  This condition can be caused by germs and by substances that irritate the lungs, including:  · Cold and flu viruses. This condition is most often caused by the same virus that causes a cold.  · Bacteria.  · Exposure to tobacco smoke, dust, fumes, and air pollution.    What increases the risk?  This condition is more likely to develop in people who:  · Have close contact with someone with acute bronchitis.  · Are exposed to lung irritants, such as tobacco smoke, dust, fumes, and vapors.  · Have a weak immune system.  · Have a respiratory condition such as asthma.    What are the signs or symptoms?  Symptoms of this condition include:  · A cough.  · Coughing up clear, yellow, or green mucus.  · Wheezing.  · Chest congestion.  · Shortness of breath.  · A fever.  · Body aches.  · Chills.  · A sore throat.    How is this diagnosed?  This condition is usually diagnosed with a physical exam. During the exam, your health care provider may order tests, such as chest X-rays, to rule out other conditions. He or she may also:  · Test a sample of your mucus for bacterial  infection.  · Check the level of oxygen in your blood. This is done to check for pneumonia.  · Do a chest X-ray or lung function testing to rule out pneumonia and other conditions.  · Perform blood tests.    Your health care provider will also ask about your symptoms and medical history.  How is this treated?  Most cases of acute bronchitis clear up over time without treatment. Your health care provider may recommend:  · Drinking more fluids. Drinking more makes your mucus thinner, which may make it easier to breathe.  · Taking a medicine for a fever or cough.  · Taking an antibiotic medicine.  · Using an inhaler to help improve shortness of breath and to control a cough.  · Using a cool mist vaporizer or humidifier to make it easier to breathe.    Follow these instructions at home:  Medicines  · Take over-the-counter and prescription medicines only as told by your health care provider.  · If you were prescribed an antibiotic, take it as told by your health care provider. Do not stop taking the antibiotic even if you start to feel better.  General instructions  · Get plenty of rest.  · Drink enough fluids to keep your urine pale yellow.  · Avoid smoking and secondhand smoke. Exposure to cigarette smoke or irritating chemicals will make bronchitis worse. If you smoke and you need help quitting, ask your health care provider. Quitting smoking will help your lungs heal faster.  · Use an inhaler, cool mist vaporizer, or humidifier as told by your health care provider.  · Keep all follow-up visits as told by your health care provider. This is important.  How is this prevented?  To lower your risk of getting this condition again:  · Wash your hands often with soap and water. If soap and water are not available, use hand .  · Avoid contact with people who have cold symptoms.  · Try not to touch your hands to your mouth, nose, or eyes.  · Make sure to get the flu shot every year.    Contact a health care provider  if:  · Your symptoms do not improve in 2 weeks of treatment.  Get help right away if:  · You cough up blood.  · You have chest pain.  · You have severe shortness of breath.  · You become dehydrated.  · You faint or keep feeling like you are going to faint.  · You keep vomiting.  · You have a severe headache.  · Your fever or chills gets worse.  This information is not intended to replace advice given to you by your health care provider. Make sure you discuss any questions you have with your health care provider.  Document Released: 01/25/2006 Document Revised: 08/01/2018 Document Reviewed: 06/07/2017  [employer] Patient Education © 2019 [employer]

## 2019-04-05 NOTE — PROGRESS NOTES
Subjective   Nelsy Stokes is a 64 y.o. female.     History of Present Illness   Nelsy Stokes 64 y.o. female who presents for evaluation of continued cough after URI from 3 weeks ago.  Head congestion better, but still coughing from her chest; . Symptoms include cough described as productive of yellow sputum and deep bronchial sounding cough.  Onset of symptoms was 3 weeks ago, unchanged since that time. Patient denies shortness of breath, fever.   Evaluation to date: none Treatment to date:  OTC cough suppressant and Mucinex.   Had CXR Dec    The following portions of the patient's history were reviewed and updated as appropriate: allergies, current medications, past family history, past medical history, past social history, past surgical history and problem list.    Review of Systems   Constitutional: Positive for fatigue. Negative for activity change and unexpected weight change.   HENT: Positive for postnasal drip. Negative for congestion, ear pain and sore throat.    Eyes: Negative for pain and discharge.   Respiratory: Positive for cough and wheezing. Negative for chest tightness and shortness of breath.    Cardiovascular: Negative for chest pain and palpitations.   Gastrointestinal: Negative for abdominal pain, diarrhea and vomiting.   Endocrine: Negative.    Genitourinary: Negative.    Musculoskeletal: Negative for joint swelling.   Skin: Negative for color change, rash and wound.   Allergic/Immunologic: Negative.    Neurological: Negative for seizures and syncope.   Psychiatric/Behavioral: Negative.        Objective   Physical Exam   Constitutional: She is oriented to person, place, and time. She appears well-developed and well-nourished.  Non-toxic appearance. No distress.   HENT:   Head: Normocephalic and atraumatic. Hair is normal.   Right Ear: External ear normal. No drainage, swelling or tenderness. Tympanic membrane is retracted.   Left Ear: External ear normal. No drainage, swelling or tenderness.  Tympanic membrane is retracted.   Nose: Mucosal edema present. No epistaxis.   Mouth/Throat: Uvula is midline and mucous membranes are normal. No oral lesions. No uvula swelling. Posterior oropharyngeal erythema present. No oropharyngeal exudate.   Eyes: Conjunctivae and EOM are normal. Pupils are equal, round, and reactive to light. Right eye exhibits no discharge. Left eye exhibits no discharge. No scleral icterus.   Neck: Normal range of motion. Neck supple.   Cardiovascular: Normal rate, regular rhythm and normal heart sounds. Exam reveals no gallop.   No murmur heard.  Pulmonary/Chest: Breath sounds normal. No stridor. No respiratory distress. She has no wheezes. She has no rales. She exhibits no tenderness.   Abdominal: Soft. There is no tenderness.   Lymphadenopathy:     She has no cervical adenopathy.   Neurological: She is alert and oriented to person, place, and time. She exhibits normal muscle tone.   Skin: Skin is warm and dry. No rash noted. She is not diaphoretic.   Psychiatric: She has a normal mood and affect. Her behavior is normal. Judgment and thought content normal.   Nursing note and vitals reviewed.      Assessment/Plan   Nelsy was seen today for illness.    Diagnoses and all orders for this visit:    ACE-inhibitor cough    Acute bronchitis due to infection    Other orders  -     valsartan (DIOVAN) 160 MG tablet; Take 1 tablet by mouth Daily. One PO daily for BP---this replaces Lisinopril          Concern about ACE cough and acute bronchitis; will treat this as infection and change to ARB

## 2019-04-29 ENCOUNTER — OFFICE VISIT (OUTPATIENT)
Dept: FAMILY MEDICINE CLINIC | Facility: CLINIC | Age: 64
End: 2019-04-29

## 2019-04-29 VITALS
BODY MASS INDEX: 23.95 KG/M2 | HEART RATE: 70 BPM | OXYGEN SATURATION: 97 % | TEMPERATURE: 98.4 F | HEIGHT: 68 IN | RESPIRATION RATE: 16 BRPM | WEIGHT: 158 LBS | DIASTOLIC BLOOD PRESSURE: 62 MMHG | SYSTOLIC BLOOD PRESSURE: 110 MMHG

## 2019-04-29 DIAGNOSIS — I70.0 AORTIC ATHEROSCLEROSIS (HCC): ICD-10-CM

## 2019-04-29 DIAGNOSIS — I10 ESSENTIAL HYPERTENSION: Primary | ICD-10-CM

## 2019-04-29 DIAGNOSIS — K21.00 GASTROESOPHAGEAL REFLUX DISEASE WITH ESOPHAGITIS: ICD-10-CM

## 2019-04-29 DIAGNOSIS — M85.89 OSTEOPENIA OF MULTIPLE SITES: ICD-10-CM

## 2019-04-29 DIAGNOSIS — E78.2 MIXED HYPERLIPIDEMIA: ICD-10-CM

## 2019-04-29 DIAGNOSIS — R05.9 COUGH: ICD-10-CM

## 2019-04-29 PROCEDURE — 99214 OFFICE O/P EST MOD 30 MIN: CPT | Performed by: PHYSICIAN ASSISTANT

## 2019-04-29 RX ORDER — VALSARTAN 160 MG/1
160 TABLET ORAL DAILY
Qty: 90 TABLET | Refills: 1 | Status: SHIPPED | OUTPATIENT
Start: 2019-04-29 | End: 2020-01-30

## 2019-04-29 RX ORDER — PANTOPRAZOLE SODIUM 40 MG/1
40 TABLET, DELAYED RELEASE ORAL DAILY
Qty: 90 TABLET | Refills: 1 | Status: SHIPPED | OUTPATIENT
Start: 2019-04-29 | End: 2019-11-19 | Stop reason: SDUPTHER

## 2019-04-29 RX ORDER — ALBUTEROL SULFATE 90 UG/1
2 AEROSOL, METERED RESPIRATORY (INHALATION) EVERY 4 HOURS PRN
Qty: 1 INHALER | Refills: 11 | Status: SHIPPED | OUTPATIENT
Start: 2019-04-29 | End: 2022-11-05 | Stop reason: SDUPTHER

## 2019-04-29 NOTE — PROGRESS NOTES
Subjective   Nelsy Stokes is a 64 y.o. female.     History of Present Illness   Nelsy Stokes 64 y.o. female who presents today for routine follow up check and medication refills.  she has a history of   Patient Active Problem List   Diagnosis   • Asymptomatic microscopic hematuria   • Subclavian arterial stenosis (CMS/HCC)   • PAD (peripheral artery disease) (CMS/HCC)   • Bilateral carotid artery disease (CMS/HCC)   • Mixed hyperlipidemia   • Osteopenia of multiple sites   • Aortic atherosclerosis (CMS/HCC)   • Left carotid artery stenosis   • Essential hypertension   • Situational anxiety   .  Since the last visit, she has overall felt well.  She has Hypertenision and is well controlled on medication and Hyperlipidemia and here to discuss treatment.  she has been compliant with current medications have reviewed them.  The patient denies medication side effects. Intol to statins. Sees vascular for PAD and carotid disease.  I did change ACE to ARB and helped cough but still there  CXR Dec neg    Results for orders placed or performed in visit on 01/18/19   T4, Free   Result Value Ref Range    Free T4 1.15 0.82 - 1.77 ng/dL   T3, Free   Result Value Ref Range    T3, Free 2.9 2.0 - 4.4 pg/mL   TSH   Result Value Ref Range    TSH 1.840 0.450 - 4.500 uIU/mL   Vitamin B12   Result Value Ref Range    Vitamin B-12 510 232 - 1,245 pg/mL   Folate   Result Value Ref Range    Folate 11.3 >3.0 ng/mL   CBC & Differential   Result Value Ref Range    WBC 6.3 3.4 - 10.8 x10E3/uL    RBC 5.07 3.77 - 5.28 x10E6/uL    Hemoglobin 15.8 11.1 - 15.9 g/dL    Hematocrit 47.4 (H) 34.0 - 46.6 %    MCV 94 79 - 97 fL    MCH 31.2 26.6 - 33.0 pg    MCHC 33.3 31.5 - 35.7 g/dL    RDW 13.7 12.3 - 15.4 %    Platelets 346 150 - 379 x10E3/uL    Neutrophil Rel % 52 Not Estab. %    Lymphocyte Rel % 36 Not Estab. %    Monocyte Rel % 8 Not Estab. %    Eosinophil Rel % 3 Not Estab. %    Basophil Rel % 1 Not Estab. %    Neutrophils Absolute 3.3 1.4 - 7.0  x10E3/uL    Lymphocytes Absolute 2.2 0.7 - 3.1 x10E3/uL    Monocytes Absolute 0.5 0.1 - 0.9 x10E3/uL    Eosinophils Absolute 0.2 0.0 - 0.4 x10E3/uL    Basophils Absolute 0.1 0.0 - 0.2 x10E3/uL    Immature Granulocyte Rel % 0 Not Estab. %    Immature Grans Absolute 0.0 0.0 - 0.1 x10E3/uL       She reports cough is better but still there; some wheezing at times.  She is having daily GERD.    Nelsy Stokes 64 y.o. female who presents for evaluation of GERD. Symptoms have been present for several months .  The condition is aggravated by eating any food and liquids . she is experiencing no other GI signs or symptoms.  Alleviating factors are old PPI with great when took Rx . Patient denies diarrhea, constipation, melena and bright red blood in stool her past medical history is notable for GERD.  Patient denies recent travel.    No episodes of abd pain; ?could be from Fosamax also    She is on Plavix and ASA    The following portions of the patient's history were reviewed and updated as appropriate: allergies, current medications, past family history, past medical history, past social history, past surgical history and problem list.    Review of Systems   Constitutional: Negative for activity change, appetite change and unexpected weight change.   HENT: Negative for nosebleeds and trouble swallowing.    Eyes: Negative for pain and visual disturbance.   Respiratory: Positive for cough. Negative for chest tightness, shortness of breath and wheezing.    Cardiovascular: Negative for chest pain and palpitations.   Gastrointestinal: Negative for abdominal pain and blood in stool.   Endocrine: Negative.    Genitourinary: Negative for difficulty urinating and hematuria.   Musculoskeletal: Negative for joint swelling.   Skin: Negative for color change and rash.   Allergic/Immunologic: Negative.    Neurological: Negative for syncope and speech difficulty.   Hematological: Negative for adenopathy.   Psychiatric/Behavioral: Negative  for agitation and confusion.   All other systems reviewed and are negative.      Objective   Physical Exam   Constitutional: She is oriented to person, place, and time. She appears well-developed and well-nourished. No distress.   HENT:   Head: Normocephalic and atraumatic.   Eyes: Conjunctivae and EOM are normal. Pupils are equal, round, and reactive to light. Right eye exhibits no discharge. Left eye exhibits no discharge. No scleral icterus.   Neck: Normal range of motion. Neck supple.   Cardiovascular: Normal rate, regular rhythm, normal heart sounds and normal pulses. Exam reveals no gallop.   No murmur heard.  I did not hear carotid bruits; decreased pedal pulses   Pulmonary/Chest: Effort normal and breath sounds normal. No respiratory distress. She has no wheezes. She has no rales.   Abdominal: Soft. There is tenderness (epigastric).   Musculoskeletal: Normal range of motion.   Lymphadenopathy:     She has no cervical adenopathy.   Neurological: She is alert and oriented to person, place, and time. She exhibits normal muscle tone. Coordination normal.   Skin: Skin is warm. No rash noted. No erythema. No pallor.   Psychiatric: She has a normal mood and affect. Her behavior is normal. Judgment and thought content normal.   Nursing note and vitals reviewed.      Assessment/Plan   There are no diagnoses linked to this encounter.  In summary, Nelsy Stokes, was seen today.  she was seen for  Hypertenision and is well controlled on medication, Hyperlipidemia and here to discuss treatment and ACE changed to ARB; also GERD,    Fosamax may be cause of GERD---hold 2 mos then retry and update me    Will Rx PPI for GERD; elevate HOB; if still cough after 2 weeks, need pulm consult to work up  GERD info

## 2019-04-29 NOTE — PATIENT INSTRUCTIONS
Low glycemic index diet  Exercise 30 minutes most days of the week  Make sure you get results on any labs or tests we ordered today  We discussed medications and how to take them as prescribed  Sleep 6-8 hours each night if possible  If you have not signed up for [place], please activate your code ASAP from your After Visit Summary today    LDL goal <100  LDL goal if heart disease <70  HDL goal >60  Triglyceride goal <150  BP goal =<130/80  Fasting glucose <100    Food Choices for Gastroesophageal Reflux Disease, Adult  When you have gastroesophageal reflux disease (GERD), the foods you eat and your eating habits are very important. Choosing the right foods can help ease the discomfort of GERD. Consider working with a diet and nutrition specialist (dietitian) to help you make healthy food choices.  What general guidelines should I follow?  Eating plan  · Choose healthy foods low in fat, such as fruits, vegetables, whole grains, low-fat dairy products, and lean meat, fish, and poultry.  · Eat frequent, small meals instead of three large meals each day. Eat your meals slowly, in a relaxed setting. Avoid bending over or lying down until 2-3 hours after eating.  · Limit high-fat foods such as fatty meats or fried foods.  · Limit your intake of oils, butter, and shortening to less than 8 teaspoons each day.  · Avoid the following:  ? Foods that cause symptoms. These may be different for different people. Keep a food diary to keep track of foods that cause symptoms.  ? Alcohol.  ? Drinking large amounts of liquid with meals.  ? Eating meals during the 2-3 hours before bed.  · Cook foods using methods other than frying. This may include baking, grilling, or broiling.  Lifestyle    · Maintain a healthy weight. Ask your health care provider what weight is healthy for you. If you need to lose weight, work with your health care provider to do so safely.  · Exercise for at least 30 minutes on 5 or more days each week, or as  told by your health care provider.  · Avoid wearing clothes that fit tightly around your waist and chest.  · Do not use any products that contain nicotine or tobacco, such as cigarettes and e-cigarettes. If you need help quitting, ask your health care provider.  · Sleep with the head of your bed raised. Use a wedge under the mattress or blocks under the bed frame to raise the head of the bed.  What foods are not recommended?  The items listed may not be a complete list. Talk with your dietitian about what dietary choices are best for you.  Grains  Pastries or quick breads with added fat. French toast.  Vegetables  Deep fried vegetables. French fries. Any vegetables prepared with added fat. Any vegetables that cause symptoms. For some people this may include tomatoes and tomato products, chili peppers, onions and garlic, and horseradish.  Fruits  Any fruits prepared with added fat. Any fruits that cause symptoms. For some people this may include citrus fruits, such as oranges, grapefruit, pineapple, and lyndsay.  Meats and other protein foods  High-fat meats, such as fatty beef or pork, hot dogs, ribs, ham, sausage, salami and berman. Fried meat or protein, including fried fish and fried chicken. Nuts and nut butters.  Dairy  Whole milk and chocolate milk. Sour cream. Cream. Ice cream. Cream cheese. Milk shakes.  Beverages  Coffee and tea, with or without caffeine. Carbonated beverages. Sodas. Energy drinks. Fruit juice made with acidic fruits (such as orange or grapefruit). Tomato juice. Alcoholic drinks.  Fats and oils  Butter. Margarine. Shortening. Ghee.  Sweets and desserts  Chocolate and cocoa. Donuts.  Seasoning and other foods  Pepper. Peppermint and spearmint. Any condiments, herbs, or seasonings that cause symptoms. For some people, this may include farrar, hot sauce, or vinegar-based salad dressings.  Summary  · When you have gastroesophageal reflux disease (GERD), food and lifestyle choices are very  important to help ease the discomfort of GERD.  · Eat frequent, small meals instead of three large meals each day. Eat your meals slowly, in a relaxed setting. Avoid bending over or lying down until 2-3 hours after eating.  · Limit high-fat foods such as fatty meat or fried foods.  This information is not intended to replace advice given to you by your health care provider. Make sure you discuss any questions you have with your health care provider.  Document Released: 12/18/2006 Document Revised: 12/19/2017 Document Reviewed: 12/19/2017  [employer] Interactive Patient Education © 2019 [employer]    Heartburn  Heartburn is a type of pain or discomfort that can happen in the throat or chest. It is often described as a burning pain. It may also cause a bad taste in the mouth. Heartburn may feel worse when you lie down or bend over, and it is often worse at night. Heartburn may be caused by stomach contents that move back up into the esophagus (reflux).  Follow these instructions at home:  Take these actions to decrease your discomfort and to help avoid complications.  Diet  · Follow a diet as recommended by your health care provider. This may involve avoiding foods and drinks such as:  ? Coffee and tea (with or without caffeine).  ? Drinks that contain alcohol.  ? Energy drinks and sports drinks.  ? Carbonated drinks or sodas.  ? Chocolate and cocoa.  ? Peppermint and mint flavorings.  ? Garlic and onions.  ? Horseradish.  ? Spicy and acidic foods, including peppers, chili powder, farrar powder, vinegar, hot sauces, and barbecue sauce.  ? Citrus fruit juices and citrus fruits, such as oranges, lyndsay, and limes.  ? Tomato-based foods, such as red sauce, chili, salsa, and pizza with red sauce.  ? Fried and fatty foods, such as donuts, french fries, potato chips, and high-fat dressings.  ? High-fat meats, such as hot dogs and fatty cuts of red and white meats, such as rib eye steak, sausage, ham, and berman.  ? High-fat  dairy items, such as whole milk, butter, and cream cheese.  · Eat small, frequent meals instead of large meals.  · Avoid drinking large amounts of liquid with your meals.  · Avoid eating meals during the 2-3 hours before bedtime.  · Avoid lying down right after you eat.  · Do not exercise right after you eat.  General instructions  · Pay attention to any changes in your symptoms.  · Take over-the-counter and prescription medicines only as told by your health care provider. Do not take aspirin, ibuprofen, or other NSAIDs unless your health care provider told you to do so.  · Do not use any tobacco products, including cigarettes, chewing tobacco, and e-cigarettes. If you need help quitting, ask your health care provider.  · Wear loose-fitting clothing. Do not wear anything tight around your waist that causes pressure on your abdomen.  · Raise (elevate) the head of your bed about 6 inches (15 cm).  · Try to reduce your stress, such as with yoga or meditation. If you need help reducing stress, ask your health care provider.  · If you are overweight, reduce your weight to an amount that is healthy for you. Ask your health care provider for guidance about a safe weight loss goal.  · Keep all follow-up visits as told by your health care provider. This is important.  Contact a health care provider if:  · You have new symptoms.  · You have unexplained weight loss.  · You have difficulty swallowing, or it hurts to swallow.  · You have wheezing or a persistent cough.  · Your symptoms do not improve with treatment.  · You have frequent heartburn for more than two weeks.  Get help right away if:  · You have pain in your arms, neck, jaw, teeth, or back.  · You feel sweaty, dizzy, or light-headed.  · You have chest pain or shortness of breath.  · You vomit and your vomit looks like blood or coffee grounds.  · Your stool is bloody or black.  This information is not intended to replace advice given to you by your health care  provider. Make sure you discuss any questions you have with your health care provider.  Document Released: 05/06/2010 Document Revised: 05/25/2017 Document Reviewed: 04/13/2016  [employer] Interactive Patient Education © 2019 [employer]

## 2019-05-02 DIAGNOSIS — D22.9 ATYPICAL NEVUS: Primary | ICD-10-CM

## 2019-07-24 ENCOUNTER — TELEPHONE (OUTPATIENT)
Dept: FAMILY MEDICINE CLINIC | Facility: CLINIC | Age: 64
End: 2019-07-24

## 2019-07-24 DIAGNOSIS — I65.23 BILATERAL CAROTID ARTERY STENOSIS: Chronic | ICD-10-CM

## 2019-07-24 DIAGNOSIS — R73.01 IMPAIRED FASTING GLUCOSE: ICD-10-CM

## 2019-07-24 DIAGNOSIS — E55.9 VITAMIN D DEFICIENCY: ICD-10-CM

## 2019-07-24 DIAGNOSIS — I10 ESSENTIAL HYPERTENSION: ICD-10-CM

## 2019-07-24 DIAGNOSIS — I73.9 PAD (PERIPHERAL ARTERY DISEASE) (HCC): Primary | Chronic | ICD-10-CM

## 2019-07-24 DIAGNOSIS — E78.2 MIXED HYPERLIPIDEMIA: ICD-10-CM

## 2019-07-27 LAB
25(OH)D3+25(OH)D2 SERPL-MCNC: 57.1 NG/ML (ref 30–100)
ALBUMIN SERPL-MCNC: 4.3 G/DL (ref 3.5–5.2)
ALBUMIN/GLOB SERPL: 1.6 G/DL
ALP SERPL-CCNC: 78 U/L (ref 39–117)
ALT SERPL-CCNC: 13 U/L (ref 1–33)
AST SERPL-CCNC: 15 U/L (ref 1–32)
BILIRUB SERPL-MCNC: 0.5 MG/DL (ref 0.2–1.2)
BUN SERPL-MCNC: 10 MG/DL (ref 8–23)
BUN/CREAT SERPL: 10.6 (ref 7–25)
CALCIUM SERPL-MCNC: 9.7 MG/DL (ref 8.6–10.5)
CHLORIDE SERPL-SCNC: 101 MMOL/L (ref 98–107)
CHOLEST SERPL-MCNC: 202 MG/DL (ref 0–200)
CO2 SERPL-SCNC: 29.4 MMOL/L (ref 22–29)
CREAT SERPL-MCNC: 0.94 MG/DL (ref 0.57–1)
GLOBULIN SER CALC-MCNC: 2.7 GM/DL
GLUCOSE SERPL-MCNC: 79 MG/DL (ref 65–99)
HBA1C MFR BLD: 6.2 % (ref 4.8–5.6)
HDLC SERPL-MCNC: 53 MG/DL (ref 40–60)
LDLC SERPL CALC-MCNC: 130 MG/DL (ref 0–100)
POTASSIUM SERPL-SCNC: 5.2 MMOL/L (ref 3.5–5.2)
PROT SERPL-MCNC: 7 G/DL (ref 6–8.5)
SODIUM SERPL-SCNC: 142 MMOL/L (ref 136–145)
TRIGL SERPL-MCNC: 93 MG/DL (ref 0–150)
VLDLC SERPL CALC-MCNC: 18.6 MG/DL

## 2019-07-29 ENCOUNTER — OFFICE VISIT (OUTPATIENT)
Dept: FAMILY MEDICINE CLINIC | Facility: CLINIC | Age: 64
End: 2019-07-29

## 2019-07-29 VITALS
SYSTOLIC BLOOD PRESSURE: 122 MMHG | RESPIRATION RATE: 16 BRPM | HEIGHT: 68 IN | TEMPERATURE: 98.3 F | HEART RATE: 73 BPM | DIASTOLIC BLOOD PRESSURE: 68 MMHG | BODY MASS INDEX: 24.1 KG/M2 | WEIGHT: 159 LBS | OXYGEN SATURATION: 97 %

## 2019-07-29 DIAGNOSIS — F41.8 SITUATIONAL ANXIETY: Primary | ICD-10-CM

## 2019-07-29 DIAGNOSIS — I65.23 BILATERAL CAROTID ARTERY STENOSIS: Chronic | ICD-10-CM

## 2019-07-29 DIAGNOSIS — I10 ESSENTIAL HYPERTENSION: ICD-10-CM

## 2019-07-29 DIAGNOSIS — R73.01 IMPAIRED FASTING GLUCOSE: ICD-10-CM

## 2019-07-29 DIAGNOSIS — R45.89 DYSPHORIC MOOD: ICD-10-CM

## 2019-07-29 DIAGNOSIS — E78.2 MIXED HYPERLIPIDEMIA: ICD-10-CM

## 2019-07-29 DIAGNOSIS — K21.00 GASTROESOPHAGEAL REFLUX DISEASE WITH ESOPHAGITIS: ICD-10-CM

## 2019-07-29 PROCEDURE — 99214 OFFICE O/P EST MOD 30 MIN: CPT | Performed by: PHYSICIAN ASSISTANT

## 2019-07-29 RX ORDER — ESCITALOPRAM OXALATE 10 MG/1
10 TABLET ORAL DAILY
Qty: 30 TABLET | Refills: 1 | Status: SHIPPED | OUTPATIENT
Start: 2019-07-29 | End: 2019-08-19 | Stop reason: SDUPTHER

## 2019-07-29 NOTE — PROGRESS NOTES
Subjective   Nelsy Stokes is a 64 y.o. female.     History of Present Illness   Nelsy Stokes female 64 y.o. who presents today for new evaluation and treatment of Depression and Anxiety.  She has been having this for several months and have asked her about it.  More so after Dad  and now sister .  She is grouchy and overwhelmed, difficult to have fun. She will having tightness in her scapula from stress.  Was having neck and jaw soreness----clenching. She is staying tired. Low mood.  She is ready to let me try SSRI for a few mos.. Onset of symptoms was approximately several months ago.  She denies current suicidal and homicidal ideation. Risk factors are lifestyle of multiple roles and grief.  Previous treatment includes Vistaril PRN.  She complains of the following medication side effects: none.  The patient declines to go to counseling..    Nelsy Stokes 64 y.o. female who presents today for routine follow up check and medication refills.  she has a history of   Patient Active Problem List   Diagnosis   • Asymptomatic microscopic hematuria   • Subclavian arterial stenosis (CMS/HCC)   • PAD (peripheral artery disease) (CMS/HCC)   • Bilateral carotid artery disease (CMS/HCC)   • Mixed hyperlipidemia   • Osteopenia of multiple sites   • Aortic atherosclerosis (CMS/HCC)   • Left carotid artery stenosis   • Essential hypertension   • Situational anxiety   • Gastroesophageal reflux disease with esophagitis   • Dysphoric mood   • Impaired fasting glucose   .  Since the last visit, she has overall felt tired, depressed and anxious.  She has Hypertenision and is well controlled on medication, Impaired fasting glucose and will continue close lab follow up to watch for DMII, GERD and is well controlled on PPI medication, Hyperlipidemia and here to discuss treatment and Vitamin D deficiency and well controlled on medication and labs at goal >30.  she has been compliant with current medications have reviewed them.   The patient denies medication side effects.  Lipids went up some and so did A1C; need to work on diet and exercise and stop smoking. I will follow labs  She had PAD and sees vascular  Results for orders placed or performed in visit on 07/24/19   Comprehensive metabolic panel   Result Value Ref Range    Glucose 79 65 - 99 mg/dL    BUN 10 8 - 23 mg/dL    Creatinine 0.94 0.57 - 1.00 mg/dL    eGFR Non African Am 60 (L) >60 mL/min/1.73    eGFR African Am 73 >60 mL/min/1.73    BUN/Creatinine Ratio 10.6 7.0 - 25.0    Sodium 142 136 - 145 mmol/L    Potassium 5.2 3.5 - 5.2 mmol/L    Chloride 101 98 - 107 mmol/L    Total CO2 29.4 (H) 22.0 - 29.0 mmol/L    Calcium 9.7 8.6 - 10.5 mg/dL    Total Protein 7.0 6.0 - 8.5 g/dL    Albumin 4.30 3.50 - 5.20 g/dL    Globulin 2.7 gm/dL    A/G Ratio 1.6 g/dL    Total Bilirubin 0.5 0.2 - 1.2 mg/dL    Alkaline Phosphatase 78 39 - 117 U/L    AST (SGOT) 15 1 - 32 U/L    ALT (SGPT) 13 1 - 33 U/L   Lipid panel   Result Value Ref Range    Total Cholesterol 202 (H) 0 - 200 mg/dL    Triglycerides 93 0 - 150 mg/dL    HDL Cholesterol 53 40 - 60 mg/dL    VLDL Cholesterol 18.6 mg/dL    LDL Cholesterol  130 (H) 0 - 100 mg/dL   Vitamin D 25 Hydroxy   Result Value Ref Range    25 Hydroxy, Vitamin D 57.1 30.0 - 100.0 ng/ml   Hemoglobin A1c   Result Value Ref Range    Hemoglobin A1C 6.20 (H) 4.80 - 5.60 %         The following portions of the patient's history were reviewed and updated as appropriate: allergies, current medications, past family history, past medical history, past social history, past surgical history and problem list.    Review of Systems   Constitutional: Negative for activity change, appetite change and unexpected weight change.   HENT: Negative for nosebleeds and trouble swallowing.    Eyes: Negative for pain and visual disturbance.   Respiratory: Negative for chest tightness, shortness of breath and wheezing.    Cardiovascular: Negative for chest pain and palpitations.    Gastrointestinal: Negative for abdominal pain and blood in stool.   Endocrine: Negative.    Genitourinary: Negative for difficulty urinating and hematuria.   Musculoskeletal: Negative for joint swelling.   Skin: Negative for color change and rash.   Allergic/Immunologic: Negative.    Neurological: Negative for syncope and speech difficulty.   Hematological: Negative for adenopathy.   Psychiatric/Behavioral: Positive for dysphoric mood. Negative for agitation, confusion and sleep disturbance. The patient is not nervous/anxious.    All other systems reviewed and are negative.      Objective   Physical Exam   Constitutional: She is oriented to person, place, and time. She appears well-developed and well-nourished. No distress.   HENT:   Head: Normocephalic and atraumatic.   Eyes: Conjunctivae and EOM are normal. Pupils are equal, round, and reactive to light. Right eye exhibits no discharge. Left eye exhibits no discharge. No scleral icterus.   Neck: Normal range of motion. Neck supple.   Cardiovascular: Normal rate, regular rhythm, normal heart sounds, intact distal pulses and normal pulses. Exam reveals no gallop.   No murmur heard.  I did not hear carotid bruits; decreased pedal pulses   Pulmonary/Chest: Effort normal and breath sounds normal. No respiratory distress. She has no wheezes. She has no rales.   Abdominal: Soft. Tenderness: epigastric.   Musculoskeletal: Normal range of motion.   Lymphadenopathy:     She has no cervical adenopathy.   Neurological: She is alert and oriented to person, place, and time. She exhibits normal muscle tone. Coordination normal.   Skin: Skin is warm. No rash noted. No erythema. No pallor.   Psychiatric: Her behavior is normal. Judgment and thought content normal.   Nursing note and vitals reviewed.      Assessment/Plan   Problems Addressed this Visit        Cardiovascular and Mediastinum    Bilateral carotid artery disease (CMS/HCC) (Chronic)    Mixed hyperlipidemia     Essential hypertension       Digestive    Gastroesophageal reflux disease with esophagitis       Endocrine    Impaired fasting glucose       Other    Situational anxiety - Primary    Dysphoric mood        In summary, Nelsy Stokes, was seen today.  she was seen for  Hypertenision and is well controlled on medication, Impaired fasting glucose and will continue close lab follow up to watch for DMII, GERD and is well controlled on PPI medication, Hyperlipidemia and here to discuss treatment and Vitamin D deficiency and well controlled on medication and labs at goal >30,  Watch bruising with Plavix  I will follow labs  Trial Lexapro for anxiety and depression; ask spouse if snore--will work up sleep apnea if concern

## 2019-08-19 ENCOUNTER — OFFICE VISIT (OUTPATIENT)
Dept: FAMILY MEDICINE CLINIC | Facility: CLINIC | Age: 64
End: 2019-08-19

## 2019-08-19 VITALS
HEIGHT: 68 IN | TEMPERATURE: 97.8 F | HEART RATE: 81 BPM | RESPIRATION RATE: 16 BRPM | WEIGHT: 159 LBS | OXYGEN SATURATION: 97 % | SYSTOLIC BLOOD PRESSURE: 120 MMHG | DIASTOLIC BLOOD PRESSURE: 78 MMHG | BODY MASS INDEX: 24.1 KG/M2

## 2019-08-19 DIAGNOSIS — F41.8 SITUATIONAL ANXIETY: Primary | ICD-10-CM

## 2019-08-19 DIAGNOSIS — F43.21 GRIEF: ICD-10-CM

## 2019-08-19 PROCEDURE — 99213 OFFICE O/P EST LOW 20 MIN: CPT | Performed by: PHYSICIAN ASSISTANT

## 2019-08-19 RX ORDER — ESCITALOPRAM OXALATE 10 MG/1
10 TABLET ORAL DAILY
Qty: 90 TABLET | Refills: 1 | Status: SHIPPED | OUTPATIENT
Start: 2019-08-19 | End: 2019-11-19 | Stop reason: SDUPTHER

## 2019-08-19 NOTE — PROGRESS NOTES
Subjective   Nelsy Stokes is a 64 y.o. female.     History of Present Illness   Nelsy Stokes female 64 y.o. who presents today for follow up of Anxiety.  She reports doing better.  She is overall less anxious and is feeling more open to tell her adult children.  She feels like starting Lexapro has helped her anxiety and I have her on just 5 mg. Onset of symptoms was approximately several months ago.  She denies current suicidal and homicidal ideation. Risk factors are lifestyle of multiple roles, family situation/stress and grief.  Previous treatment includes current Rx.  She complains of the following medication side effects: none.  The patient declines to go to counseling..  Still grief over sister and Dad  Did see DR Norton for hematuria and did have stone---f/u one year  Just saw vascular doc    She is off Fosasmax d/t GI upset; Osteopenia----her oldest sister had hip fx  Cannot Rx Evista---concern about stroke risk; does not qualify for Prolia    Labs every 6mos  DEXA due June 2020    The following portions of the patient's history were reviewed and updated as appropriate: allergies, current medications, past family history, past medical history, past social history, past surgical history and problem list.    Review of Systems   Constitutional: Negative for activity change, appetite change and unexpected weight change.   HENT: Negative for nosebleeds and trouble swallowing.    Eyes: Negative for pain and visual disturbance.   Respiratory: Negative for chest tightness, shortness of breath and wheezing.    Cardiovascular: Negative for chest pain and palpitations.   Gastrointestinal: Negative for abdominal pain and blood in stool.   Endocrine: Negative.    Genitourinary: Negative for difficulty urinating and hematuria.   Musculoskeletal: Negative for joint swelling.   Skin: Negative for color change and rash.   Allergic/Immunologic: Negative.    Neurological: Negative for syncope and speech difficulty.    Hematological: Negative for adenopathy.   Psychiatric/Behavioral: Negative for agitation, confusion, dysphoric mood and sleep disturbance. The patient is not nervous/anxious.    All other systems reviewed and are negative.      Objective   Physical Exam   Constitutional: She is oriented to person, place, and time. She appears well-developed and well-nourished. No distress.   HENT:   Head: Normocephalic and atraumatic.   Eyes: Conjunctivae and EOM are normal. Pupils are equal, round, and reactive to light. Right eye exhibits no discharge. Left eye exhibits no discharge. No scleral icterus.   Neck: Normal range of motion. Neck supple.   Cardiovascular: Normal rate, regular rhythm, normal heart sounds, intact distal pulses and normal pulses. Exam reveals no gallop.   No murmur heard.  I did not hear carotid bruits; decreased pedal pulses   Pulmonary/Chest: Effort normal and breath sounds normal. No respiratory distress. She has no wheezes. She has no rales.   Abdominal: Soft. Tenderness: epigastric.   Musculoskeletal: Normal range of motion.   Lymphadenopathy:     She has no cervical adenopathy.   Neurological: She is alert and oriented to person, place, and time. She exhibits normal muscle tone. Coordination normal.   Skin: Skin is warm. No rash noted. No erythema. No pallor.   Psychiatric: Her behavior is normal. Judgment and thought content normal.   Nursing note and vitals reviewed.      Assessment/Plan   Problems Addressed this Visit        Other    Situational anxiety - Primary    Relevant Medications    escitalopram (LEXAPRO) 10 MG tablet      Other Visit Diagnoses     Grief          will keep dose at 5mg Lexapro for now and is helping anxiety and helps with grief    See me 3 mos f/u

## 2019-09-10 ENCOUNTER — OFFICE VISIT (OUTPATIENT)
Dept: RETAIL CLINIC | Facility: CLINIC | Age: 64
End: 2019-09-10

## 2019-09-10 VITALS
SYSTOLIC BLOOD PRESSURE: 124 MMHG | DIASTOLIC BLOOD PRESSURE: 70 MMHG | TEMPERATURE: 98.2 F | HEART RATE: 88 BPM | OXYGEN SATURATION: 95 %

## 2019-09-10 DIAGNOSIS — J40 BRONCHITIS: ICD-10-CM

## 2019-09-10 DIAGNOSIS — J06.9 UPPER RESPIRATORY TRACT INFECTION, UNSPECIFIED TYPE: Primary | ICD-10-CM

## 2019-09-10 PROCEDURE — 99213 OFFICE O/P EST LOW 20 MIN: CPT | Performed by: NURSE PRACTITIONER

## 2019-09-10 RX ORDER — BENZONATATE 100 MG/1
CAPSULE ORAL
Qty: 30 CAPSULE | Refills: 0 | Status: SHIPPED | OUTPATIENT
Start: 2019-09-10 | End: 2020-02-05

## 2019-09-10 RX ORDER — DOXYCYCLINE 100 MG/1
100 CAPSULE ORAL 2 TIMES DAILY
Qty: 10 CAPSULE | Refills: 0 | Status: SHIPPED | OUTPATIENT
Start: 2019-09-10 | End: 2019-10-01

## 2019-09-10 RX ORDER — GUAIFENESIN 600 MG/1
600 TABLET, EXTENDED RELEASE ORAL 2 TIMES DAILY
Qty: 28 TABLET | Refills: 0 | Status: SHIPPED | OUTPATIENT
Start: 2019-09-10 | End: 2019-09-24

## 2019-09-10 RX ORDER — PREDNISONE 20 MG/1
20 TABLET ORAL 2 TIMES DAILY
Qty: 10 TABLET | Refills: 0 | Status: SHIPPED | OUTPATIENT
Start: 2019-09-10 | End: 2019-10-01

## 2019-09-10 NOTE — PROGRESS NOTES
Subjective:     Nelsy Stokes is a 64 y.o.     URI    Episode onset: started 3 days ago. There has been no fever. Associated symptoms include congestion, coughing, headaches, sinus pain and wheezing. Ear pain: resolved. Sore throat: from drainage. She has tried nothing for the symptoms.         The following portions of the patient's history were reviewed and updated as appropriate: allergies, current medications, past family history, past medical history, past social history, past surgical history and problem list.      Review of Systems   Constitutional: Positive for chills and fatigue. Negative for fever.   HENT: Positive for congestion, postnasal drip, sinus pressure and sinus pain. Ear pain: resolved. Sore throat: from drainage.    Respiratory: Positive for cough and wheezing. Negative for shortness of breath.    Cardiovascular:        See history, CAD   Neurological: Positive for headaches.         Objective:      Physical Exam   Constitutional: She appears well-developed and well-nourished.   HENT:   Head: Normocephalic and atraumatic.   Right Ear: Ear canal normal. Right ear middle ear effusion: mild mucoid.   Left Ear: Tympanic membrane and ear canal normal.   Mouth/Throat: No oropharyngeal exudate or posterior oropharyngeal erythema.   Mild nasal congestion   Cardiovascular: Normal rate, regular rhythm, S1 normal, S2 normal and normal heart sounds.   Pulmonary/Chest: Effort normal. She has rhonchi in the right upper field, the right middle field and the left upper field.   Occasional cough at visit   Abdominal: Soft. Normal appearance and bowel sounds are normal.   Vitals reviewed.          Diagnoses and all orders for this visit:    Upper respiratory tract infection, unspecified type    Bronchitis    Other orders  -     doxycycline (MONODOX) 100 MG capsule; Take 1 capsule by mouth 2 (Two) Times a Day.  -     predniSONE (DELTASONE) 20 MG tablet; Take 1 tablet by mouth 2 (Two) Times a Day.  -      guaiFENesin (MUCINEX) 600 MG 12 hr tablet; Take 1 tablet by mouth 2 (Two) Times a Day for 14 days.  -     benzonatate (TESSALON PERLES) 100 MG capsule; 1 to 2 capsules 3 times a day

## 2019-10-01 ENCOUNTER — OFFICE VISIT (OUTPATIENT)
Dept: FAMILY MEDICINE CLINIC | Facility: CLINIC | Age: 64
End: 2019-10-01

## 2019-10-01 VITALS
BODY MASS INDEX: 24.1 KG/M2 | TEMPERATURE: 98.2 F | HEART RATE: 67 BPM | OXYGEN SATURATION: 98 % | WEIGHT: 159 LBS | RESPIRATION RATE: 16 BRPM | DIASTOLIC BLOOD PRESSURE: 78 MMHG | SYSTOLIC BLOOD PRESSURE: 110 MMHG | HEIGHT: 68 IN

## 2019-10-01 DIAGNOSIS — R05.9 COUGH: ICD-10-CM

## 2019-10-01 DIAGNOSIS — J06.9 ACUTE URI: ICD-10-CM

## 2019-10-01 DIAGNOSIS — Z72.0 TOBACCO ABUSE: ICD-10-CM

## 2019-10-01 DIAGNOSIS — J20.9 ACUTE BRONCHITIS DUE TO INFECTION: Primary | ICD-10-CM

## 2019-10-01 PROCEDURE — 71046 X-RAY EXAM CHEST 2 VIEWS: CPT | Performed by: PHYSICIAN ASSISTANT

## 2019-10-01 PROCEDURE — 99213 OFFICE O/P EST LOW 20 MIN: CPT | Performed by: PHYSICIAN ASSISTANT

## 2019-10-01 RX ORDER — FLUCONAZOLE 150 MG/1
150 TABLET ORAL ONCE
Qty: 1 TABLET | Refills: 2 | Status: SHIPPED | OUTPATIENT
Start: 2019-10-01 | End: 2019-10-01

## 2019-10-01 RX ORDER — AMOXICILLIN AND CLAVULANATE POTASSIUM 875; 125 MG/1; MG/1
1 TABLET, FILM COATED ORAL EVERY 12 HOURS SCHEDULED
Qty: 20 TABLET | Refills: 0 | Status: SHIPPED | OUTPATIENT
Start: 2019-10-01 | End: 2019-11-19

## 2019-10-01 RX ORDER — PREDNISONE 20 MG/1
20 TABLET ORAL 2 TIMES DAILY
Qty: 10 TABLET | Refills: 0 | Status: SHIPPED | OUTPATIENT
Start: 2019-10-01 | End: 2019-11-19

## 2019-10-01 NOTE — PROGRESS NOTES
Subjective   Nelsy Stokes is a 64 y.o. female.     History of Present Illness   Nelsy Stokes 64 y.o. female who presents for re-evaluation of cough, wheezing, fatigue. Symptoms include cough described as productive of yellow sputum, exertional SOA, wheezing and deep bronchial sounding cough.  Onset of symptoms was 3 weeks ago, gradually improving since that time. Patient denies fever.   Evaluation to date: was seen recently at [facility] Treatment to date:  antibiotics, Albuterol MDI and oral pred.   She was treated 9-10-19 and got some better on oral pred and Doxy; URI seems to be be gone and still cough and wheezing.  X-Ray  Interpretation report in house X-rays that I personally viewed    Relevant Clinical Issues/Diagnoses/Indications: smoker and cough        Clinical Findings:  Mild hyperinflation, normal heart size; ?tiny round 2mm calcification left mid lung          Comparative Data:  none          Date of Previous X-ray:    Change on current X-ray:       Low-Dose Lung Cancer CT Screening Visit                                                                                                                                     Shared Decision Making  I am discussing tobacco cessation today with Nelsy Stokes    SMOKING HISTORY:   Social History     Tobacco Use   Smoking Status Current Every Day Smoker   • Packs/day: 1.00   • Years: 45.00   • Pack years: 45.00   • Types: Cigarettes   Smokeless Tobacco Never Used   Tobacco Comment    caff use       Nelsy Stokes is currently smoking 1 pack(s) per day with a 50  pack year history.  Reports no use of alternate forms of tobacco, electronic cigarettes, marijuana or other substances.  Based on the recommendation of the [place] Preventive Services Task Force, this patient is at high risk for lung cancer and a low-dose CT screening scan is recommended.     We discussed the connection between Radon and Lung Cancer and the availability of free test kits.  The  patient reports exposure to second hand smoke.  Some second-hand smoke exposure as a child with Parents smoking in their home.    The patient has had no hemoptysis, unintentional weight loss or increasing shortness of breath. The patient is asymptomatic and has no signs or symptoms of lung cancer.     Together we discussed the potential benefits and potential harms of being screened for lung cancer including the potential for follow up diagnostic testing, risk for over diagnosis, false positive rate and radiation exposure using the [facility] standardized decision aid. We reviewed the patient's smoking history and counseled on the importance and health benefits of quitting smoking.      Chest: Lung sounds are clear to auscultation, no wheezes, rales or rhonchi, with symmetric air entry.  Oxygen saturation was measured today and reported in vital signs.     Smoking Cessation  DISCUSSION HELD TODAY:     We discussed that there are a number of resources and interventions to assist with smoking cessation including the 1-800-Quit Now line, Health Department programs, [place] [facility] resources, and use of the [place] Department of Health and Human Services five keys for quitting and quit plan worksheet.  On a scale of zero to ten, the patient rates their motivation and readiness to quit at a 0 out of 10 today.      Recommendations for continued lung cancer screening:      We discussed the NCCN guidelines for lung cancer screening and the patient verbalized understanding that annual screening is recommended until fifteen years beyond smoking as long as they have no other disease or comorbidity that would prevent them from receiving cancer treatments such as surgery should a lung cancer be detected. The [facility] Lung Cancer Screening Shared Decision-Making Tool was utilized as an aid in discussing whether or not screening was right. The patient has decided to proceed with a Low Dose Lung Cancer  Screening CT today. The patient is aware that the results of his screening will be shared with the referring provider or PCP as well.       The patient verbalizes understanding that results of this low dose lung CT exam will be called and that assistance will be provided in arranging any necessary follow-up.    After review of the NCCN guidelines and recommendations for ongoing screening, the patient verbalized understanding of recommendations for follow-up. We discussed the importance of continued annual screening until 15 years beyond smoking or until other life-limiting comorbidities are present. We discussed the impact of comorbidities and ability and willing to undergo diagnosis and treatment.    2 minutes out of 2  minutes face-to-face spent counseling patient on the health benefits of quitting tobacco, smoking cessation strategies and resources, as well as the importance of adherence to annual lung cancer low-dose CT screening.     The following portions of the patient's history were reviewed and updated as appropriate: allergies, current medications, past family history, past medical history, past social history, past surgical history and problem list.    Review of Systems   Constitutional: Negative for activity change, appetite change and unexpected weight change.   HENT: Negative for nosebleeds and trouble swallowing.    Eyes: Negative for pain and visual disturbance.   Respiratory: Positive for cough, shortness of breath and wheezing. Negative for chest tightness.    Cardiovascular: Negative for chest pain and palpitations.   Gastrointestinal: Negative for abdominal pain and blood in stool.   Endocrine: Negative.    Genitourinary: Negative for difficulty urinating and hematuria.   Musculoskeletal: Negative for joint swelling.   Skin: Negative for color change and rash.   Allergic/Immunologic: Negative.    Neurological: Negative for syncope and speech difficulty.   Hematological: Negative for adenopathy.    Psychiatric/Behavioral: Negative for agitation and confusion.   All other systems reviewed and are negative.      Objective   Physical Exam   Constitutional: She is oriented to person, place, and time. She appears well-developed and well-nourished. No distress.   HENT:   Head: Normocephalic and atraumatic.   Right Ear: External ear normal.   Left Ear: External ear normal.   Nose: Nose normal.   Mouth/Throat: Oropharynx is clear and moist. No oropharyngeal exudate.   Eyes: Conjunctivae and EOM are normal. Pupils are equal, round, and reactive to light. Right eye exhibits no discharge. Left eye exhibits no discharge. No scleral icterus.   Neck: Normal range of motion. Neck supple. No tracheal deviation present. No thyromegaly present.   Cardiovascular: Normal rate, regular rhythm, normal heart sounds, intact distal pulses and normal pulses. Exam reveals no gallop.   No murmur heard.  Pulmonary/Chest: Effort normal and breath sounds normal. No respiratory distress. She has no wheezes. She has no rales.   Musculoskeletal: Normal range of motion.   Lymphadenopathy:     She has no cervical adenopathy.   Neurological: She is alert and oriented to person, place, and time. She exhibits normal muscle tone. Coordination normal.   Skin: Skin is warm. No rash noted. No erythema. No pallor.   Psychiatric: She has a normal mood and affect. Her behavior is normal. Judgment and thought content normal.   Nursing note and vitals reviewed.      Assessment/Plan   Nelsy was seen today for uri.    Diagnoses and all orders for this visit:    Acute bronchitis due to infection    Tobacco abuse  -     CT Chest Low Dose Wo; Future  -     XR Chest PA & Lateral    Cough  -     XR Chest PA & Lateral    Acute URI    Other orders  -     amoxicillin-clavulanate (AUGMENTIN) 875-125 MG per tablet; Take 1 tablet by mouth Every 12 (Twelve) Hours. One PO BID for infection with food  -     fluconazole (DIFLUCAN) 150 MG tablet; Take 1 tablet by mouth 1  (One) Time for 1 dose. One PO X 1 for yeast infection  -     predniSONE (DELTASONE) 20 MG tablet; Take 1 tablet by mouth 2 (Two) Times a Day. With food for 5 days      Redo oral pred Rx  Augmentin for infx  CXR today  CT low dose screen in few weeks  Stop smoking

## 2019-10-23 ENCOUNTER — HOSPITAL ENCOUNTER (OUTPATIENT)
Dept: CT IMAGING | Facility: HOSPITAL | Age: 64
Discharge: HOME | End: 2019-10-23
Admitting: PHYSICIAN ASSISTANT

## 2019-10-23 DIAGNOSIS — Z72.0 TOBACCO ABUSE: ICD-10-CM

## 2019-10-23 PROCEDURE — G0297 LDCT FOR LUNG CA SCREEN: HCPCS

## 2019-10-25 ENCOUNTER — TELEPHONE (OUTPATIENT)
Dept: FAMILY MEDICINE CLINIC | Facility: CLINIC | Age: 64
End: 2019-10-25

## 2019-11-19 ENCOUNTER — OFFICE VISIT (OUTPATIENT)
Dept: FAMILY MEDICINE CLINIC | Facility: CLINIC | Age: 64
End: 2019-11-19

## 2019-11-19 VITALS
TEMPERATURE: 98.3 F | HEIGHT: 68 IN | SYSTOLIC BLOOD PRESSURE: 120 MMHG | HEART RATE: 69 BPM | RESPIRATION RATE: 16 BRPM | DIASTOLIC BLOOD PRESSURE: 80 MMHG | BODY MASS INDEX: 23.64 KG/M2 | WEIGHT: 156 LBS | OXYGEN SATURATION: 97 %

## 2019-11-19 DIAGNOSIS — F41.8 SITUATIONAL ANXIETY: ICD-10-CM

## 2019-11-19 DIAGNOSIS — R05.9 COUGH: ICD-10-CM

## 2019-11-19 DIAGNOSIS — E78.2 MIXED HYPERLIPIDEMIA: Primary | ICD-10-CM

## 2019-11-19 DIAGNOSIS — I65.22 LEFT CAROTID ARTERY STENOSIS: ICD-10-CM

## 2019-11-19 DIAGNOSIS — I10 ESSENTIAL HYPERTENSION: ICD-10-CM

## 2019-11-19 DIAGNOSIS — R45.89 DYSPHORIC MOOD: ICD-10-CM

## 2019-11-19 DIAGNOSIS — R73.01 IMPAIRED FASTING GLUCOSE: ICD-10-CM

## 2019-11-19 DIAGNOSIS — K21.9 GASTROESOPHAGEAL REFLUX DISEASE WITHOUT ESOPHAGITIS: ICD-10-CM

## 2019-11-19 PROCEDURE — 99214 OFFICE O/P EST MOD 30 MIN: CPT | Performed by: PHYSICIAN ASSISTANT

## 2019-11-19 RX ORDER — ESCITALOPRAM OXALATE 10 MG/1
TABLET ORAL
Qty: 90 TABLET | Refills: 1 | Status: SHIPPED | OUTPATIENT
Start: 2019-11-19 | End: 2020-02-11

## 2019-11-19 RX ORDER — FLUTICASONE PROPIONATE 50 MCG
SPRAY, SUSPENSION (ML) NASAL
Qty: 1 BOTTLE | Refills: 11
Start: 2019-11-19 | End: 2020-02-11

## 2019-11-19 RX ORDER — PANTOPRAZOLE SODIUM 40 MG/1
TABLET, DELAYED RELEASE ORAL
Qty: 180 TABLET | Refills: 1 | Status: SHIPPED | OUTPATIENT
Start: 2019-11-19 | End: 2020-07-20 | Stop reason: SDUPTHER

## 2019-11-19 RX ORDER — EZETIMIBE 10 MG/1
10 TABLET ORAL DAILY
Qty: 90 TABLET | Refills: 1 | Status: SHIPPED | OUTPATIENT
Start: 2019-11-19 | End: 2020-07-20 | Stop reason: SDUPTHER

## 2019-11-19 NOTE — PROGRESS NOTES
"Subjective   Nelsy Stokes is a 64 y.o. female.     History of Present Illness    Since the last visit, she has overall felt fairly well.  She has Essential Hypertension and well controlled on current medication, Impaired fasting glucose and will monitor labs to watch for DMII, GERD controlled on PPI Rx, Hyperlipidemia on Zetia and is effective with lowering lipid values and Vitamin D deficiency and labs are at goal >30 ng/mL.  she has been compliant with current medications have reviewed them.  The patient denies medication side effects.  Will refill medications. /72 (BP Location: Left arm, Patient Position: Sitting, Cuff Size: Large Adult)   Pulse 69   Temp 98.3 °F (36.8 °C) (Oral)   Resp 16   Ht 172.7 cm (67.99\")   Wt 70.8 kg (156 lb)   LMP  (LMP Unknown)   SpO2 97%   BMI 23.73 kg/m²     Results for orders placed or performed in visit on 07/24/19   Comprehensive metabolic panel   Result Value Ref Range    Glucose 79 65 - 99 mg/dL    BUN 10 8 - 23 mg/dL    Creatinine 0.94 0.57 - 1.00 mg/dL    eGFR Non African Am 60 (L) >60 mL/min/1.73    eGFR African Am 73 >60 mL/min/1.73    BUN/Creatinine Ratio 10.6 7.0 - 25.0    Sodium 142 136 - 145 mmol/L    Potassium 5.2 3.5 - 5.2 mmol/L    Chloride 101 98 - 107 mmol/L    Total CO2 29.4 (H) 22.0 - 29.0 mmol/L    Calcium 9.7 8.6 - 10.5 mg/dL    Total Protein 7.0 6.0 - 8.5 g/dL    Albumin 4.30 3.50 - 5.20 g/dL    Globulin 2.7 gm/dL    A/G Ratio 1.6 g/dL    Total Bilirubin 0.5 0.2 - 1.2 mg/dL    Alkaline Phosphatase 78 39 - 117 U/L    AST (SGOT) 15 1 - 32 U/L    ALT (SGPT) 13 1 - 33 U/L   Lipid panel   Result Value Ref Range    Total Cholesterol 202 (H) 0 - 200 mg/dL    Triglycerides 93 0 - 150 mg/dL    HDL Cholesterol 53 40 - 60 mg/dL    VLDL Cholesterol 18.6 mg/dL    LDL Cholesterol  130 (H) 0 - 100 mg/dL   Vitamin D 25 Hydroxy   Result Value Ref Range    25 Hydroxy, Vitamin D 57.1 30.0 - 100.0 ng/ml   Hemoglobin A1c   Result Value Ref Range    Hemoglobin A1C " 6.20 (H) 4.80 - 5.60 %           She cont to have cough and did do CXR and then CT low dose chest were neg  She will try MDI to see if helps; need to get PFTs; ? COPD?  No fam hx asthma;  See DR Hernandez    183/105 at home; running high at home----bp today 120/80  Keep med same for now  Some dull h/a --vice  like; not worse with ROM; occas TMJ pain---see dentist; start Flonase and see if helps PND cough and dull h/a.  Left side neck pain---f/u vascular--has stent left side.    She is off Fosasmax d/t GI upset; Osteopenia----her oldest sister had hip fx  Cannot Rx Evista---concern about stroke risk; does not qualify for Prolia  DEXA due June 2020     Sees vascular for PAD and carotid  intol to statins and on Zetia;    Lexapro is helping grief and anxiety  She is still on 5mg  The following portions of the patient's history were reviewed and updated as appropriate: allergies, current medications, past family history, past medical history, past social history, past surgical history and problem list.    Review of Systems   Constitutional: Negative for activity change, appetite change and unexpected weight change.   HENT: Negative for nosebleeds and trouble swallowing.    Eyes: Negative for pain and visual disturbance.   Respiratory: Positive for cough and wheezing. Negative for chest tightness and shortness of breath.    Cardiovascular: Negative for chest pain and palpitations.   Gastrointestinal: Negative for abdominal pain and blood in stool.   Endocrine: Negative.    Genitourinary: Negative for difficulty urinating and hematuria.   Musculoskeletal: Negative for joint swelling.   Skin: Negative for color change and rash.   Allergic/Immunologic: Negative.    Neurological: Positive for headaches. Negative for syncope and speech difficulty.   Hematological: Negative for adenopathy.   Psychiatric/Behavioral: Negative for agitation and confusion.   All other systems reviewed and are negative.      Objective   Physical  Exam   Constitutional: She is oriented to person, place, and time. She appears well-developed and well-nourished. No distress.   HENT:   Head: Normocephalic and atraumatic.   Right Ear: External ear normal.   Left Ear: External ear normal.   Mouth/Throat: Oropharynx is clear and moist. No oropharyngeal exudate.   Ear fluid bilat  pnd   Eyes: Conjunctivae and EOM are normal. Pupils are equal, round, and reactive to light. Right eye exhibits no discharge. Left eye exhibits no discharge. No scleral icterus.   Neck: Normal range of motion. Neck supple.   Cardiovascular: Normal rate, regular rhythm, normal heart sounds, intact distal pulses and normal pulses. Exam reveals no gallop.   No murmur heard.  I did not hear carotid bruits; decreased pedal pulses   Pulmonary/Chest: Effort normal and breath sounds normal. No respiratory distress. She has no wheezes. She has no rales.   Abdominal: Tenderness: epigastric.   Musculoskeletal: Normal range of motion. She exhibits no tenderness.   Lymphadenopathy:     She has no cervical adenopathy.   Neurological: She is alert and oriented to person, place, and time. She exhibits normal muscle tone. Coordination normal.   Skin: Skin is warm. No rash noted. No erythema. No pallor.   Psychiatric: Her behavior is normal. Judgment and thought content normal.   Nursing note and vitals reviewed.      Assessment/Plan   Nelsy was seen today for anxiety.    Diagnoses and all orders for this visit:    Mixed hyperlipidemia    Situational anxiety    Gastroesophageal reflux disease without esophagitis    Dysphoric mood    Left carotid artery stenosis    Essential hypertension        Cont. Lexapro for anxiety and working  See vascular left side neck pain; hx stent, smoker  Cough; suspect allergies but see DR Hernandez for work up; just had CXR and low dose CT chest  Stay on Plavix  Plan, Nelsy Stokes, was seen today.  she was seen for HTN and continue medication, Imparied fasting glucose and plan  follow up labs, diet, and exercise, GERD and will continue on PPI medication, Hyperlipidemia and will continue current medication and Vitamin D deficiency and supplemented.  Go up on PPI to BID to see if helps cough;  Add Flonase and ? Help cough and h/a

## 2020-01-30 RX ORDER — VALSARTAN 160 MG/1
TABLET ORAL
Qty: 90 TABLET | Refills: 0 | Status: SHIPPED | OUTPATIENT
Start: 2020-01-30 | End: 2020-02-05 | Stop reason: SDUPTHER

## 2020-01-31 ENCOUNTER — TELEPHONE (OUTPATIENT)
Dept: FAMILY MEDICINE CLINIC | Facility: CLINIC | Age: 65
End: 2020-01-31

## 2020-02-01 ENCOUNTER — RESULTS ENCOUNTER (OUTPATIENT)
Dept: FAMILY MEDICINE CLINIC | Facility: CLINIC | Age: 65
End: 2020-02-01

## 2020-02-01 DIAGNOSIS — R73.01 IMPAIRED FASTING GLUCOSE: ICD-10-CM

## 2020-02-01 DIAGNOSIS — R05.9 COUGH: ICD-10-CM

## 2020-02-01 DIAGNOSIS — F41.8 SITUATIONAL ANXIETY: ICD-10-CM

## 2020-02-01 DIAGNOSIS — K21.9 GASTROESOPHAGEAL REFLUX DISEASE WITHOUT ESOPHAGITIS: ICD-10-CM

## 2020-02-01 DIAGNOSIS — I10 ESSENTIAL HYPERTENSION: ICD-10-CM

## 2020-02-01 DIAGNOSIS — I65.22 LEFT CAROTID ARTERY STENOSIS: ICD-10-CM

## 2020-02-01 DIAGNOSIS — R45.89 DYSPHORIC MOOD: ICD-10-CM

## 2020-02-01 DIAGNOSIS — E78.2 MIXED HYPERLIPIDEMIA: ICD-10-CM

## 2020-02-05 ENCOUNTER — OFFICE VISIT (OUTPATIENT)
Dept: FAMILY MEDICINE CLINIC | Facility: CLINIC | Age: 65
End: 2020-02-05

## 2020-02-05 VITALS
DIASTOLIC BLOOD PRESSURE: 80 MMHG | SYSTOLIC BLOOD PRESSURE: 144 MMHG | RESPIRATION RATE: 16 BRPM | BODY MASS INDEX: 24.25 KG/M2 | TEMPERATURE: 98.4 F | WEIGHT: 160 LBS | HEART RATE: 83 BPM | OXYGEN SATURATION: 96 % | HEIGHT: 68 IN

## 2020-02-05 DIAGNOSIS — I10 ESSENTIAL HYPERTENSION: Primary | ICD-10-CM

## 2020-02-05 DIAGNOSIS — Z72.0 TOBACCO ABUSE: ICD-10-CM

## 2020-02-05 PROCEDURE — 99213 OFFICE O/P EST LOW 20 MIN: CPT | Performed by: PHYSICIAN ASSISTANT

## 2020-02-05 RX ORDER — VALSARTAN 160 MG/1
320 TABLET ORAL DAILY
Qty: 90 TABLET | Refills: 0
Start: 2020-02-05 | End: 2020-02-12

## 2020-02-05 NOTE — PROGRESS NOTES
"Subjective   Nelsy Stokes is a 64 y.o. female.     History of Present Illness   Nelsy Stokes 64 y.o. female /80   Pulse 83   Temp 98.4 °F (36.9 °C) (Oral)   Resp 16   Ht 172.7 cm (67.99\")   Wt 72.6 kg (160 lb)   LMP  (LMP Unknown)   SpO2 96%   BMI 24.34 kg/m²  who presents today for elevated bp and on Valsartan. Home readings staying high.  she has a history of   Patient Active Problem List   Diagnosis   • Asymptomatic microscopic hematuria   • Subclavian arterial stenosis (CMS/HCC)   • PAD (peripheral artery disease) (CMS/HCC)   • Bilateral carotid artery disease (CMS/HCC)   • Mixed hyperlipidemia   • Osteopenia of multiple sites   • Aortic atherosclerosis (CMS/HCC)   • Left carotid artery stenosis   • Essential hypertension   • Situational anxiety   • Gastroesophageal reflux disease without esophagitis   • Dysphoric mood   • Impaired fasting glucose   • Bradycardia   • Chest pain   • Claudication (CMS/HCC)   • Palpitations   • Tobacco abuse   .still has some cough and SOA---some better; neg CT low dose chest Oct; to see DR Hernandez  Working on smoking  Home reading with machine 151-180/  bp up for at least 2 weeks        The following portions of the patient's history were reviewed and updated as appropriate: allergies, current medications, past family history, past medical history, past social history, past surgical history and problem list.    Review of Systems   Constitutional: Negative for activity change, appetite change and unexpected weight change.   HENT: Negative for nosebleeds and trouble swallowing.    Eyes: Negative for pain and visual disturbance.   Respiratory: Positive for cough and shortness of breath. Negative for chest tightness and wheezing.    Cardiovascular: Negative for chest pain and palpitations.   Gastrointestinal: Negative for abdominal pain and blood in stool.   Endocrine: Negative.    Genitourinary: Negative for difficulty urinating and hematuria.   Musculoskeletal: " Negative for joint swelling.   Skin: Negative for color change and rash.   Allergic/Immunologic: Negative.    Neurological: Positive for headaches. Negative for syncope and speech difficulty.   Hematological: Negative for adenopathy.   Psychiatric/Behavioral: Negative for agitation and confusion.   All other systems reviewed and are negative.      Objective   Physical Exam   Constitutional: She is oriented to person, place, and time. She appears well-developed and well-nourished. No distress.   HENT:   Head: Normocephalic and atraumatic.   Eyes: Pupils are equal, round, and reactive to light. Conjunctivae and EOM are normal. Right eye exhibits no discharge. Left eye exhibits no discharge. No scleral icterus.   Neck: Normal range of motion. Neck supple. No tracheal deviation present. No thyromegaly present.   Cardiovascular: Normal rate, regular rhythm, normal heart sounds and normal pulses. Exam reveals no gallop.   No murmur heard.  Decreased pedal pulses, no edema   Pulmonary/Chest: Effort normal and breath sounds normal. No respiratory distress. She has no wheezes. She has no rales.   Musculoskeletal: Normal range of motion.   Neurological: She is alert and oriented to person, place, and time. She exhibits normal muscle tone. Coordination normal.   Skin: Skin is warm. No rash noted. No erythema. No pallor.   Psychiatric: She has a normal mood and affect. Her behavior is normal. Judgment and thought content normal.   Nursing note and vitals reviewed.      Assessment/Plan   Problems Addressed this Visit        Cardiovascular and Mediastinum    Essential hypertension - Primary       Other    Tobacco abuse        Go up on Valsartan 320 mg, she can take 2 of the 160 and see me back in a week; I will have her bring her machine with her to test against our manual readings so we can make sure her blood pressures controlled  We will get labs  Has appointment to follow-up with vascular surgeon  To see allergy and asthma  specialist

## 2020-02-07 DIAGNOSIS — E78.2 MIXED HYPERLIPIDEMIA: ICD-10-CM

## 2020-02-07 DIAGNOSIS — I10 ESSENTIAL HYPERTENSION: Primary | ICD-10-CM

## 2020-02-07 DIAGNOSIS — Z72.0 TOBACCO ABUSE: ICD-10-CM

## 2020-02-07 DIAGNOSIS — E53.8 LOW FOLIC ACID: ICD-10-CM

## 2020-02-07 DIAGNOSIS — I65.23 BILATERAL CAROTID ARTERY STENOSIS: ICD-10-CM

## 2020-02-07 LAB
ALBUMIN SERPL-MCNC: 4.7 G/DL (ref 3.5–5.2)
ALBUMIN/GLOB SERPL: 2.2 G/DL
ALP SERPL-CCNC: 78 U/L (ref 39–117)
ALT SERPL-CCNC: 19 U/L (ref 1–33)
AST SERPL-CCNC: 15 U/L (ref 1–32)
BASOPHILS # BLD AUTO: 0.06 10*3/MM3 (ref 0–0.2)
BASOPHILS NFR BLD AUTO: 0.9 % (ref 0–1.5)
BILIRUB SERPL-MCNC: 0.6 MG/DL (ref 0.2–1.2)
BUN SERPL-MCNC: 12 MG/DL (ref 8–23)
BUN/CREAT SERPL: 14.6 (ref 7–25)
CALCIUM SERPL-MCNC: 9.7 MG/DL (ref 8.6–10.5)
CHLORIDE SERPL-SCNC: 99 MMOL/L (ref 98–107)
CHOLEST SERPL-MCNC: 219 MG/DL (ref 0–200)
CO2 SERPL-SCNC: 25.6 MMOL/L (ref 22–29)
CREAT SERPL-MCNC: 0.82 MG/DL (ref 0.57–1)
EOSINOPHIL # BLD AUTO: 0.1 10*3/MM3 (ref 0–0.4)
EOSINOPHIL NFR BLD AUTO: 1.5 % (ref 0.3–6.2)
ERYTHROCYTE [DISTWIDTH] IN BLOOD BY AUTOMATED COUNT: 12.3 % (ref 12.3–15.4)
FOLATE SERPL-MCNC: 6.59 NG/ML (ref 4.78–24.2)
GLOBULIN SER CALC-MCNC: 2.1 GM/DL
GLUCOSE SERPL-MCNC: 99 MG/DL (ref 65–99)
HBA1C MFR BLD: 6.4 % (ref 4.8–5.6)
HCT VFR BLD AUTO: 46.6 % (ref 34–46.6)
HDLC SERPL-MCNC: 57 MG/DL (ref 40–60)
HGB BLD-MCNC: 16.2 G/DL (ref 12–15.9)
IMM GRANULOCYTES # BLD AUTO: 0.01 10*3/MM3 (ref 0–0.05)
IMM GRANULOCYTES NFR BLD AUTO: 0.2 % (ref 0–0.5)
LDLC SERPL CALC-MCNC: 146 MG/DL (ref 0–100)
LYMPHOCYTES # BLD AUTO: 2.17 10*3/MM3 (ref 0.7–3.1)
LYMPHOCYTES NFR BLD AUTO: 33.4 % (ref 19.6–45.3)
MCH RBC QN AUTO: 31.4 PG (ref 26.6–33)
MCHC RBC AUTO-ENTMCNC: 34.8 G/DL (ref 31.5–35.7)
MCV RBC AUTO: 90.3 FL (ref 79–97)
MONOCYTES # BLD AUTO: 0.6 10*3/MM3 (ref 0.1–0.9)
MONOCYTES NFR BLD AUTO: 9.2 % (ref 5–12)
NEUTROPHILS # BLD AUTO: 3.55 10*3/MM3 (ref 1.7–7)
NEUTROPHILS NFR BLD AUTO: 54.8 % (ref 42.7–76)
NRBC BLD AUTO-RTO: 0 /100 WBC (ref 0–0.2)
PLATELET # BLD AUTO: 304 10*3/MM3 (ref 140–450)
POTASSIUM SERPL-SCNC: 5.2 MMOL/L (ref 3.5–5.2)
PROT SERPL-MCNC: 6.8 G/DL (ref 6–8.5)
RBC # BLD AUTO: 5.16 10*6/MM3 (ref 3.77–5.28)
SODIUM SERPL-SCNC: 139 MMOL/L (ref 136–145)
T3FREE SERPL-MCNC: 3.2 PG/ML (ref 2–4.4)
T4 FREE SERPL-MCNC: 1.12 NG/DL (ref 0.93–1.7)
TRIGL SERPL-MCNC: 82 MG/DL (ref 0–150)
TSH SERPL DL<=0.005 MIU/L-ACNC: 2.76 UIU/ML (ref 0.27–4.2)
VIT B12 SERPL-MCNC: 469 PG/ML (ref 211–946)
VLDLC SERPL CALC-MCNC: 16.4 MG/DL (ref 5–40)
WBC # BLD AUTO: 6.49 10*3/MM3 (ref 3.4–10.8)

## 2020-02-11 ENCOUNTER — OFFICE VISIT (OUTPATIENT)
Dept: CARDIOLOGY | Facility: CLINIC | Age: 65
End: 2020-02-11

## 2020-02-11 VITALS
HEIGHT: 68 IN | SYSTOLIC BLOOD PRESSURE: 120 MMHG | WEIGHT: 159 LBS | DIASTOLIC BLOOD PRESSURE: 84 MMHG | BODY MASS INDEX: 24.1 KG/M2 | HEART RATE: 87 BPM

## 2020-02-11 DIAGNOSIS — R06.02 SOB (SHORTNESS OF BREATH): ICD-10-CM

## 2020-02-11 DIAGNOSIS — Z72.0 TOBACCO ABUSE: ICD-10-CM

## 2020-02-11 DIAGNOSIS — R07.2 PRECORDIAL PAIN: Primary | ICD-10-CM

## 2020-02-11 DIAGNOSIS — I73.9 PAD (PERIPHERAL ARTERY DISEASE) (HCC): Chronic | ICD-10-CM

## 2020-02-11 DIAGNOSIS — I70.0 AORTIC ATHEROSCLEROSIS (HCC): Chronic | ICD-10-CM

## 2020-02-11 DIAGNOSIS — I65.23 BILATERAL CAROTID ARTERY STENOSIS: Chronic | ICD-10-CM

## 2020-02-11 PROCEDURE — 99214 OFFICE O/P EST MOD 30 MIN: CPT | Performed by: INTERNAL MEDICINE

## 2020-02-11 PROCEDURE — 93000 ELECTROCARDIOGRAM COMPLETE: CPT | Performed by: INTERNAL MEDICINE

## 2020-02-11 NOTE — PROGRESS NOTES
Subjective:     Encounter Date: 02/11/20        Patient ID: Nelsy Stokes is a 64 y.o. female.    Chief Complaint: SOB  History of Present Illness    Dear Dr. Leong,    I had the pleasure of seeing this patient in the office today for f/u.  We saw her in the past for preoperative assessment prior to carotid endarterectomy.  She comes in now with complaints of CP and  shortness of breath.    Lately she has been having recurrent episodes of mid precordial chest discomfort.  She is also been getting short of breath more easily.  She does smoke heavily, although she is trying to quit.  She thinks that they recently diagnosed her with COPD.  She has been having an ongoing cough.  Cough is nonproductive.  She has not any palpitations or tachycardia.  No lower extremity edema.  She denies any orthopnea or PND.  She has been having diminished energy levels.    She is not known to have any prior coronary artery disease.  She's been found to have bilateral cerebrovascular disease, subclavian arterial disease, and abdominal aortic moderate to severe atherosclerotic disease.      This patient has no known cardiac history.  This patient has no history of coronary artery disease, congestive heart failure, rheumatic fever, rheumatic heart disease, congenital heart disease or heart murmur.  This patient has never required invasive cardiovascular evaluation.    The following portions of the patient's history were reviewed and updated as appropriate: allergies, current medications, past family history, past medical history, past social history, past surgical history and problem list.    Past Medical History:   Diagnosis Date   • Acid reflux    • Anxiety    • Aortic atherosclerosis (CMS/HCC) 10/23/2018   • Bradycardia    • Carotid artery disease (CMS/HCC)    • Carotid artery stenosis    • Chest pain     NORMAL STRESS TEST 10/23/18   • Claudication (CMS/HCC)    • History of colon polyps    • Hyperlipidemia    • Kidney stone on left  side    • Osteopenia    • Osteoporosis    • Palpitations    • PVD (peripheral vascular disease) (CMS/HCC)    • Subclavian artery stenosis (CMS/HCC)    • Tobacco abuse        Past Surgical History:   Procedure Laterality Date   • KNEE CARTILAGE SURGERY Right        Social History     Socioeconomic History   • Marital status:      Spouse name: Not on file   • Number of children: Not on file   • Years of education: Not on file   • Highest education level: Not on file   Tobacco Use   • Smoking status: Current Every Day Smoker     Packs/day: 1.00     Years: 45.00     Pack years: 45.00     Types: Cigarettes   • Smokeless tobacco: Never Used   • Tobacco comment: caff use   Substance and Sexual Activity   • Alcohol use: Yes     Comment: SOCIALLY   • Drug use: No   • Sexual activity: Yes     Partners: Male     Comment:        Review of Systems   Constitution: Negative for chills, decreased appetite, fever and night sweats.   HENT: Negative for ear discharge, ear pain, hearing loss, nosebleeds and sore throat.    Eyes: Negative for blurred vision, double vision and pain.   Cardiovascular: Negative for cyanosis.   Respiratory: Negative for hemoptysis and sputum production.    Endocrine: Negative for cold intolerance and heat intolerance.   Hematologic/Lymphatic: Negative for adenopathy.   Skin: Negative for dry skin, itching, nail changes, rash and suspicious lesions.   Musculoskeletal: Negative for arthritis, gout, muscle cramps, muscle weakness, myalgias and neck pain.   Gastrointestinal: Negative for anorexia, bowel incontinence, constipation, diarrhea, dysphagia, hematemesis and jaundice.   Genitourinary: Negative for bladder incontinence, dysuria, flank pain, frequency, hematuria and nocturia.   Neurological: Negative for focal weakness, numbness, paresthesias and seizures.   Psychiatric/Behavioral: Negative for altered mental status, hallucinations, hypervigilance, suicidal ideas and thoughts of violence.  "  Allergic/Immunologic: Negative for persistent infections.         ECG 12 Lead  Date/Time: 2/11/2020 1:03 PM  Performed by: Julito Bueno III, MD  Authorized by: Julito Bueno III, MD   Comparison: compared with previous ECG   Similar to previous ECG  Rhythm: sinus rhythm  Rate: normal  Conduction: conduction normal  ST Segments: ST segments normal  T Waves: T waves normal  QRS axis: normal  Other: no other findings    Clinical impression: normal ECG               Objective:     Vitals:    02/11/20 1252   BP: 120/84   Pulse: 87   Weight: 72.1 kg (159 lb)   Height: 172.7 cm (68\")         Physical Exam   Constitutional: She is oriented to person, place, and time. She appears well-developed and well-nourished. No distress.   HENT:   Head: Normocephalic and atraumatic.   Nose: Nose normal.   Mouth/Throat: Oropharynx is clear and moist.   Eyes: Pupils are equal, round, and reactive to light. Conjunctivae and EOM are normal. Right eye exhibits no discharge. Left eye exhibits no discharge.   Neck: Normal range of motion. Neck supple. No tracheal deviation present. No thyromegaly present.   Cardiovascular: Normal rate, regular rhythm, S1 normal, S2 normal, normal heart sounds and normal pulses. Exam reveals no S3.   Pulmonary/Chest: Effort normal and breath sounds normal. No stridor. No respiratory distress. She exhibits no tenderness.   Abdominal: Soft. Bowel sounds are normal. She exhibits no distension and no mass. There is no tenderness. There is no rebound and no guarding.   Musculoskeletal: Normal range of motion. She exhibits no tenderness or deformity.   Lymphadenopathy:     She has no cervical adenopathy.   Neurological: She is alert and oriented to person, place, and time. She has normal reflexes.   Skin: Skin is warm and dry. No rash noted. She is not diaphoretic. No erythema.   Psychiatric: She has a normal mood and affect. Thought content normal.       Lab Review:   Results from last 7 days   Lab Units " 02/06/20  0815   SODIUM mmol/L 139   POTASSIUM mmol/L 5.2   CHLORIDE mmol/L 99   TOTAL CO2 mmol/L 25.6   BUN mg/dL 12   CREATININE mg/dL 0.82   CALCIUM mg/dL 9.7     Results from last 7 days   Lab Units 02/06/20  0815   TRIGLYCERIDES mg/dL 82   HDL CHOL mg/dL 57   LDL CHOL mg/dL 146*       Performed        Assessment:          Diagnosis Plan   1. Precordial pain  ECG 12 Lead    Adult Transthoracic Echo Complete W/ Cont if Necessary Per Protocol    Stress Test With Myocardial Perfusion One Day   2. SOB (shortness of breath)  ECG 12 Lead    Adult Transthoracic Echo Complete W/ Cont if Necessary Per Protocol    Stress Test With Myocardial Perfusion One Day   3. Aortic atherosclerosis (CMS/HCC)  ECG 12 Lead    Adult Transthoracic Echo Complete W/ Cont if Necessary Per Protocol    Stress Test With Myocardial Perfusion One Day   4. Bilateral carotid artery stenosis  ECG 12 Lead    Adult Transthoracic Echo Complete W/ Cont if Necessary Per Protocol    Stress Test With Myocardial Perfusion One Day   5. PAD (peripheral artery disease) (CMS/HCC)  ECG 12 Lead    Adult Transthoracic Echo Complete W/ Cont if Necessary Per Protocol    Stress Test With Myocardial Perfusion One Day   6. Tobacco abuse  Adult Transthoracic Echo Complete W/ Cont if Necessary Per Protocol    Stress Test With Myocardial Perfusion One Day          Plan:       1.  Chest pain and shortness of breath, multiple cardiac risk factors, unable to ambulate on the treadmill because of claudication due to her peripheral arterial disease, we will arrange for a Lexiscan Cardiolite.  Because of dyspnea will also check an echocardiogram.  2.  Tobacco abuse-smoking cessation is recommended  3.  Peripheral vascular disease-risk factor modification and medical therapy    Thank you very much for allowing us to participate in the care of this pleasant patient.  Please don't hesitate to call if I can be of assistance in any way.      Current Outpatient Medications:   •   albuterol sulfate  (90 Base) MCG/ACT inhaler, Inhale 2 puffs Every 4 (Four) Hours As Needed for Wheezing., Disp: 1 inhaler, Rfl: 11  •  aspirin 81 MG EC tablet, Take 81 mg by mouth Daily., Disp: , Rfl:   •  clopidogrel (PLAVIX) 75 MG tablet, Take 75 mg by mouth Daily., Disp: , Rfl:   •  ezetimibe (ZETIA) 10 MG tablet, Take 1 tablet by mouth Daily. For cholesterol, Disp: 90 tablet, Rfl: 1  •  pantoprazole (PROTONIX) 40 MG EC tablet, One PO BID---new dose For GERD, Disp: 180 tablet, Rfl: 1  •  valsartan (DIOVAN) 160 MG tablet, Take 2 tablets by mouth Daily. Going up on dose to 2 p.o. daily and will not send prescription to pharmacy due to she has this at home, Disp: 90 tablet, Rfl: 0         EMR Dragon/Transcription disclaimer:    Much of this encounter note is an electronic transcription/translation of spoken language to printed text. The electronic translation of spoken language may permit erroneous, or at times, nonsensical words or phrases to be inadvertently transcribed; Although I have reviewed the note for such errors, some may still exist.

## 2020-02-12 ENCOUNTER — OFFICE VISIT (OUTPATIENT)
Dept: FAMILY MEDICINE CLINIC | Facility: CLINIC | Age: 65
End: 2020-02-12

## 2020-02-12 VITALS
TEMPERATURE: 97.8 F | HEART RATE: 68 BPM | DIASTOLIC BLOOD PRESSURE: 62 MMHG | HEIGHT: 68 IN | WEIGHT: 159 LBS | OXYGEN SATURATION: 97 % | RESPIRATION RATE: 16 BRPM | SYSTOLIC BLOOD PRESSURE: 110 MMHG | BODY MASS INDEX: 24.1 KG/M2

## 2020-02-12 DIAGNOSIS — Z72.0 TOBACCO ABUSE: ICD-10-CM

## 2020-02-12 DIAGNOSIS — I10 ESSENTIAL HYPERTENSION: Primary | ICD-10-CM

## 2020-02-12 DIAGNOSIS — I65.23 BILATERAL CAROTID ARTERY STENOSIS: ICD-10-CM

## 2020-02-12 DIAGNOSIS — Z20.828 EXPOSURE TO THE FLU: ICD-10-CM

## 2020-02-12 PROCEDURE — 99213 OFFICE O/P EST LOW 20 MIN: CPT | Performed by: PHYSICIAN ASSISTANT

## 2020-02-12 RX ORDER — OSELTAMIVIR PHOSPHATE 75 MG/1
75 CAPSULE ORAL DAILY
Qty: 10 CAPSULE | Refills: 0 | Status: SHIPPED | OUTPATIENT
Start: 2020-02-12 | End: 2020-03-04

## 2020-02-12 RX ORDER — VALSARTAN 320 MG/1
320 TABLET ORAL DAILY
Qty: 90 TABLET | Refills: 1 | Status: SHIPPED | OUTPATIENT
Start: 2020-02-12 | End: 2020-07-20 | Stop reason: SDUPTHER

## 2020-02-12 RX ORDER — TIOTROPIUM BROMIDE INHALATION SPRAY 3.12 UG/1
2 SPRAY, METERED RESPIRATORY (INHALATION)
COMMUNITY
Start: 2020-02-10 | End: 2021-10-08

## 2020-02-12 NOTE — PROGRESS NOTES
"Subjective   Nelsy Stokes is a 64 y.o. female.     History of Present Illness    Since the last visit, she has overall felt well.  She has Essential hypertension here today to follow-up from last visit with raising her valsartan dose.  Blood pressure is now nicely controlled and she is not having any dizziness.  I do plan on continuing the same dose.  I do have labs ordered for May.  She does see the vascular surgeon. .  she has been compliant with current medications have reviewed them.  The patient denies medication side effects.  Will refill medications. /62 (BP Location: Right arm, Patient Position: Sitting, Cuff Size: Large Adult)   Pulse 68   Temp 97.8 °F (36.6 °C) (Oral)   Resp 16   Ht 172.7 cm (68\")   Wt 72.1 kg (159 lb)   LMP  (LMP Unknown)   SpO2 97%   BMI 24.18 kg/m²     Results for orders placed or performed in visit on 02/01/20   Comprehensive metabolic panel   Result Value Ref Range    Glucose 99 65 - 99 mg/dL    BUN 12 8 - 23 mg/dL    Creatinine 0.82 0.57 - 1.00 mg/dL    eGFR Non African Am 70 >60 mL/min/1.73    eGFR African Am 85 >60 mL/min/1.73    BUN/Creatinine Ratio 14.6 7.0 - 25.0    Sodium 139 136 - 145 mmol/L    Potassium 5.2 3.5 - 5.2 mmol/L    Chloride 99 98 - 107 mmol/L    Total CO2 25.6 22.0 - 29.0 mmol/L    Calcium 9.7 8.6 - 10.5 mg/dL    Total Protein 6.8 6.0 - 8.5 g/dL    Albumin 4.70 3.50 - 5.20 g/dL    Globulin 2.1 gm/dL    A/G Ratio 2.2 g/dL    Total Bilirubin 0.6 0.2 - 1.2 mg/dL    Alkaline Phosphatase 78 39 - 117 U/L    AST (SGOT) 15 1 - 32 U/L    ALT (SGPT) 19 1 - 33 U/L   Lipid panel   Result Value Ref Range    Total Cholesterol 219 (H) 0 - 200 mg/dL    Triglycerides 82 0 - 150 mg/dL    HDL Cholesterol 57 40 - 60 mg/dL    VLDL Cholesterol 16.4 5 - 40 mg/dL    LDL Cholesterol  146 (H) 0 - 100 mg/dL   TSH   Result Value Ref Range    TSH 2.760 0.270 - 4.200 uIU/mL   T3, Free   Result Value Ref Range    T3, Free 3.2 2.0 - 4.4 pg/mL   T4, Free   Result Value Ref Range "    Free T4 1.12 0.93 - 1.70 ng/dL   Vitamin B12   Result Value Ref Range    Vitamin B-12 469 211 - 946 pg/mL   Folate   Result Value Ref Range    Folate 6.59 4.78 - 24.20 ng/mL   Hemoglobin A1c   Result Value Ref Range    Hemoglobin A1C 6.40 (H) 4.80 - 5.60 %   CBC and Differential   Result Value Ref Range    WBC 6.49 3.40 - 10.80 10*3/mm3    RBC 5.16 3.77 - 5.28 10*6/mm3    Hemoglobin 16.2 (H) 12.0 - 15.9 g/dL    Hematocrit 46.6 34.0 - 46.6 %    MCV 90.3 79.0 - 97.0 fL    MCH 31.4 26.6 - 33.0 pg    MCHC 34.8 31.5 - 35.7 g/dL    RDW 12.3 12.3 - 15.4 %    Platelets 304 140 - 450 10*3/mm3    Neutrophil Rel % 54.8 42.7 - 76.0 %    Lymphocyte Rel % 33.4 19.6 - 45.3 %    Monocyte Rel % 9.2 5.0 - 12.0 %    Eosinophil Rel % 1.5 0.3 - 6.2 %    Basophil Rel % 0.9 0.0 - 1.5 %    Neutrophils Absolute 3.55 1.70 - 7.00 10*3/mm3    Lymphocytes Absolute 2.17 0.70 - 3.10 10*3/mm3    Monocytes Absolute 0.60 0.10 - 0.90 10*3/mm3    Eosinophils Absolute 0.10 0.00 - 0.40 10*3/mm3    Basophils Absolute 0.06 0.00 - 0.20 10*3/mm3    Immature Granulocyte Rel % 0.2 0.0 - 0.5 %    Immature Grans Absolute 0.01 0.00 - 0.05 10*3/mm3    nRBC 0.0 0.0 - 0.2 /100 WBC     I do want a note that she did see asthma allergy specialist Dr. Hernandez last Monday.  He did pulmonary function testing due to my concern of her having continued cough.  He put her on 2 inhalers I see that she is on Spiriva which means that this would be COPD.  I do want to get his report and will make sure I read over everything and I do want to make sure she did not have asthma.  Since she has been on the inhalers her cough is better  I also know she saw the cardiologist yesterday.  He is doing additional work-up due to concerns about her recent mid precordial chest discomfort and more easily short of breath.  He has ordered a stress echo    Exp flu yesterday --B  Est CC 77  The following portions of the patient's history were reviewed and updated as appropriate: allergies, current  medications, past family history, past medical history, past social history, past surgical history and problem list.    Review of Systems   Constitutional: Negative for activity change, appetite change and unexpected weight change.   HENT: Negative for nosebleeds and trouble swallowing.    Eyes: Negative for pain and visual disturbance.   Respiratory: Positive for cough. Negative for chest tightness, shortness of breath and wheezing.    Cardiovascular: Negative for chest pain and palpitations.   Gastrointestinal: Negative for abdominal pain and blood in stool.   Endocrine: Negative.    Genitourinary: Negative for difficulty urinating and hematuria.   Musculoskeletal: Negative for joint swelling.   Skin: Negative for color change and rash.   Allergic/Immunologic: Negative.    Neurological: Negative for syncope, speech difficulty and headaches.   Hematological: Negative for adenopathy.   Psychiatric/Behavioral: Negative for agitation and confusion.   All other systems reviewed and are negative.      Objective   Physical Exam   Constitutional: She is oriented to person, place, and time. She appears well-developed and well-nourished. No distress.   HENT:   Head: Normocephalic and atraumatic.   Eyes: Pupils are equal, round, and reactive to light. Conjunctivae and EOM are normal. Right eye exhibits no discharge. Left eye exhibits no discharge. No scleral icterus.   Neck: Normal range of motion. Neck supple. No tracheal deviation present. No thyromegaly present.   Cardiovascular: Normal rate, regular rhythm, normal heart sounds and normal pulses. Exam reveals no gallop.   No murmur heard.  Decreased pedal pulses, no edema   Pulmonary/Chest: Effort normal and breath sounds normal. No respiratory distress. She has no wheezes. She has no rales.   Musculoskeletal: Normal range of motion.   Neurological: She is alert and oriented to person, place, and time. She exhibits normal muscle tone. Coordination normal.   Skin: Skin is  warm. No rash noted. No erythema. No pallor.   Psychiatric: She has a normal mood and affect. Her behavior is normal. Judgment and thought content normal.   Nursing note and vitals reviewed.      Assessment/Plan   Problems Addressed this Visit        Cardiovascular and Mediastinum    Bilateral carotid artery disease (CMS/HCC) (Chronic)    Essential hypertension - Primary    Relevant Medications    valsartan (DIOVAN) 320 MG tablet       Other    Tobacco abuse (Chronic)      Other Visit Diagnoses     Exposure to the flu            Will continue the new dose of valsartan at 320 mg for essential hypertension  Labs due in May  We will review the notes from Dr. Hernandez as soon as I can get them  She has appointment with cardiology to do a stress echo  Will give Tamiflu prevention she was exposed to flu yesterday

## 2020-02-13 ENCOUNTER — HOSPITAL ENCOUNTER (EMERGENCY)
Facility: HOSPITAL | Age: 65
Discharge: HOME | End: 2020-02-13
Attending: EMERGENCY MEDICINE | Admitting: EMERGENCY MEDICINE

## 2020-02-13 ENCOUNTER — APPOINTMENT (OUTPATIENT)
Dept: CT IMAGING | Facility: HOSPITAL | Age: 65
End: 2020-02-13

## 2020-02-13 VITALS
WEIGHT: 152.6 LBS | TEMPERATURE: 97.1 F | OXYGEN SATURATION: 98 % | BODY MASS INDEX: 23.13 KG/M2 | HEIGHT: 68 IN | RESPIRATION RATE: 18 BRPM | HEART RATE: 94 BPM | SYSTOLIC BLOOD PRESSURE: 175 MMHG | DIASTOLIC BLOOD PRESSURE: 100 MMHG

## 2020-02-13 DIAGNOSIS — S00.03XA CONTUSION OF SCALP, INITIAL ENCOUNTER: Primary | ICD-10-CM

## 2020-02-13 PROCEDURE — 70450 CT HEAD/BRAIN W/O DYE: CPT

## 2020-02-13 PROCEDURE — 72125 CT NECK SPINE W/O DYE: CPT

## 2020-02-13 PROCEDURE — 99283 EMERGENCY DEPT VISIT LOW MDM: CPT

## 2020-02-13 RX ORDER — ACETAMINOPHEN 500 MG
1000 TABLET ORAL ONCE
Status: COMPLETED | OUTPATIENT
Start: 2020-02-13 | End: 2020-02-13

## 2020-02-13 RX ADMIN — ACETAMINOPHEN 1000 MG: 500 TABLET, FILM COATED ORAL at 15:42
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
## 2020-02-13 NOTE — DISCHARGE INSTRUCTIONS
Review of Systems        ED Triage Vitals   Temp Heart Rate Resp BP SpO2   02/13/20 1227 02/13/20 1227 02/13/20 1227 02/13/20 1300 02/13/20 1227   97.1 °F (36.2 °C) 94 18 175/100 98 %      Temp src Heart Rate Source Patient Position BP Location FiO2 (%)   02/13/20 1227 -- 02/13/20 1300 -- --   Tympanic  Sitting       Return Precautions    Although you are being discharged from the ED today, I encourage you to return for worsening symptoms.  Things can, and do, change such that treatment at home with medication may not be adequate.      Specifically, return for any of the following:    Chest pain, shortness of breath, pain or nausea and vomiting not controlled by medications provided.    Please make a follow up with your Primary Care Provider for a blood pressure recheck.

## 2020-02-27 ENCOUNTER — HOSPITAL ENCOUNTER (OUTPATIENT)
Dept: CARDIOLOGY | Facility: HOSPITAL | Age: 65
Discharge: HOME | End: 2020-02-27

## 2020-02-27 ENCOUNTER — HOSPITAL ENCOUNTER (OUTPATIENT)
Dept: CARDIOLOGY | Facility: HOSPITAL | Age: 65
Discharge: HOME | End: 2020-02-27
Admitting: INTERNAL MEDICINE

## 2020-02-27 VITALS
OXYGEN SATURATION: 97 % | WEIGHT: 152 LBS | BODY MASS INDEX: 23.04 KG/M2 | SYSTOLIC BLOOD PRESSURE: 130 MMHG | HEIGHT: 68 IN | DIASTOLIC BLOOD PRESSURE: 82 MMHG | HEART RATE: 99 BPM

## 2020-02-27 DIAGNOSIS — Z72.0 TOBACCO ABUSE: ICD-10-CM

## 2020-02-27 DIAGNOSIS — I70.0 AORTIC ATHEROSCLEROSIS (HCC): ICD-10-CM

## 2020-02-27 DIAGNOSIS — I65.23 BILATERAL CAROTID ARTERY STENOSIS: ICD-10-CM

## 2020-02-27 DIAGNOSIS — R07.2 PRECORDIAL PAIN: ICD-10-CM

## 2020-02-27 DIAGNOSIS — R06.02 SOB (SHORTNESS OF BREATH): ICD-10-CM

## 2020-02-27 DIAGNOSIS — I73.9 PAD (PERIPHERAL ARTERY DISEASE) (HCC): ICD-10-CM

## 2020-02-27 LAB
AORTIC ARCH: 2.7 CM
AORTIC ROOT ANNULUS: 1.8 CM
ASCENDING AORTA: 3.2 CM
BH CV ECHO MEAS - ACS: 1.9 CM
BH CV ECHO MEAS - AO MAX PG (FULL): 4.3 MMHG
BH CV ECHO MEAS - AO MAX PG: 7.6 MMHG
BH CV ECHO MEAS - AO MEAN PG (FULL): 2.5 MMHG
BH CV ECHO MEAS - AO MEAN PG: 4.7 MMHG
BH CV ECHO MEAS - AO ROOT AREA (BSA CORRECTED): 1.6
BH CV ECHO MEAS - AO ROOT AREA: 6.7 CM^2
BH CV ECHO MEAS - AO ROOT DIAM: 2.9 CM
BH CV ECHO MEAS - AO V2 MAX: 137.5 CM/SEC
BH CV ECHO MEAS - AO V2 MEAN: 104.9 CM/SEC
BH CV ECHO MEAS - AO V2 VTI: 33.5 CM
BH CV ECHO MEAS - ASC AORTA: 3.2 CM
BH CV ECHO MEAS - AVA(I,A): 2.1 CM^2
BH CV ECHO MEAS - AVA(I,D): 2.1 CM^2
BH CV ECHO MEAS - AVA(V,A): 2 CM^2
BH CV ECHO MEAS - AVA(V,D): 2 CM^2
BH CV ECHO MEAS - BSA(HAYCOCK): 1.8 M^2
BH CV ECHO MEAS - BSA: 1.8 M^2
BH CV ECHO MEAS - BZI_BMI: 23.1 KILOGRAMS/M^2
BH CV ECHO MEAS - BZI_METRIC_HEIGHT: 172.7 CM
BH CV ECHO MEAS - BZI_METRIC_WEIGHT: 68.9 KG
BH CV ECHO MEAS - EDV(MOD-SP2): 43 ML
BH CV ECHO MEAS - EDV(MOD-SP4): 42 ML
BH CV ECHO MEAS - EDV(TEICH): 69.7 ML
BH CV ECHO MEAS - EF(CUBED): 72.7 %
BH CV ECHO MEAS - EF(MOD-BP): 62 %
BH CV ECHO MEAS - EF(MOD-SP2): 65.1 %
BH CV ECHO MEAS - EF(MOD-SP4): 57.1 %
BH CV ECHO MEAS - EF(TEICH): 65.1 %
BH CV ECHO MEAS - ESV(MOD-SP2): 15 ML
BH CV ECHO MEAS - ESV(MOD-SP4): 18 ML
BH CV ECHO MEAS - ESV(TEICH): 24.4 ML
BH CV ECHO MEAS - FS: 35.2 %
BH CV ECHO MEAS - IVS/LVPW: 1.1
BH CV ECHO MEAS - IVSD: 1.1 CM
BH CV ECHO MEAS - LAT PEAK E' VEL: 8 CM/SEC
BH CV ECHO MEAS - LV DIASTOLIC VOL/BSA (35-75): 23.1 ML/M^2
BH CV ECHO MEAS - LV MASS(C)D: 135.3 GRAMS
BH CV ECHO MEAS - LV MASS(C)DI: 74.4 GRAMS/M^2
BH CV ECHO MEAS - LV MAX PG: 3.2 MMHG
BH CV ECHO MEAS - LV MEAN PG: 2.2 MMHG
BH CV ECHO MEAS - LV SYSTOLIC VOL/BSA (12-30): 9.9 ML/M^2
BH CV ECHO MEAS - LV V1 MAX: 90 CM/SEC
BH CV ECHO MEAS - LV V1 MEAN: 70.8 CM/SEC
BH CV ECHO MEAS - LV V1 VTI: 23 CM
BH CV ECHO MEAS - LVIDD: 4 CM
BH CV ECHO MEAS - LVIDS: 2.6 CM
BH CV ECHO MEAS - LVLD AP2: 6.5 CM
BH CV ECHO MEAS - LVLD AP4: 6.5 CM
BH CV ECHO MEAS - LVLS AP2: 5.2 CM
BH CV ECHO MEAS - LVLS AP4: 5.4 CM
BH CV ECHO MEAS - LVOT AREA (M): 3.1 CM^2
BH CV ECHO MEAS - LVOT AREA: 3 CM^2
BH CV ECHO MEAS - LVOT DIAM: 2 CM
BH CV ECHO MEAS - LVPWD: 0.99 CM
BH CV ECHO MEAS - MED PEAK E' VEL: 7 CM/SEC
BH CV ECHO MEAS - MR MAX PG: 82.9 MMHG
BH CV ECHO MEAS - MR MAX VEL: 455.3 CM/SEC
BH CV ECHO MEAS - MV A DUR: 0.08 SEC
BH CV ECHO MEAS - MV A MAX VEL: 95.4 CM/SEC
BH CV ECHO MEAS - MV DEC SLOPE: 710.9 CM/SEC^2
BH CV ECHO MEAS - MV DEC TIME: 0.14 SEC
BH CV ECHO MEAS - MV E MAX VEL: 100 CM/SEC
BH CV ECHO MEAS - MV E/A: 1
BH CV ECHO MEAS - MV MAX PG: 7.1 MMHG
BH CV ECHO MEAS - MV MEAN PG: 2.3 MMHG
BH CV ECHO MEAS - MV P1/2T MAX VEL: 130.3 CM/SEC
BH CV ECHO MEAS - MV P1/2T: 53.7 MSEC
BH CV ECHO MEAS - MV V2 MAX: 133.4 CM/SEC
BH CV ECHO MEAS - MV V2 MEAN: 68.6 CM/SEC
BH CV ECHO MEAS - MV V2 VTI: 32.8 CM
BH CV ECHO MEAS - MVA P1/2T LCG: 1.7 CM^2
BH CV ECHO MEAS - MVA(P1/2T): 4.1 CM^2
BH CV ECHO MEAS - MVA(VTI): 2.1 CM^2
BH CV ECHO MEAS - PA ACC TIME: 0.08 SEC
BH CV ECHO MEAS - PA MAX PG (FULL): 0.84 MMHG
BH CV ECHO MEAS - PA MAX PG: 2.2 MMHG
BH CV ECHO MEAS - PA PR(ACCEL): 44.5 MMHG
BH CV ECHO MEAS - PA V2 MAX: 74.8 CM/SEC
BH CV ECHO MEAS - PULM A REVS DUR: 0.08 SEC
BH CV ECHO MEAS - PULM A REVS VEL: 42.8 CM/SEC
BH CV ECHO MEAS - PULM DIAS VEL: 62.1 CM/SEC
BH CV ECHO MEAS - PULM S/D: 1.2
BH CV ECHO MEAS - PULM SYS VEL: 72.8 CM/SEC
BH CV ECHO MEAS - PVA(V,A): 2.2 CM^2
BH CV ECHO MEAS - PVA(V,D): 2.2 CM^2
BH CV ECHO MEAS - QP/QS: 0.46
BH CV ECHO MEAS - RAP SYSTOLE: 3 MMHG
BH CV ECHO MEAS - RV MAX PG: 1.4 MMHG
BH CV ECHO MEAS - RV MEAN PG: 0.89 MMHG
BH CV ECHO MEAS - RV V1 MAX: 59.1 CM/SEC
BH CV ECHO MEAS - RV V1 MEAN: 45.1 CM/SEC
BH CV ECHO MEAS - RV V1 VTI: 11.4 CM
BH CV ECHO MEAS - RVOT AREA: 2.8 CM^2
BH CV ECHO MEAS - RVOT DIAM: 1.9 CM
BH CV ECHO MEAS - RVSP: 33.1 MMHG
BH CV ECHO MEAS - SI(AO): 123.3 ML/M^2
BH CV ECHO MEAS - SI(CUBED): 25.5 ML/M^2
BH CV ECHO MEAS - SI(LVOT): 38 ML/M^2
BH CV ECHO MEAS - SI(MOD-SP2): 15.4 ML/M^2
BH CV ECHO MEAS - SI(MOD-SP4): 13.2 ML/M^2
BH CV ECHO MEAS - SI(TEICH): 24.9 ML/M^2
BH CV ECHO MEAS - SUP REN AO DIAM: 2.4 CM
BH CV ECHO MEAS - SV(AO): 224.3 ML
BH CV ECHO MEAS - SV(CUBED): 46.3 ML
BH CV ECHO MEAS - SV(LVOT): 69.2 ML
BH CV ECHO MEAS - SV(MOD-SP2): 28 ML
BH CV ECHO MEAS - SV(MOD-SP4): 24 ML
BH CV ECHO MEAS - SV(RVOT): 32.1 ML
BH CV ECHO MEAS - SV(TEICH): 45.4 ML
BH CV ECHO MEAS - TAPSE (>1.6): 2.5 CM2
BH CV ECHO MEAS - TR MAX VEL: 274.3 CM/SEC
BH CV ECHO MEASUREMENTS AVERAGE E/E' RATIO: 13.33
BH CV NUCLEAR PRIOR STUDY: 3
BH CV STRESS BP STAGE 1: NORMAL
BH CV STRESS COMMENTS STAGE 1: NORMAL
BH CV STRESS DOSE REGADENOSON STAGE 1: 0.4
BH CV STRESS DURATION MIN STAGE 1: 0
BH CV STRESS DURATION SEC STAGE 1: 10
BH CV STRESS HR STAGE 1: 140
BH CV STRESS PROTOCOL 1: NORMAL
BH CV STRESS RECOVERY BP: NORMAL MMHG
BH CV STRESS RECOVERY HR: 98 BPM
BH CV STRESS STAGE 1: 1
BH CV VAS BP RIGHT ARM: NORMAL MMHG
BH CV XLRA - RV BASE: 2.4 CM
BH CV XLRA - RV LENGTH: 6.4 CM
BH CV XLRA - RV MID: 2.8 CM
BH CV XLRA - TDI S': 13 CM/SEC
LEFT ATRIUM VOLUME INDEX: 15 ML/M2
LV EF NUC BP: 75 %
MAXIMAL PREDICTED HEART RATE: 156 BPM
PERCENT MAX PREDICTED HR: 89.74 %
SINUS: 3.1 CM
STJ: 3 CM
STRESS BASELINE BP: NORMAL MMHG
STRESS BASELINE HR: 82 BPM
STRESS PERCENT HR: 106 %
STRESS POST EXERCISE DUR SEC: 10 SEC
STRESS POST PEAK BP: NORMAL MMHG
STRESS POST PEAK HR: 140 BPM
STRESS TARGET HR: 133 BPM

## 2020-02-27 PROCEDURE — 93306 TTE W/DOPPLER COMPLETE: CPT | Performed by: INTERNAL MEDICINE

## 2020-02-27 PROCEDURE — 78452 HT MUSCLE IMAGE SPECT MULT: CPT | Performed by: INTERNAL MEDICINE

## 2020-02-27 PROCEDURE — 93018 CV STRESS TEST I&R ONLY: CPT | Performed by: INTERNAL MEDICINE

## 2020-02-27 PROCEDURE — A9502 TC99M TETROFOSMIN: HCPCS | Performed by: INTERNAL MEDICINE

## 2020-02-27 PROCEDURE — 25010000002 REGADENOSON 0.4 MG/5ML SOLUTION: Performed by: INTERNAL MEDICINE

## 2020-02-27 PROCEDURE — 93016 CV STRESS TEST SUPVJ ONLY: CPT | Performed by: INTERNAL MEDICINE

## 2020-02-27 PROCEDURE — 93017 CV STRESS TEST TRACING ONLY: CPT

## 2020-02-27 PROCEDURE — 78452 HT MUSCLE IMAGE SPECT MULT: CPT

## 2020-02-27 PROCEDURE — 0 TECHNETIUM TETROFOSMIN KIT: Performed by: INTERNAL MEDICINE

## 2020-02-27 PROCEDURE — 93306 TTE W/DOPPLER COMPLETE: CPT

## 2020-02-27 RX ADMIN — TETROFOSMIN 1 DOSE: 1.38 INJECTION, POWDER, LYOPHILIZED, FOR SOLUTION INTRAVENOUS at 08:55

## 2020-02-27 RX ADMIN — TETROFOSMIN 1 DOSE: 1.38 INJECTION, POWDER, LYOPHILIZED, FOR SOLUTION INTRAVENOUS at 09:48

## 2020-02-27 RX ADMIN — REGADENOSON 0.4 MG: 0.08 INJECTION, SOLUTION INTRAVENOUS at 09:48

## 2020-03-03 ENCOUNTER — TELEPHONE (OUTPATIENT)
Dept: CARDIOLOGY | Facility: CLINIC | Age: 65
End: 2020-03-03

## 2020-03-03 NOTE — TELEPHONE ENCOUNTER
----- Message from Julito Bueno III, MD sent at 3/3/2020 12:51 PM EST -----  Let's make her an appt to be seen to review/discuss

## 2020-03-04 ENCOUNTER — OFFICE VISIT (OUTPATIENT)
Dept: CARDIOLOGY | Facility: CLINIC | Age: 65
End: 2020-03-04

## 2020-03-04 VITALS
BODY MASS INDEX: 24.43 KG/M2 | DIASTOLIC BLOOD PRESSURE: 70 MMHG | SYSTOLIC BLOOD PRESSURE: 134 MMHG | HEIGHT: 68 IN | HEART RATE: 82 BPM | WEIGHT: 161.2 LBS

## 2020-03-04 DIAGNOSIS — I10 ESSENTIAL HYPERTENSION: ICD-10-CM

## 2020-03-04 DIAGNOSIS — I73.9 PAD (PERIPHERAL ARTERY DISEASE) (HCC): Chronic | ICD-10-CM

## 2020-03-04 DIAGNOSIS — Z72.0 TOBACCO ABUSE: Chronic | ICD-10-CM

## 2020-03-04 DIAGNOSIS — R06.09 DYSPNEA ON EXERTION: ICD-10-CM

## 2020-03-04 DIAGNOSIS — E78.2 MIXED HYPERLIPIDEMIA: ICD-10-CM

## 2020-03-04 DIAGNOSIS — R07.2 PRECORDIAL PAIN: Primary | ICD-10-CM

## 2020-03-04 PROCEDURE — 99214 OFFICE O/P EST MOD 30 MIN: CPT | Performed by: NURSE PRACTITIONER

## 2020-03-04 NOTE — PROGRESS NOTES
Date of Office Visit: 2020  Encounter Provider: BENITO Douglas  Place of Service: [facility] CARDIOLOGY  Patient Name: Nelsy Stokes  :1955  Primary Cardiologist: Dr. Julito Bueno    Chief Complaint   Patient presents with   • Chest Pain   • Shortness of Breath   • Follow-up   :     Dear Dr. Gretchen King PA-C    HPI: Nelsy Stokes is a pleasant 64 y.o. female who presents today for discussion of cardiac test results. She is a new patient to me and her previous records have been reviewed.      She has been diagnosed with peripheral arterial disease (carotid artery stenosis, abdominal atherosclerotic disease, and subclavian artery stenosis), hyperlipidemia, and is a cigarette smoker. In 2018, she was having chest pain and had a normal treadmill stress test.    On 2020 she was evaluated by Dr. Julito Bueno.  She reported midsternal chest pain, shortness of breath, nonproductive cough, and decreased energy level.  Dr. Bueno recommended smoking sensation.  He arranged a Lexiscan Cardiolite stress test and echocardiogram.  Both tests were completed 2020 and the echocardiogram is as follows: EF 62%, aortic valve sclerosis without stenosis, trace mitral valve regurgitation, and trace to mild tricuspid valve regurgitation.  Nuclear stress test showed a very small amount of mild ischemia in the distal inferior wall, hyperdynamic EF greater than 70%, small left ventricle, and overall impression consistent with a low risk study.  Dr. Bueno recommended that she come to the office to discuss her test results.    She presents today for follow-up visit.  She informs me that she was evaluated by Dr. Hernandez for her shortness of breath and possibly diagnosed with COPD.  She was started on Advair and Spiriva and since then her chest pain and shortness of breath have both resolved.  She continues to have a residual mild cough which has improved as well.  She has  continued to smoke cigarettes daily.  She denies shortness of air, PND, orthopnea, edema, palpitations, dizziness, syncope, or bleeding.  Her blood pressure is stable today.  She is currently working closely with FREDDY Jerry for better blood pressure control.    Past Medical History:   Diagnosis Date   • Acid reflux    • Anxiety    • Aortic atherosclerosis (CMS/HCC) 10/23/2018   • Bradycardia    • Carotid artery disease (CMS/[facility])    • Carotid artery stenosis    • Chest pain     NORMAL STRESS TEST 10/23/18   • Claudication (CMS/HCC)    • History of colon polyps    • Hyperlipidemia    • Kidney stone on left side    • Osteopenia    • Osteoporosis    • Palpitations    • PVD (peripheral vascular disease) (CMS/[facility])    • Subclavian artery stenosis (CMS/[facility])    • Tobacco abuse        Past Surgical History:   Procedure Laterality Date   • KNEE CARTILAGE SURGERY Right        Social History     Socioeconomic History   • Marital status:      Spouse name: Not on file   • Number of children: Not on file   • Years of education: Not on file   • Highest education level: Not on file   Tobacco Use   • Smoking status: Current Every Day Smoker     Packs/day: 1.00     Years: 45.00     Pack years: 45.00     Types: Cigarettes   • Smokeless tobacco: Never Used   • Tobacco comment: caff use: 3 cups daily   Substance and Sexual Activity   • Alcohol use: Yes     Comment: SOCIALLY   • Drug use: No   • Sexual activity: Yes     Partners: Male     Comment:        Family History   Problem Relation Age of Onset   • Hypertension Father    • Lung disease Father    • Heart attack Father 62   • Anesthesia problems Father    • Heart failure Father 88   • Aneurysm Father         Abdominal Aortic Aneurysm   • Cancer Father    • COPD Father    • Atrial fibrillation Father    • Cancer Sister    • Heart disease Mother 83   • Hypertension Mother    • Atrial fibrillation Mother    • Malig Hyperthermia Neg Hx        The following portion of the  patient's history were reviewed and updated as appropriate: past medical history, past surgical history, past social history, past family history, allergies, current medications, and problem list.    Review of Systems   Constitution: Negative for chills, diaphoresis, fever, malaise/fatigue, night sweats, weight gain and weight loss.   HENT: Negative for hearing loss, nosebleeds, sore throat and tinnitus.    Eyes: Negative for blurred vision, double vision, pain and visual disturbance.   Cardiovascular: Negative for chest pain, claudication, cyanosis, dyspnea on exertion, irregular heartbeat, leg swelling, near-syncope, orthopnea, palpitations, paroxysmal nocturnal dyspnea and syncope.   Respiratory: Positive for cough. Negative for hemoptysis, shortness of breath, snoring and wheezing.    Endocrine: Negative for cold intolerance, heat intolerance and polyuria.   Hematologic/Lymphatic: Negative for bleeding problem. Does not bruise/bleed easily.   Skin: Negative for color change, dry skin, flushing and itching.   Musculoskeletal: Negative for falls, joint pain, joint swelling, muscle cramps, muscle weakness and myalgias.   Gastrointestinal: Negative for abdominal pain, constipation, heartburn, melena, nausea and vomiting.   Genitourinary: Negative for dysuria and hematuria.   Neurological: Negative for excessive daytime sleepiness, dizziness, light-headedness, loss of balance, numbness, paresthesias, seizures and vertigo.   Psychiatric/Behavioral: Negative for altered mental status, depression, memory loss and substance abuse. The patient does not have insomnia and is not nervous/anxious.    Allergic/Immunologic: Negative for environmental allergies.       Allergies   Allergen Reactions   • Pitavastatin Myalgia         Current Outpatient Medications:   •  albuterol sulfate  (90 Base) MCG/ACT inhaler, Inhale 2 puffs Every 4 (Four) Hours As Needed for Wheezing., Disp: 1 inhaler, Rfl: 11  •  aspirin 81 MG EC  "tablet, Take 81 mg by mouth Daily., Disp: , Rfl:   •  ezetimibe (ZETIA) 10 MG tablet, Take 1 tablet by mouth Daily. For cholesterol, Disp: 90 tablet, Rfl: 1  •  fluticasone-salmeterol (ADVAIR) 250-50 MCG/DOSE DISKUS, , Disp: , Rfl:   •  pantoprazole (PROTONIX) 40 MG EC tablet, One PO BID---new dose For GERD, Disp: 180 tablet, Rfl: 1  •  SPIRIVA RESPIMAT 2.5 MCG/ACT aerosol solution inhaler, , Disp: , Rfl:   •  valsartan (DIOVAN) 320 MG tablet, Take 1 tablet by mouth Daily. New dose for HTN, Disp: 90 tablet, Rfl: 1        Objective:     Vitals:    03/04/20 1232   BP: 134/70   BP Location: Right arm   Pulse: 82   Weight: 73.1 kg (161 lb 3.2 oz)   Height: 172.7 cm (68\")     Body mass index is 24.51 kg/m².    PHYSICAL EXAM:    Vitals Reviewed.   General Appearance: No acute distress, well developed and well nourished.   Eyes: Conjunctiva and lids: No erythema, swelling, or discharge. Sclera non-icteric.   HENT: Atraumatic, normocephalic. External eyes, ears, and nose normal. No hearing loss noted. Mucous membranes normal. Lips not cyanotic. Neck supple with no tenderness.  Respiratory: No signs of respiratory distress. Respiration rhythm and depth normal.   Clear to auscultation. No rales, crackles, rhonchi, or wheezing auscultated.   Cardiovascular:  Jugular Venous Pressure: Normal  Heart Rate and Rhythm: Normal, Heart Sounds: Normal S1 and S2. No S3 or S4 noted.  Murmurs: No murmurs noted. No rubs, thrills, or gallops.   Lower Extremities: No edema noted.  Gastrointestinal:  Abdomen soft, non-distended, non-tender. Normal bowel sounds. No hepatomegaly.   Musculoskeletal: Normal movement of extremities  Skin and Nails: General appearance normal. No pallor, cyanosis, diaphoresis. Skin temperature normal. No clubbing of fingernails.   Psychiatric: Patient alert and oriented to person, place, and time. Speech and behavior appropriate. Normal mood and affect.     Procedures     EKG not completed today.  I reviewed the EKG " from 2/11/2020 which showed normal sinus rhythm with Q waves V1 through V4.      Assessment:       Diagnosis Plan   1. Precordial pain     2. Dyspnea on exertion     3. PAD (peripheral artery disease) (CMS/HCC)     4. Essential hypertension     5. Mixed hyperlipidemia     6. Tobacco abuse            Plan:       1/2.  Chest Pain and Dyspnea: Improved with the start of Advair and Spiriva.      3.  Nuclear stress test showed a very small amount of mild ischemia in the distal inferior wall, hyperdynamic EF greater than 70%, small left ventricle, and overall impression consistent with a low risk study.  I discussed the test results with the patient.  She was concerned that her blood pressure went up with exercise and into recovery I told her that we will just continue to monitor this.  She is no longer having the chest pain or shortness of breath, I recommended no further treatment at this time.  If she continues to have chest pain, she needs to contact our office and we will potentially add beta-blocker therapy or proceed with coronary angiogram.  She does have many risk factors for coronary artery disease and will just continue to monitor.    4.  PAD: History of abdominal aorta atherosclerosis, carotid artery disease, and subclavian stenosis.  She is on aspirin.  I think she would really benefit from statin therapy and I will defer to her PCP.    5.  Hypertension: Blood pressure well controlled.  Currently working with FREDDY Jerry.    6.  Hyperlipidemia: She is on ezetimibe, but I think she would benefit from statin therapy.  Defer to PCP.    7.  Tobacco Use: I have highly recommend that she abstain from cigarette smoking.    8.  She will follow-up with Dr. Julito Bueno in 6 months, unless otherwise needed sooner.    As always, it has been a pleasure to participate in your patient's care. Thank you.       Sincerely,         BENITO Salgeuro        Dictated utilizing Dragon dictation

## 2020-05-01 ENCOUNTER — RESULTS ENCOUNTER (OUTPATIENT)
Dept: FAMILY MEDICINE CLINIC | Facility: CLINIC | Age: 65
End: 2020-05-01

## 2020-05-01 DIAGNOSIS — I65.23 BILATERAL CAROTID ARTERY STENOSIS: ICD-10-CM

## 2020-05-01 DIAGNOSIS — E53.8 LOW FOLIC ACID: ICD-10-CM

## 2020-05-01 DIAGNOSIS — E78.2 MIXED HYPERLIPIDEMIA: ICD-10-CM

## 2020-05-01 DIAGNOSIS — I10 ESSENTIAL HYPERTENSION: ICD-10-CM

## 2020-05-01 DIAGNOSIS — Z72.0 TOBACCO ABUSE: ICD-10-CM

## 2020-07-17 DIAGNOSIS — E53.8 LOW FOLIC ACID: Primary | ICD-10-CM

## 2020-07-17 LAB
ALBUMIN SERPL-MCNC: 4.7 G/DL (ref 3.5–5.2)
ALBUMIN/GLOB SERPL: 2.4 G/DL
ALP SERPL-CCNC: 81 U/L (ref 39–117)
ALT SERPL-CCNC: 21 U/L (ref 1–33)
AST SERPL-CCNC: 15 U/L (ref 1–32)
BASOPHILS # BLD AUTO: 0.07 10*3/MM3 (ref 0–0.2)
BASOPHILS NFR BLD AUTO: 0.8 % (ref 0–1.5)
BILIRUB SERPL-MCNC: 0.6 MG/DL (ref 0–1.2)
BUN SERPL-MCNC: 8 MG/DL (ref 8–23)
BUN/CREAT SERPL: 8.5 (ref 7–25)
CALCIUM SERPL-MCNC: 9.6 MG/DL (ref 8.6–10.5)
CHLORIDE SERPL-SCNC: 103 MMOL/L (ref 98–107)
CHOLEST SERPL-MCNC: 219 MG/DL (ref 0–200)
CO2 SERPL-SCNC: 29 MMOL/L (ref 22–29)
CREAT SERPL-MCNC: 0.94 MG/DL (ref 0.57–1)
EOSINOPHIL # BLD AUTO: 0.17 10*3/MM3 (ref 0–0.4)
EOSINOPHIL NFR BLD AUTO: 1.9 % (ref 0.3–6.2)
ERYTHROCYTE [DISTWIDTH] IN BLOOD BY AUTOMATED COUNT: 12.8 % (ref 12.3–15.4)
FOLATE SERPL-MCNC: 3.22 NG/ML (ref 4.78–24.2)
GLOBULIN SER CALC-MCNC: 2 GM/DL
GLUCOSE SERPL-MCNC: 93 MG/DL (ref 65–99)
HBA1C MFR BLD: 6.1 % (ref 4.8–5.6)
HCT VFR BLD AUTO: 46.2 % (ref 34–46.6)
HDLC SERPL-MCNC: 54 MG/DL (ref 40–60)
HGB BLD-MCNC: 15.9 G/DL (ref 12–15.9)
IMM GRANULOCYTES # BLD AUTO: 0.03 10*3/MM3 (ref 0–0.05)
IMM GRANULOCYTES NFR BLD AUTO: 0.3 % (ref 0–0.5)
LDLC SERPL CALC-MCNC: 146 MG/DL (ref 0–100)
LYMPHOCYTES # BLD AUTO: 2.75 10*3/MM3 (ref 0.7–3.1)
LYMPHOCYTES NFR BLD AUTO: 30 % (ref 19.6–45.3)
MCH RBC QN AUTO: 31.5 PG (ref 26.6–33)
MCHC RBC AUTO-ENTMCNC: 34.4 G/DL (ref 31.5–35.7)
MCV RBC AUTO: 91.5 FL (ref 79–97)
MONOCYTES # BLD AUTO: 0.68 10*3/MM3 (ref 0.1–0.9)
MONOCYTES NFR BLD AUTO: 7.4 % (ref 5–12)
NEUTROPHILS # BLD AUTO: 5.46 10*3/MM3 (ref 1.7–7)
NEUTROPHILS NFR BLD AUTO: 59.6 % (ref 42.7–76)
NRBC BLD AUTO-RTO: 0 /100 WBC (ref 0–0.2)
PLATELET # BLD AUTO: 330 10*3/MM3 (ref 140–450)
POTASSIUM SERPL-SCNC: 4.8 MMOL/L (ref 3.5–5.2)
PROT SERPL-MCNC: 6.7 G/DL (ref 6–8.5)
RBC # BLD AUTO: 5.05 10*6/MM3 (ref 3.77–5.28)
SODIUM SERPL-SCNC: 141 MMOL/L (ref 136–145)
TRIGL SERPL-MCNC: 94 MG/DL (ref 0–150)
VIT B12 SERPL-MCNC: 354 PG/ML (ref 211–946)
VLDLC SERPL CALC-MCNC: 18.8 MG/DL
WBC # BLD AUTO: 9.16 10*3/MM3 (ref 3.4–10.8)

## 2020-07-17 RX ORDER — MAGNESIUM 200 MG
TABLET ORAL
Qty: 90 EACH | Refills: 3 | Status: SHIPPED | OUTPATIENT
Start: 2020-07-17 | End: 2021-01-20 | Stop reason: SDUPTHER

## 2020-07-17 RX ORDER — FOLIC ACID 1 MG/1
1 TABLET ORAL DAILY
Qty: 90 TABLET | Refills: 1 | Status: SHIPPED | OUTPATIENT
Start: 2020-07-17 | End: 2021-01-20 | Stop reason: SDUPTHER

## 2020-07-20 ENCOUNTER — OFFICE VISIT (OUTPATIENT)
Dept: FAMILY MEDICINE CLINIC | Facility: CLINIC | Age: 65
End: 2020-07-20

## 2020-07-20 VITALS
TEMPERATURE: 97.6 F | DIASTOLIC BLOOD PRESSURE: 70 MMHG | BODY MASS INDEX: 24.86 KG/M2 | RESPIRATION RATE: 16 BRPM | WEIGHT: 164 LBS | HEIGHT: 68 IN | OXYGEN SATURATION: 98 % | SYSTOLIC BLOOD PRESSURE: 132 MMHG | HEART RATE: 75 BPM

## 2020-07-20 DIAGNOSIS — E78.2 MIXED HYPERLIPIDEMIA: ICD-10-CM

## 2020-07-20 DIAGNOSIS — I10 ESSENTIAL HYPERTENSION: Primary | ICD-10-CM

## 2020-07-20 DIAGNOSIS — I73.9 PAD (PERIPHERAL ARTERY DISEASE) (HCC): Chronic | ICD-10-CM

## 2020-07-20 DIAGNOSIS — E55.9 VITAMIN D DEFICIENCY: ICD-10-CM

## 2020-07-20 DIAGNOSIS — E53.8 LOW FOLIC ACID: ICD-10-CM

## 2020-07-20 DIAGNOSIS — R73.01 IMPAIRED FASTING GLUCOSE: ICD-10-CM

## 2020-07-20 DIAGNOSIS — I65.23 BILATERAL CAROTID ARTERY STENOSIS: Chronic | ICD-10-CM

## 2020-07-20 DIAGNOSIS — K21.00 GASTROESOPHAGEAL REFLUX DISEASE WITH ESOPHAGITIS: ICD-10-CM

## 2020-07-20 DIAGNOSIS — Z72.0 TOBACCO ABUSE: Chronic | ICD-10-CM

## 2020-07-20 DIAGNOSIS — I70.0 AORTIC ATHEROSCLEROSIS (HCC): Chronic | ICD-10-CM

## 2020-07-20 DIAGNOSIS — E53.8 LOW SERUM VITAMIN B12: ICD-10-CM

## 2020-07-20 PROBLEM — F41.8 SITUATIONAL ANXIETY: Status: RESOLVED | Noted: 2019-01-18 | Resolved: 2020-07-20

## 2020-07-20 PROBLEM — R45.89 DYSPHORIC MOOD: Status: RESOLVED | Noted: 2019-07-29 | Resolved: 2020-07-20

## 2020-07-20 PROCEDURE — 99214 OFFICE O/P EST MOD 30 MIN: CPT | Performed by: PHYSICIAN ASSISTANT

## 2020-07-20 RX ORDER — EZETIMIBE 10 MG/1
10 TABLET ORAL DAILY
Qty: 90 TABLET | Refills: 1 | Status: SHIPPED | OUTPATIENT
Start: 2020-07-20 | End: 2021-01-20 | Stop reason: SDUPTHER

## 2020-07-20 RX ORDER — VALSARTAN 320 MG/1
320 TABLET ORAL DAILY
Qty: 90 TABLET | Refills: 1 | Status: SHIPPED | OUTPATIENT
Start: 2020-07-20 | End: 2021-01-20 | Stop reason: SDUPTHER

## 2020-07-20 RX ORDER — ROSUVASTATIN CALCIUM 5 MG/1
TABLET, COATED ORAL
Qty: 30 TABLET | Refills: 11 | Status: SHIPPED | OUTPATIENT
Start: 2020-07-20 | End: 2021-07-20 | Stop reason: SDUPTHER

## 2020-07-20 RX ORDER — PANTOPRAZOLE SODIUM 40 MG/1
TABLET, DELAYED RELEASE ORAL
Qty: 180 TABLET | Refills: 1 | Status: SHIPPED | OUTPATIENT
Start: 2020-07-20 | End: 2021-01-20 | Stop reason: SDUPTHER

## 2020-07-20 NOTE — PROGRESS NOTES
"Subjective   Nelsy Stokes is a 65 y.o. female.     History of Present Illness    Since the last visit, she has overall felt fairly well.  She has Essential Hypertension and well controlled on current medication, Impaired fasting glucose and will monitor labs to watch for DMII, Hyperlipidemia on Zetia and is effective with lowering lipid values, Vitamin D deficiency and will update labs for continued management and Mixed hyperlipidemia and will try a statin 2-3 times a week.  Also peripheral artery disease, carotid atherosclerosis, abdominal aorta atherosclerosis.  she has been compliant with current medications have reviewed them.  The patient denies medication side effects.  Will refill medications. /70 (BP Location: Right arm, Patient Position: Sitting, Cuff Size: Large Adult)   Pulse 75   Temp 97.6 °F (36.4 °C) (Skin)   Resp 16   Ht 172.7 cm (68\")   Wt 74.4 kg (164 lb)   LMP  (LMP Unknown)   SpO2 98%   BMI 24.94 kg/m²   Still having GERD and on PPI BID---refer to GI; ? scope  Results for orders placed or performed in visit on 05/01/20   Comprehensive metabolic panel   Result Value Ref Range    Glucose 93 65 - 99 mg/dL    BUN 8 8 - 23 mg/dL    Creatinine 0.94 0.57 - 1.00 mg/dL    eGFR Non African Am 60 (L) >60 mL/min/1.73    eGFR African Am 72 >60 mL/min/1.73    BUN/Creatinine Ratio 8.5 7.0 - 25.0    Sodium 141 136 - 145 mmol/L    Potassium 4.8 3.5 - 5.2 mmol/L    Chloride 103 98 - 107 mmol/L    Total CO2 29.0 22.0 - 29.0 mmol/L    Calcium 9.6 8.6 - 10.5 mg/dL    Total Protein 6.7 6.0 - 8.5 g/dL    Albumin 4.70 3.50 - 5.20 g/dL    Globulin 2.0 gm/dL    A/G Ratio 2.4 g/dL    Total Bilirubin 0.6 0.0 - 1.2 mg/dL    Alkaline Phosphatase 81 39 - 117 U/L    AST (SGOT) 15 1 - 32 U/L    ALT (SGPT) 21 1 - 33 U/L   Lipid panel   Result Value Ref Range    Total Cholesterol 219 (H) 0 - 200 mg/dL    Triglycerides 94 0 - 150 mg/dL    HDL Cholesterol 54 40 - 60 mg/dL    VLDL Cholesterol 18.8 mg/dL    LDL " Cholesterol  146 (H) 0 - 100 mg/dL   Hemoglobin A1c   Result Value Ref Range    Hemoglobin A1C 6.10 (H) 4.80 - 5.60 %   Vitamin B12   Result Value Ref Range    Vitamin B-12 354 211 - 946 pg/mL   Folate   Result Value Ref Range    Folate 3.22 (L) 4.78 - 24.20 ng/mL   CBC & Differential   Result Value Ref Range    WBC 9.16 3.40 - 10.80 10*3/mm3    RBC 5.05 3.77 - 5.28 10*6/mm3    Hemoglobin 15.9 12.0 - 15.9 g/dL    Hematocrit 46.2 34.0 - 46.6 %    MCV 91.5 79.0 - 97.0 fL    MCH 31.5 26.6 - 33.0 pg    MCHC 34.4 31.5 - 35.7 g/dL    RDW 12.8 12.3 - 15.4 %    Platelets 330 140 - 450 10*3/mm3    Neutrophil Rel % 59.6 42.7 - 76.0 %    Lymphocyte Rel % 30.0 19.6 - 45.3 %    Monocyte Rel % 7.4 5.0 - 12.0 %    Eosinophil Rel % 1.9 0.3 - 6.2 %    Basophil Rel % 0.8 0.0 - 1.5 %    Neutrophils Absolute 5.46 1.70 - 7.00 10*3/mm3    Lymphocytes Absolute 2.75 0.70 - 3.10 10*3/mm3    Monocytes Absolute 0.68 0.10 - 0.90 10*3/mm3    Eosinophils Absolute 0.17 0.00 - 0.40 10*3/mm3    Basophils Absolute 0.07 0.00 - 0.20 10*3/mm3    Immature Granulocyte Rel % 0.3 0.0 - 0.5 %    Immature Grans Absolute 0.03 0.00 - 0.05 10*3/mm3    nRBC 0.0 0.0 - 0.2 /100 WBC     Nuclear stress test showed a very small amount of mild ischemia in the distal inferior wall, hyperdynamic EF greater than 70%, small left ventricle, and overall impression consistent with a low risk study.  I discussed the test results with the patient.  She was concerned that her blood pressure went up with exercise and into recovery I told her that we will just continue to monitor this.  She is no longer having the chest pain or shortness of breath, I recommended no further treatment at this time.  If she continues to have chest pain, she needs to contact our office and we will potentially add beta-blocker therapy or proceed with coronary angiogram.  She does have many risk factors for coronary artery disease and will just continue to monitor.     Plan for her to try Crestor at  5mg 2-3 times a week. On Zetia  She did see cardiology in March and then went over the nuclear stress test and results showing a small amount of ischemia that is mild in the distal inferior wall with EF greater than 70%.  It was overall low risk study.  No new medication was added consider beta-blocker if chest pain continues.  Also suggested that she try a statin again and I will implement this by trying Crestor at 5 mg and just do 2 to 3 days a week.--f/u 6 mos  Did see allergy asthma for follow-up in March and noted much improved since adding the anticholinergic medication for COPD.  CT chest due again in October for surveillance of the nodule    Saw vascular in February and did DC Plavix due to significant bruising.  Continues to be followed for peripheral artery disease carotid artery disease aortic atherosclerosis.  Told to follow-up 1 year.  Off SSRI and doing fine with mood and anxiety  Folic acid down-----now on Rx  Also taking B12  Had labs 7-16-20  Same renal labs EGFR 60  A1C improved  The following portions of the patient's history were reviewed and updated as appropriate: allergies, current medications, past family history, past medical history, past social history, past surgical history and problem list.    Review of Systems   Constitutional: Negative for activity change, appetite change and unexpected weight change.   HENT: Negative for nosebleeds and trouble swallowing.    Eyes: Negative for pain and visual disturbance.   Respiratory: Negative for chest tightness, shortness of breath and wheezing.    Cardiovascular: Negative for chest pain and palpitations.   Gastrointestinal: Negative for abdominal pain and blood in stool.   Endocrine: Negative.    Genitourinary: Negative for difficulty urinating and hematuria.   Musculoskeletal: Negative for joint swelling.   Skin: Negative for color change and rash.   Allergic/Immunologic: Negative.    Neurological: Negative for syncope and speech difficulty.    Hematological: Negative for adenopathy. Bruises/bleeds easily.   Psychiatric/Behavioral: Negative for agitation and confusion.   All other systems reviewed and are negative.      Objective   Physical Exam   Constitutional: She is oriented to person, place, and time. She appears well-developed and well-nourished. No distress.   HENT:   Head: Normocephalic and atraumatic.   Eyes: Pupils are equal, round, and reactive to light. Conjunctivae and EOM are normal. Right eye exhibits no discharge. Left eye exhibits no discharge. No scleral icterus.   Neck: Normal range of motion. Neck supple. No tracheal deviation present. No thyromegaly present.   Cardiovascular: Normal rate, regular rhythm, normal heart sounds and normal pulses. Exam reveals no gallop.   No murmur heard.  Decreased pedal pulses, no edema   Pulmonary/Chest: Effort normal and breath sounds normal. No respiratory distress. She has no wheezes. She has no rales.   Abdominal: There is tenderness.   epigastric   Musculoskeletal: Normal range of motion.   Neurological: She is alert and oriented to person, place, and time. She exhibits normal muscle tone. Coordination normal.   Skin: Skin is warm. No rash noted. No erythema. No pallor.   Psychiatric: She has a normal mood and affect. Her behavior is normal. Judgment and thought content normal.   Nursing note and vitals reviewed.      Assessment/Plan   Nelsy was seen today for hypertension.    Diagnoses and all orders for this visit:    Essential hypertension  -     Comprehensive metabolic panel; Future  -     Lipid panel; Future  -     Hemoglobin A1c; Future  -     Vitamin B12; Future  -     Folate; Future  -     CBC & Differential; Future  -     Vitamin D 25 Hydroxy; Future  -     Ambulatory Referral to Gastroenterology    Gastroesophageal reflux disease with esophagitis  -     Cancel: Ambulatory Referral to Gastroenterology  -     Comprehensive metabolic panel; Future  -     Lipid panel; Future  -      Hemoglobin A1c; Future  -     Vitamin B12; Future  -     Folate; Future  -     CBC & Differential; Future  -     Vitamin D 25 Hydroxy; Future  -     Ambulatory Referral to Gastroenterology    Impaired fasting glucose  -     Comprehensive metabolic panel; Future  -     Lipid panel; Future  -     Hemoglobin A1c; Future  -     Vitamin B12; Future  -     Folate; Future  -     CBC & Differential; Future  -     Vitamin D 25 Hydroxy; Future  -     Ambulatory Referral to Gastroenterology    Mixed hyperlipidemia  -     Comprehensive metabolic panel; Future  -     Lipid panel; Future  -     Hemoglobin A1c; Future  -     Vitamin B12; Future  -     Folate; Future  -     CBC & Differential; Future  -     Vitamin D 25 Hydroxy; Future  -     Ambulatory Referral to Gastroenterology    Bilateral carotid artery stenosis  -     Comprehensive metabolic panel; Future  -     Lipid panel; Future  -     Hemoglobin A1c; Future  -     Vitamin B12; Future  -     Folate; Future  -     CBC & Differential; Future  -     Vitamin D 25 Hydroxy; Future  -     Ambulatory Referral to Gastroenterology    Low folic acid  -     Comprehensive metabolic panel; Future  -     Lipid panel; Future  -     Hemoglobin A1c; Future  -     Vitamin B12; Future  -     Folate; Future  -     CBC & Differential; Future  -     Vitamin D 25 Hydroxy; Future  -     Ambulatory Referral to Gastroenterology    Tobacco abuse  -     Comprehensive metabolic panel; Future  -     Lipid panel; Future  -     Hemoglobin A1c; Future  -     Vitamin B12; Future  -     Folate; Future  -     CBC & Differential; Future  -     Vitamin D 25 Hydroxy; Future  -     Ambulatory Referral to Gastroenterology    Aortic atherosclerosis (CMS/HCC)  -     Comprehensive metabolic panel; Future  -     Lipid panel; Future  -     Hemoglobin A1c; Future  -     Vitamin B12; Future  -     Folate; Future  -     CBC & Differential; Future  -     Vitamin D 25 Hydroxy; Future  -     Ambulatory Referral to  Gastroenterology    PAD (peripheral artery disease) (CMS/[facility])  -     Comprehensive metabolic panel; Future  -     Lipid panel; Future  -     Hemoglobin A1c; Future  -     Vitamin B12; Future  -     Folate; Future  -     CBC & Differential; Future  -     Vitamin D 25 Hydroxy; Future  -     Ambulatory Referral to Gastroenterology    Low serum vitamin B12  -     Comprehensive metabolic panel; Future  -     Lipid panel; Future  -     Hemoglobin A1c; Future  -     Vitamin B12; Future  -     Folate; Future  -     CBC & Differential; Future  -     Vitamin D 25 Hydroxy; Future  -     Ambulatory Referral to Gastroenterology    Vitamin D deficiency  -     Comprehensive metabolic panel; Future  -     Lipid panel; Future  -     Hemoglobin A1c; Future  -     Vitamin B12; Future  -     Folate; Future  -     CBC & Differential; Future  -     Vitamin D 25 Hydroxy; Future  -     Ambulatory Referral to Gastroenterology    Other orders  -     rosuvastatin (Crestor) 5 MG tablet; 1 PO 2-3 times a week For cholesterol  -     valsartan (DIOVAN) 320 MG tablet; Take 1 tablet by mouth Daily. New dose for HTN  -     pantoprazole (Protonix) 40 MG EC tablet; One PO BID---new dose For GERD  -     ezetimibe (ZETIA) 10 MG tablet; Take 1 tablet by mouth Daily. For cholesterol      Cont. B12 and folate  Labs 3 mos  Stop smoking  Trial statin 2-3 times a wee  Cont. Zetia  F/u vascular, cardio, allergist  Refer GI for GERD--on PPI BID

## 2020-07-20 NOTE — PATIENT INSTRUCTIONS
Dyslipidemia  Dyslipidemia is an imbalance of waxy, fat-like substances (lipids) in the blood. The body needs lipids in small amounts. Dyslipidemia often involves a high level of cholesterol or triglycerides, which are types of lipids.  Common forms of dyslipidemia include:  · High levels of LDL cholesterol. LDL is the type of cholesterol that causes fatty deposits (plaques) to build up in the blood vessels that carry blood away from your heart (arteries).  · Low levels of HDL cholesterol. HDL cholesterol is the type of cholesterol that protects against heart disease. High levels of HDL remove the LDL buildup from arteries.  · High levels of triglycerides. Triglycerides are a fatty substance in the blood that is linked to a buildup of plaques in the arteries.  What are the causes?  Primary dyslipidemia is caused by changes (mutations) in genes that are passed down through families (inherited). These mutations cause several types of dyslipidemia.  Secondary dyslipidemia is caused by lifestyle choices and diseases that lead to dyslipidemia, such as:  · Eating a diet that is high in animal fat.  · Not getting enough exercise.  · Having diabetes, kidney disease, liver disease, or thyroid disease.  · Drinking large amounts of alcohol.  · Using certain medicines.  What increases the risk?  You are more likely to develop this condition if you are an older man or if you are a woman who has gone through menopause. Other risk factors include:  · Having a family history of dyslipidemia.  · Taking certain medicines, including birth control pills, steroids, some diuretics, and beta-blockers.  · Smoking cigarettes.  · Eating a high-fat diet.  · Having certain medical conditions such as diabetes, polycystic ovary syndrome (PCOS), kidney disease, liver disease, or hypothyroidism.  · Not exercising regularly.  · Being overweight or obese with too much belly fat.  What are the signs or symptoms?  In most cases, dyslipidemia does not  usually cause any symptoms.  In severe cases, very high lipid levels can cause:  · Fatty bumps under the skin (xanthomas).  · White or gray ring around the black center (pupil) of the eye.  Very high triglyceride levels can cause inflammation of the pancreas (pancreatitis).  How is this diagnosed?  Your health care provider may diagnose dyslipidemia based on a routine blood test (fasting blood test). Because most people do not have symptoms of the condition, this blood testing (lipid profile) is done on adults age 20 and older and is repeated every 5 years. This test checks:  · Total cholesterol. This measures the total amount of cholesterol in your blood, including LDL cholesterol, HDL cholesterol, and triglycerides. A healthy number is below 200.  · LDL cholesterol. The target number for LDL cholesterol is different for each person, depending on individual risk factors. Ask your health care provider what your LDL cholesterol should be.  · HDL cholesterol. An HDL level of 60 or higher is best because it helps to protect against heart disease. A number below 40 for men or below 50 for women increases the risk for heart disease.  · Triglycerides. A healthy triglyceride number is below 150.  If your lipid profile is abnormal, your health care provider may do other blood tests.  How is this treated?  Treatment depends on the type of dyslipidemia that you have and your other risk factors for heart disease and stroke. Your health care provider will have a target range for your lipid levels based on this information.  For many people, this condition may be treated by lifestyle changes, such as diet and exercise. Your health care provider may recommend that you:  · Get regular exercise.  · Make changes to your diet.  · Quit smoking if you smoke.  If diet changes and exercise do not help you reach your goals, your health care provider may also prescribe medicine to lower lipids. The most commonly prescribed type of medicine  lowers your LDL cholesterol (statin drug). If you have a high triglyceride level, your provider may prescribe another type of drug (fibrate) or an omega-3 fish oil supplement, or both.  Follow these instructions at home:    Eating and drinking  · Follow instructions from your health care provider or dietitian about eating or drinking restrictions.  · Eat a healthy diet as told by your health care provider. This can help you reach and maintain a healthy weight, lower your LDL cholesterol, and raise your HDL cholesterol. This may include:  ? Limiting your calories, if you are overweight.  ? Eating more fruits, vegetables, whole grains, fish, and lean meats.  ? Limiting saturated fat, trans fat, and cholesterol.  · If you drink alcohol:  ? Limit how much you use.  ? Be aware of how much alcohol is in your drink. In the [place] one drink equals one 12 oz bottle of beer (355 mL), one 5 oz glass of wine (148 mL), or one 1½ oz glass of hard liquor (44 mL).  · Do not drink alcohol if:  ? Your health care provider tells you not to drink.  ? You are pregnant, may be pregnant, or are planning to become pregnant.  Activity  · Get regular exercise. Start an exercise and strength training program as told by your health care provider. Ask your health care provider what activities are safe for you. Your health care provider may recommend:  ? 30 minutes of aerobic activity 4-6 days a week. Brisk walking is an example of aerobic activity.  ? Strength training 2 days a week.  General instructions  · Do not use any products that contain nicotine or tobacco, such as cigarettes, e-cigarettes, and chewing tobacco. If you need help quitting, ask your health care provider.  · Take over-the-counter and prescription medicines only as told by your health care provider. This includes supplements.  · Keep all follow-up visits as told by your health care provider.  Contact a health care provider if:  · You are:  ? Having trouble sticking to your  exercise or diet plan.  ? Struggling to quit smoking or control your use of alcohol.  Summary  · Dyslipidemia often involves a high level of cholesterol or triglycerides, which are types of lipids.  · Treatment depends on the type of dyslipidemia that you have and your other risk factors for heart disease and stroke.  · For many people, treatment starts with lifestyle changes, such as diet and exercise.  · Your health care provider may prescribe medicine to lower lipids.  This information is not intended to replace advice given to you by your health care provider. Make sure you discuss any questions you have with your health care provider.  Document Released: 12/23/2014 Document Revised: 08/12/2019 Document Reviewed: 07/19/2019  [employer] Patient Education © 2020 [employer]

## 2020-08-17 ENCOUNTER — TELEPHONE (OUTPATIENT)
Dept: CARDIOLOGY | Facility: CLINIC | Age: 65
End: 2020-08-17

## 2020-08-17 NOTE — TELEPHONE ENCOUNTER
Is the procedure go to be completed before 9/4/2020?  She is scheduled to see Dr. Bueno at that time.  If she needs to have a before 9/4, she probably needs to see myself or another nurse practitioner in the office for perioperative cardiac risk assessment.  She was having chest pain and shortness of breath on her last visit.

## 2020-08-17 NOTE — TELEPHONE ENCOUNTER
Eliana with [facility] is calling to request surgery clearance. She is schedule for a Shock wave lithotripsy kidney stone. Is it ok for the pt to hold her ASA 1 week prior? Does she need any further testing?    You last seen the pt on 3-4-20.    Eliana #: [phone]

## 2020-08-18 NOTE — TELEPHONE ENCOUNTER
Pt has been schedule on Thursday for clearance. Pt is aware and Eliana is aware that she needs to be seen before she is clear for surgery.

## 2020-08-20 ENCOUNTER — OFFICE VISIT (OUTPATIENT)
Dept: CARDIOLOGY | Facility: CLINIC | Age: 65
End: 2020-08-20

## 2020-08-20 VITALS
HEIGHT: 68 IN | BODY MASS INDEX: 25.25 KG/M2 | DIASTOLIC BLOOD PRESSURE: 80 MMHG | SYSTOLIC BLOOD PRESSURE: 150 MMHG | HEART RATE: 75 BPM | WEIGHT: 166.6 LBS

## 2020-08-20 DIAGNOSIS — Z01.810 ENCOUNTER FOR PRE-OPERATIVE CARDIOVASCULAR CLEARANCE: Primary | ICD-10-CM

## 2020-08-20 DIAGNOSIS — E78.2 MIXED HYPERLIPIDEMIA: ICD-10-CM

## 2020-08-20 DIAGNOSIS — Z72.0 TOBACCO ABUSE: Chronic | ICD-10-CM

## 2020-08-20 DIAGNOSIS — I10 ESSENTIAL HYPERTENSION: ICD-10-CM

## 2020-08-20 DIAGNOSIS — I73.9 PAD (PERIPHERAL ARTERY DISEASE) (HCC): Chronic | ICD-10-CM

## 2020-08-20 PROCEDURE — 99214 OFFICE O/P EST MOD 30 MIN: CPT | Performed by: NURSE PRACTITIONER

## 2020-08-20 PROCEDURE — 93000 ELECTROCARDIOGRAM COMPLETE: CPT | Performed by: NURSE PRACTITIONER

## 2020-08-20 NOTE — PROGRESS NOTES
Date of Office Visit: 2020  Encounter Provider: BENITO Douglas  Place of Service: [facility]  Patient Name: Nelsy Stokes  :1955  Primary Cardiologist: Dr. Julito Bueno    Chief Complaint   Patient presents with   • Pre-op Exam   :     Dear Dr. Norton,     HPI: Nelsy Stokes is a pleasant 65 y.o. female who presents today for perioperative cardiac risk assessment.  She has been recommended to undergo left lithotripsy by Dr. Octaviano Norton in the near future.    She has been diagnosed with peripheral arterial disease (carotid artery stenosis, abdominal atherosclerotic disease, and subclavian artery stenosis), hyperlipidemia, and is a cigarette smoker. In 2018, she was having chest pain and had a normal treadmill stress test.     On 2020 she was evaluated by Dr. Julito Bueno.  She reported midsternal chest pain, shortness of breath, nonproductive cough, and decreased energy level.  Dr. Beuno recommended smoking sensation.  He arranged a Lexiscan Cardiolite stress test and echocardiogram.  Both tests were completed 2020 and the echocardiogram is as follows: EF 62%, aortic valve sclerosis without stenosis, trace mitral valve regurgitation, and trace to mild tricuspid valve regurgitation.  Nuclear stress test showed a very small amount of mild ischemia in the distal inferior wall, hyperdynamic EF greater than 70%, small left ventricle, and overall impression consistent with a low risk study.      In 2020, she followed up with me in the office.  We discussed her stress test results.  She said she had been seen by Dr. Hernandez for shortness of breath and possibly had COPD.  Her shortness of breath and chest pain resolved with the addition of Advair and Spiriva.  She was recommended to quit smoking.    Today she is doing well.  She has some mild dyspnea when she overexerts, but not with her daily activities.  She reports a dry cough at  times.  She denies chest pain, palpitations, edema, dizziness, or syncope.  She is taking her statin about 3 days a week because she experiences myalgias.  She is not having any pain from the kidney stone.  She started using 2 drops of CBD oil under her tongue every day and overall is feeling much better and has better energy.  She is still smoking about 1/2 pack to a full pack of cigarettes daily.  Her blood pressure is elevated today which is unusual for her.  She reports some mild bruising on aspirin therapy.    Past Medical History:   Diagnosis Date   • Acid reflux    • Anxiety    • Aortic atherosclerosis (CMS/HCC) 10/23/2018   • Bradycardia    • Carotid artery disease (CMS/HCC)    • Carotid artery stenosis    • Chest pain     NORMAL STRESS TEST 10/23/18   • Claudication (CMS/HCC)    • History of colon polyps    • Hyperlipidemia    • Kidney stone on left side    • Osteopenia    • Osteoporosis    • Palpitations    • PVD (peripheral vascular disease) (CMS/HCC)    • Subclavian artery stenosis (CMS/[facility])    • Tobacco abuse        Past Surgical History:   Procedure Laterality Date   • KNEE CARTILAGE SURGERY Right        Social History     Socioeconomic History   • Marital status:      Spouse name: Not on file   • Number of children: Not on file   • Years of education: Not on file   • Highest education level: Not on file   Tobacco Use   • Smoking status: Current Every Day Smoker     Packs/day: 1.00     Years: 45.00     Pack years: 45.00     Types: Cigarettes   • Smokeless tobacco: Never Used   • Tobacco comment: caff use: 3 cups daily   Substance and Sexual Activity   • Alcohol use: Yes     Comment: SOCIALLY   • Drug use: No   • Sexual activity: Yes     Partners: Male     Comment:        Family History   Problem Relation Age of Onset   • Hypertension Father    • Lung disease Father    • Heart attack Father 62   • Anesthesia problems Father    • Heart failure Father 88   • Aneurysm Father         Abdominal  Aortic Aneurysm   • Cancer Father    • COPD Father    • Atrial fibrillation Father    • Cancer Sister    • Heart disease Mother 83   • Hypertension Mother    • Atrial fibrillation Mother    • Malig Hyperthermia Neg Hx        The following portion of the patient's history were reviewed and updated as appropriate: past medical history, past surgical history, past social history, past family history, allergies, current medications, and problem list.    Review of Systems   Constitution: Negative for chills, diaphoresis, fever, malaise/fatigue, night sweats, weight gain and weight loss.   HENT: Negative for hearing loss, nosebleeds, sore throat and tinnitus.    Eyes: Negative for blurred vision, double vision, pain and visual disturbance.   Cardiovascular: Negative for chest pain, claudication, cyanosis, dyspnea on exertion, irregular heartbeat, leg swelling, near-syncope, orthopnea, palpitations, paroxysmal nocturnal dyspnea and syncope.   Respiratory: Negative for cough, hemoptysis, shortness of breath, snoring and wheezing.    Endocrine: Negative for cold intolerance, heat intolerance and polyuria.   Hematologic/Lymphatic: Negative for bleeding problem. Does not bruise/bleed easily.   Skin: Negative for color change, dry skin, flushing and itching.   Musculoskeletal: Negative for falls, joint pain, joint swelling, muscle cramps, muscle weakness and myalgias.   Gastrointestinal: Negative for abdominal pain, constipation, heartburn, melena, nausea and vomiting.   Genitourinary: Negative for dysuria and hematuria.   Neurological: Negative for excessive daytime sleepiness, dizziness, light-headedness, loss of balance, numbness, paresthesias, seizures and vertigo.   Psychiatric/Behavioral: Negative for altered mental status, depression, memory loss and substance abuse. The patient does not have insomnia and is not nervous/anxious.    Allergic/Immunologic: Negative for environmental allergies.       Allergies   Allergen  "Reactions   • Pitavastatin Myalgia         Current Outpatient Medications:   •  albuterol sulfate  (90 Base) MCG/ACT inhaler, Inhale 2 puffs Every 4 (Four) Hours As Needed for Wheezing., Disp: 1 inhaler, Rfl: 11  •  aspirin 81 MG EC tablet, Take 81 mg by mouth Daily., Disp: , Rfl:   •  Cyanocobalamin (VITAMIN B-12) 1000 MCG sublingual tablet, One SL daily, Disp: 90 each, Rfl: 3  •  ezetimibe (ZETIA) 10 MG tablet, Take 1 tablet by mouth Daily. For cholesterol, Disp: 90 tablet, Rfl: 1  •  fluticasone-salmeterol (ADVAIR) 250-50 MCG/DOSE DISKUS, , Disp: , Rfl:   •  folic acid (FOLVITE) 1 MG tablet, Take 1 tablet by mouth Daily., Disp: 90 tablet, Rfl: 1  •  pantoprazole (Protonix) 40 MG EC tablet, One PO BID---new dose For GERD, Disp: 180 tablet, Rfl: 1  •  rosuvastatin (Crestor) 5 MG tablet, 1 PO 2-3 times a week For cholesterol, Disp: 30 tablet, Rfl: 11  •  SPIRIVA RESPIMAT 2.5 MCG/ACT aerosol solution inhaler, , Disp: , Rfl:   •  valsartan (DIOVAN) 320 MG tablet, Take 1 tablet by mouth Daily. New dose for HTN, Disp: 90 tablet, Rfl: 1        Objective:     Vitals:    08/20/20 1031   BP: 150/80   BP Location: Right arm   Pulse: 75   Weight: 75.6 kg (166 lb 9.6 oz)   Height: 172.7 cm (68\")     Body mass index is 25.33 kg/m².    PHYSICAL EXAM:    Vitals Reviewed.   General Appearance: No acute distress, well developed and well nourished.    Eyes: Conjunctiva and lids: No erythema, swelling, or discharge. Sclera non-icteric.   HENT: Atraumatic, normocephalic. External eyes, ears, and nose normal. No hearing loss noted. Mucous membranes normal. Lips not cyanotic. Neck supple with no tenderness.  Respiratory: No signs of respiratory distress. Respiration rhythm and depth normal.   Clear to auscultation. No rales, crackles, rhonchi, or wheezing auscultated.   Cardiovascular:  Jugular Venous Pressure: Normal  Heart Rate and Rhythm: Normal, Heart Sounds: Normal S1 and S2. No S3 or S4 noted.  Murmurs: No murmurs noted. No " rubs, thrills, or gallops.   Arterial Pulses: Carotid pulses normal. No carotid bruit noted.    Lower Extremities: No edema noted.  Gastrointestinal:  Abdomen soft, non-distended, non-tender. Normal bowel sounds.   Musculoskeletal: Normal movement of extremities  Skin and Nails: General appearance normal. No pallor, cyanosis, diaphoresis. Skin temperature normal. No clubbing of fingernails.   Psychiatric: Patient alert and oriented to person, place, and time. Speech and behavior appropriate. Normal mood and affect.       ECG 12 Lead  Date/Time: 8/20/2020 10:32 AM  Performed by: Diane Bryant APRN  Authorized by: Diane Bryant APRN   Comparison: compared with previous ECG from 2/11/2020  Similar to previous ECG  Rhythm: sinus rhythm  Rate: normal  BPM: 75  Q waves: V1, V2 and V3    ST Segments: ST segments normal  T Waves: T waves normal  QRS axis: normal  Other: no other findings    Clinical impression: non-specific ECG              Assessment:       Diagnosis Plan   1. Encounter for pre-operative cardiovascular clearance     2. Essential hypertension     3. Mixed hyperlipidemia     4. PAD (peripheral artery disease) (CMS/HCC)     5. Tobacco abuse            Plan:       1.  Perioperative Cardiac Risk Assessment: She had a low risk nuclear stress test in the past and denies anginal symptoms.  Patient is considered at acceptable risk for surgery from a cardiovascular standpoint. According to the [facility]'s Revised Cardiac Risk Index, patient is considered very low risk (0.4%) of adverse cardiovascular events occurring with moderate risk surgery.     2.  Hypertension: Blood pressure elevated today which is unusual for her.  Working to continue to monitor.      3.    Hyperlipidemia: She will continue with the statin therapy 3 or 4 days/week.     4.  PAD: History of abdominal aorta atherosclerosis, carotid artery disease, and subclavian stenosis.    She remains on aspirin and rosuvastatin.  She will be seeing Dr. Frank  Salo later today.    5.  Tobacco Use: I have highly recommend that she abstain from cigarette smoking.     6.  She will follow-up with Dr. Julito Bueno in 1 year, unless otherwise needed sooner.      As always, it has been a pleasure to participate in your patient's care. Thank you.       Sincerely,       BENITO Salguero  [facility]      · COVID-19 Precautions - Patient was compliant in wearing a mask. When I saw the patient, I used appropriate personal protective equipment (PPE) including mask (standard procedure).  Additionally, I used gown, gloves, and eye shield if indicated.  Hand hygiene was completed before and after seeing the patient.  · Dictated utilizing Dragon Dictation

## 2020-09-02 ENCOUNTER — OFFICE VISIT (OUTPATIENT)
Dept: GASTROENTEROLOGY | Facility: CLINIC | Age: 65
End: 2020-09-02

## 2020-09-02 VITALS
DIASTOLIC BLOOD PRESSURE: 75 MMHG | WEIGHT: 166.8 LBS | BODY MASS INDEX: 25.28 KG/M2 | HEIGHT: 68 IN | SYSTOLIC BLOOD PRESSURE: 130 MMHG | TEMPERATURE: 98.5 F

## 2020-09-02 DIAGNOSIS — Z72.0 TOBACCO ABUSE: Chronic | ICD-10-CM

## 2020-09-02 DIAGNOSIS — R11.0 NAUSEA: ICD-10-CM

## 2020-09-02 DIAGNOSIS — K21.9 GASTROESOPHAGEAL REFLUX DISEASE, ESOPHAGITIS PRESENCE NOT SPECIFIED: Primary | ICD-10-CM

## 2020-09-02 PROCEDURE — 99204 OFFICE O/P NEW MOD 45 MIN: CPT | Performed by: INTERNAL MEDICINE

## 2020-09-02 RX ORDER — SODIUM CHLORIDE, SODIUM LACTATE, POTASSIUM CHLORIDE, CALCIUM CHLORIDE 600; 310; 30; 20 MG/100ML; MG/100ML; MG/100ML; MG/100ML
30 INJECTION, SOLUTION INTRAVENOUS CONTINUOUS
Status: CANCELLED | OUTPATIENT
Start: 2020-10-09

## 2020-09-02 NOTE — PROGRESS NOTES
Chief Complaint   Patient presents with   • Heartburn     Subjective     HPI  Nelsy Stokes is a 65 y.o. female who presents for evaluation of heartburn.  Symptoms present for a year or more.  Describes burning acid, mostly at her epigastric region with radiation into her esophagus.  Occurs after most meals, worse after dinner.  Can be associated with nausea at times but no vomiting.  Persists despite twice daily ppi.  Rare dysphagia.  No globus.  No recent EGD.  No excessive NSAIDs    Maternal aunt with CRC    Colonoscopy 11/2018, 9mm rectal polyp removed    Smokes 1ppd, trying to quit.  No ETOH    Retired from [employer]     Today's visit was in the office.  Both the patient and I were wearing face masks and proper hand hygiene was performed before and after the physical exam.     Past Medical History:   Diagnosis Date   • Acid reflux    • Anxiety    • Aortic atherosclerosis (CMS/HCC) 10/23/2018   • Bradycardia    • Carotid artery disease (CMS/HCC)    • Carotid artery stenosis    • Chest pain     NORMAL STRESS TEST 10/23/18   • Claudication (CMS/HCC)    • History of colon polyps    • Hyperlipidemia    • Kidney stone on left side    • Osteopenia    • Osteoporosis    • Palpitations    • PVD (peripheral vascular disease) (CMS/HCC)    • Subclavian artery stenosis (CMS/HCC)    • Tobacco abuse        Social History     Socioeconomic History   • Marital status:      Spouse name: Not on file   • Number of children: Not on file   • Years of education: Not on file   • Highest education level: Not on file   Tobacco Use   • Smoking status: Current Every Day Smoker     Packs/day: 1.00     Years: 45.00     Pack years: 45.00     Types: Cigarettes   • Smokeless tobacco: Never Used   • Tobacco comment: caff use: 3 cups daily   Substance and Sexual Activity   • Alcohol use: Yes     Comment: SOCIALLY   • Drug use: No   • Sexual activity: Yes     Partners: Male     Comment:          Current Outpatient Medications:   •   "albuterol sulfate  (90 Base) MCG/ACT inhaler, Inhale 2 puffs Every 4 (Four) Hours As Needed for Wheezing., Disp: 1 inhaler, Rfl: 11  •  ezetimibe (ZETIA) 10 MG tablet, Take 1 tablet by mouth Daily. For cholesterol, Disp: 90 tablet, Rfl: 1  •  fluticasone-salmeterol (ADVAIR) 250-50 MCG/DOSE DISKUS, , Disp: , Rfl:   •  folic acid (FOLVITE) 1 MG tablet, Take 1 tablet by mouth Daily., Disp: 90 tablet, Rfl: 1  •  pantoprazole (Protonix) 40 MG EC tablet, One PO BID---new dose For GERD, Disp: 180 tablet, Rfl: 1  •  rosuvastatin (Crestor) 5 MG tablet, 1 PO 2-3 times a week For cholesterol, Disp: 30 tablet, Rfl: 11  •  SPIRIVA RESPIMAT 2.5 MCG/ACT aerosol solution inhaler, , Disp: , Rfl:   •  valsartan (DIOVAN) 320 MG tablet, Take 1 tablet by mouth Daily. New dose for HTN, Disp: 90 tablet, Rfl: 1  •  aspirin 81 MG EC tablet, Take 81 mg by mouth Daily., Disp: , Rfl:   •  Cyanocobalamin (VITAMIN B-12) 1000 MCG sublingual tablet, One SL daily, Disp: 90 each, Rfl: 3    Review of Systems   Constitutional: Negative for activity change, appetite change and fatigue.   HENT: Negative for sore throat and trouble swallowing.    Respiratory: Negative.    Cardiovascular: Negative.    Gastrointestinal: Negative for abdominal distention, abdominal pain and blood in stool.  Positive for GERD  Endocrine: Negative for cold intolerance and heat intolerance.   Genitourinary: Negative for difficulty urinating, dysuria and frequency.   Musculoskeletal: Negative for arthralgias, back pain and myalgias.   Skin: Negative.    Hematological: Negative for adenopathy. Does not bruise/bleed easily.   All other systems reviewed and are negative.    Objective   Vitals:    09/02/20 1431   BP: 130/75   Temp: 98.5 °F (36.9 °C)         09/02/20  1431   Weight: 75.7 kg (166 lb 12.8 oz)     Body mass index is 25.36 kg/m².          /75   Temp 98.5 °F (36.9 °C)   Ht 172.7 cm (68\")   Wt 75.7 kg (166 lb 12.8 oz)   LMP  (LMP Unknown)   BMI 25.36 " kg/m²     General Appearance:  Alert, cooperative, no distress, appears stated age   Head:  Normocephalic, without obvious abnormality, atraumatic   Eyes:  PERRL, conjunctiva/corneas clear, EOM's intact   Ears:  Normal external appearance of ear, hearing intact   Nose: Nares normal,mucosa normal, no drainage or sinus tenderness   Throat: Lips, mucosa, and tongue normal; teeth and gums normal   Neck: Supple, symmetrical, trachea midline, no adenopathy;  thyroid: not enlarged, symmetric, no tenderness/mass/nodule   Back:   Symmetric, no curvature, ROM normal   Lungs:   Clear to auscultation bilaterally, respirations unlabored, effort normal   Heart:  Regular rate and rhythm, S1 and S2 normal, no murmur, rub, or gallop, no lower extremity edema   Abdomen:   Soft, non-tender, bowel sounds active all four quadrants,  no masses, no organomegaly   Rectal: Deferred   Musculoskeletal: Normal gait, normal station, no atrophy of extremities, normal strength and tone   Skin: Normal color, texture, and turgor, no rashes or lesions   Lymph nodes: Cervical and supraclavicular nodes normal   Psychiatric: Alert and oriented x 3, normal mood and affect,  normal recent and remote memory, normal judgment and insight         WBC   Date Value Ref Range Status   07/16/2020 9.16 3.40 - 10.80 10*3/mm3 Final     RBC   Date Value Ref Range Status   07/16/2020 5.05 3.77 - 5.28 10*6/mm3 Final     Hemoglobin   Date Value Ref Range Status   07/16/2020 15.9 12.0 - 15.9 g/dL Final   12/14/2018 15.6 (H) 11.9 - 15.5 g/dL Final     Hematocrit   Date Value Ref Range Status   07/16/2020 46.2 34.0 - 46.6 % Final   12/14/2018 49.0 (H) 35.6 - 45.5 % Final     MCV   Date Value Ref Range Status   07/16/2020 91.5 79.0 - 97.0 fL Final   12/14/2018 98.6 (H) 80.5 - 98.2 fL Final     MCH   Date Value Ref Range Status   07/16/2020 31.5 26.6 - 33.0 pg Final   12/14/2018 31.4 26.9 - 32.0 pg Final     MCHC   Date Value Ref Range Status   07/16/2020 34.4 31.5 - 35.7  g/dL Final   12/14/2018 31.8 (L) 32.4 - 36.3 g/dL Final     RDW   Date Value Ref Range Status   07/16/2020 12.8 12.3 - 15.4 % Final   12/14/2018 13.8 (H) 11.7 - 13.0 % Final     RDW-SD   Date Value Ref Range Status   12/14/2018 49.8 37.0 - 54.0 fl Final     MPV   Date Value Ref Range Status   12/14/2018 9.5 6.0 - 12.0 fL Final     Platelets   Date Value Ref Range Status   07/16/2020 330 140 - 450 10*3/mm3 Final   12/14/2018 277 140 - 500 10*3/mm3 Final     Neutrophil Rel %   Date Value Ref Range Status   07/16/2020 59.6 42.7 - 76.0 % Final     Lymphocyte Rel %   Date Value Ref Range Status   07/16/2020 30.0 19.6 - 45.3 % Final     Monocyte Rel %   Date Value Ref Range Status   07/16/2020 7.4 5.0 - 12.0 % Final     Eosinophil Rel %   Date Value Ref Range Status   07/16/2020 1.9 0.3 - 6.2 % Final     Basophil Rel %   Date Value Ref Range Status   07/16/2020 0.8 0.0 - 1.5 % Final     Neutrophils Absolute   Date Value Ref Range Status   07/16/2020 5.46 1.70 - 7.00 10*3/mm3 Final     Lymphocytes Absolute   Date Value Ref Range Status   07/16/2020 2.75 0.70 - 3.10 10*3/mm3 Final     Monocytes Absolute   Date Value Ref Range Status   07/16/2020 0.68 0.10 - 0.90 10*3/mm3 Final     Eosinophils Absolute   Date Value Ref Range Status   07/16/2020 0.17 0.00 - 0.40 10*3/mm3 Final     Basophils Absolute   Date Value Ref Range Status   07/16/2020 0.07 0.00 - 0.20 10*3/mm3 Final     nRBC   Date Value Ref Range Status   07/16/2020 0.0 0.0 - 0.2 /100 WBC Final       Lab Results   Component Value Date    GLUCOSE 99 12/14/2018    BUN 8 07/16/2020    CREATININE 0.94 07/16/2020    EGFRIFNONA 60 (L) 07/16/2020    EGFRIFAFRI 72 07/16/2020    BCR 8.5 07/16/2020    CO2 29.0 07/16/2020    CALCIUM 9.6 07/16/2020    PROTENTOTREF 6.7 07/16/2020    ALBUMIN 4.70 07/16/2020    LABIL2 2.4 07/16/2020    AST 15 07/16/2020    ALT 21 07/16/2020         Imaging Results (Last 7 Days)     ** No results found for the last 168 hours. **             Assessment/Plan    GERD: Persistent issue despite twice daily high-dose PPI.  No recent EGD    Nausea: With above, mildly severe    Tobacco abuse: This can worsen GERD symptoms    Plan  Proceed with EGD for further evaluation of symptoms.  Further recommendations to be made after her scope  Continue PPI in the meantime  Advised continued efforts to quit smoking    Nelsy was seen today for heartburn.    Diagnoses and all orders for this visit:    Gastroesophageal reflux disease, esophagitis presence not specified  -     Case Request; Standing  -     Follow Anesthesia Guidelines / Standing Orders; Future  -     Obtain Informed Consent; Future  -     Case Request    Nausea  -     Case Request; Standing  -     Follow Anesthesia Guidelines / Standing Orders; Future  -     Obtain Informed Consent; Future  -     Case Request    Tobacco abuse        Dictated utilizing Dragon dictation

## 2020-10-05 ENCOUNTER — TRANSCRIBE ORDERS (OUTPATIENT)
Dept: SLEEP MEDICINE | Facility: HOSPITAL | Age: 65
End: 2020-10-05

## 2020-10-05 DIAGNOSIS — Z01.818 OTHER SPECIFIED PRE-OPERATIVE EXAMINATION: Primary | ICD-10-CM

## 2020-10-07 ENCOUNTER — LAB (OUTPATIENT)
Dept: LAB | Facility: HOSPITAL | Age: 65
End: 2020-10-07

## 2020-10-07 DIAGNOSIS — Z01.818 OTHER SPECIFIED PRE-OPERATIVE EXAMINATION: ICD-10-CM

## 2020-10-07 PROCEDURE — C9803 HOPD COVID-19 SPEC COLLECT: HCPCS

## 2020-10-07 PROCEDURE — U0004 COV-19 TEST NON-CDC HGH THRU: HCPCS

## 2020-10-08 LAB — SARS-COV-2 RNA RESP QL NAA+PROBE: NOT DETECTED

## 2020-10-09 ENCOUNTER — ANESTHESIA (OUTPATIENT)
Dept: GASTROENTEROLOGY | Facility: HOSPITAL | Age: 65
End: 2020-10-09

## 2020-10-09 ENCOUNTER — ANESTHESIA EVENT (OUTPATIENT)
Dept: GASTROENTEROLOGY | Facility: HOSPITAL | Age: 65
End: 2020-10-09

## 2020-10-09 ENCOUNTER — HOSPITAL ENCOUNTER (OUTPATIENT)
Facility: HOSPITAL | Age: 65
Setting detail: HOSPITAL OUTPATIENT SURGERY
Discharge: HOME | End: 2020-10-09
Attending: INTERNAL MEDICINE | Admitting: INTERNAL MEDICINE

## 2020-10-09 VITALS
DIASTOLIC BLOOD PRESSURE: 59 MMHG | HEIGHT: 68 IN | OXYGEN SATURATION: 98 % | HEART RATE: 68 BPM | WEIGHT: 165 LBS | SYSTOLIC BLOOD PRESSURE: 118 MMHG | BODY MASS INDEX: 25.01 KG/M2 | RESPIRATION RATE: 16 BRPM

## 2020-10-09 DIAGNOSIS — R11.0 NAUSEA: ICD-10-CM

## 2020-10-09 DIAGNOSIS — K21.9 GASTROESOPHAGEAL REFLUX DISEASE: ICD-10-CM

## 2020-10-09 DIAGNOSIS — K21.9 GASTROESOPHAGEAL REFLUX DISEASE, ESOPHAGITIS PRESENCE NOT SPECIFIED: ICD-10-CM

## 2020-10-09 PROCEDURE — 43239 EGD BIOPSY SINGLE/MULTIPLE: CPT | Performed by: INTERNAL MEDICINE

## 2020-10-09 PROCEDURE — S0260 H&P FOR SURGERY: HCPCS | Performed by: INTERNAL MEDICINE

## 2020-10-09 PROCEDURE — 25010000002 PROPOFOL 10 MG/ML EMULSION: Performed by: NURSE ANESTHETIST, CERTIFIED REGISTERED

## 2020-10-09 PROCEDURE — 88305 TISSUE EXAM BY PATHOLOGIST: CPT | Performed by: INTERNAL MEDICINE

## 2020-10-09 RX ORDER — SUCRALFATE ORAL 1 G/10ML
1 SUSPENSION ORAL
Qty: 420 ML | Refills: 3 | Status: SHIPPED | OUTPATIENT
Start: 2020-10-09 | End: 2021-01-20 | Stop reason: SDDI

## 2020-10-09 RX ORDER — PROPOFOL 10 MG/ML
VIAL (ML) INTRAVENOUS CONTINUOUS PRN
Status: DISCONTINUED | OUTPATIENT
Start: 2020-10-09 | End: 2020-10-09 | Stop reason: SURG

## 2020-10-09 RX ORDER — ONDANSETRON 2 MG/ML
4 INJECTION INTRAMUSCULAR; INTRAVENOUS ONCE AS NEEDED
Status: DISCONTINUED | OUTPATIENT
Start: 2020-10-09 | End: 2020-10-09 | Stop reason: HOSPADM

## 2020-10-09 RX ORDER — LIDOCAINE HYDROCHLORIDE 20 MG/ML
INJECTION, SOLUTION INFILTRATION; PERINEURAL AS NEEDED
Status: DISCONTINUED | OUTPATIENT
Start: 2020-10-09 | End: 2020-10-09 | Stop reason: SURG

## 2020-10-09 RX ORDER — PROMETHAZINE HYDROCHLORIDE 25 MG/1
25 TABLET ORAL ONCE AS NEEDED
Status: DISCONTINUED | OUTPATIENT
Start: 2020-10-09 | End: 2020-10-09 | Stop reason: HOSPADM

## 2020-10-09 RX ORDER — PROMETHAZINE HYDROCHLORIDE 25 MG/1
25 SUPPOSITORY RECTAL ONCE AS NEEDED
Status: DISCONTINUED | OUTPATIENT
Start: 2020-10-09 | End: 2020-10-09 | Stop reason: HOSPADM

## 2020-10-09 RX ORDER — PROPOFOL 10 MG/ML
VIAL (ML) INTRAVENOUS AS NEEDED
Status: DISCONTINUED | OUTPATIENT
Start: 2020-10-09 | End: 2020-10-09 | Stop reason: SURG

## 2020-10-09 RX ORDER — SODIUM CHLORIDE, SODIUM LACTATE, POTASSIUM CHLORIDE, CALCIUM CHLORIDE 600; 310; 30; 20 MG/100ML; MG/100ML; MG/100ML; MG/100ML
30 INJECTION, SOLUTION INTRAVENOUS CONTINUOUS
Status: DISCONTINUED | OUTPATIENT
Start: 2020-10-09 | End: 2020-10-09 | Stop reason: HOSPADM

## 2020-10-09 RX ADMIN — PROPOFOL 30 MG: 10 INJECTION, EMULSION INTRAVENOUS at 14:09

## 2020-10-09 RX ADMIN — PROPOFOL 200 MCG/KG/MIN: 10 INJECTION, EMULSION INTRAVENOUS at 14:09

## 2020-10-09 RX ADMIN — LIDOCAINE HYDROCHLORIDE 40 MG: 20 INJECTION, SOLUTION INFILTRATION; PERINEURAL at 14:09

## 2020-10-09 RX ADMIN — SODIUM CHLORIDE, POTASSIUM CHLORIDE, SODIUM LACTATE AND CALCIUM CHLORIDE 30 ML/HR: 600; 310; 30; 20 INJECTION, SOLUTION INTRAVENOUS at 13:35

## 2020-10-09 NOTE — H&P
[facility]  Pre Procedure History & Physical    Chief Complaint: GERD symptoms persistent despite high-dose PPI      HPI: 65 y.o. female who presents for evaluation of heartburn.  Symptoms present for a year or more.  Describes burning acid, mostly at her epigastric region with radiation into her esophagus.  Occurs after most meals, worse after dinner.  Can be associated with nausea at times but no vomiting.  Persists despite twice daily ppi.  Rare dysphagia.  No globus.  No recent EGD.  No excessive NSAIDs     Maternal aunt with CRC     Colonoscopy 11/2018, 9mm rectal polyp removed     Smokes 1ppd, trying to quit.  No ETOH     Retired from [employer]     Past Medical History:   Past Medical History:   Diagnosis Date   • Acid reflux    • Anxiety    • Aortic atherosclerosis (CMS/HCC) 10/23/2018   • Bradycardia    • Carotid artery disease (CMS/HCC)    • Carotid artery stenosis    • Chest pain     NORMAL STRESS TEST 10/23/18   • Claudication (CMS/HCC)    • History of colon polyps    • Hyperlipidemia    • Kidney stone on left side    • Osteopenia    • Osteoporosis    • Palpitations    • PONV (postoperative nausea and vomiting)    • PVD (peripheral vascular disease) (CMS/HCC)    • Subclavian artery stenosis (CMS/HCC)    • Tobacco abuse        Family History:  Family History   Problem Relation Age of Onset   • Hypertension Father    • Lung disease Father    • Heart attack Father 62   • Anesthesia problems Father    • Heart failure Father 88   • Aneurysm Father         Abdominal Aortic Aneurysm   • Cancer Father    • COPD Father    • Atrial fibrillation Father    • Cancer Sister    • Heart disease Mother 83   • Hypertension Mother    • Atrial fibrillation Mother    • Malig Hyperthermia Neg Hx        Social History:   reports that she has been smoking cigarettes. She has a 45.00 pack-year smoking history. She has never used smokeless tobacco. She reports current alcohol use. She reports that she does  not use drugs.    Medications:   No medications prior to admission.       Allergies:  Pitavastatin    ROS:    Pertinent items are noted in HPI     Objective     not currently breastfeeding.    Physical Exam   Constitutional: Pt is oriented to person, place, and time and well-developed, well-nourished, and in no distress.   HENT:   Mouth/Throat: Oropharynx is clear and moist.   Neck: Normal range of motion. Neck supple.   Cardiovascular: Normal rate, regular rhythm and normal heart sounds.    Pulmonary/Chest: Effort normal and breath sounds normal. No respiratory distress. No  wheezes.   Abdominal: Soft. Bowel sounds are normal.   Skin: Skin is warm and dry.   Psychiatric: Mood, memory, affect and judgment normal.     Assessment/Plan     Diagnosis: GERD symptoms persistent despite high-dose PPI      Anticipated Surgical Procedure:  EGD      The risks, benefits, and alternatives of this procedure have been discussed with the patient or the responsible party- the patient understands and agrees to proceed.

## 2020-10-09 NOTE — ANESTHESIA POSTPROCEDURE EVALUATION
"Patient: Nelsy Stokes    Procedure Summary     Date: 10/09/20 Room / Location:  [facility]    Anesthesia Start: 1401 Anesthesia Stop: 1427    Procedure: ESOPHAGOGASTRODUODENOSCOPY WITH COLD BIOPSIES (N/A Esophagus) Diagnosis:       Gastroesophageal reflux disease, esophagitis presence not specified      Nausea      (Gastroesophageal reflux disease, esophagitis presence not specified [K21.9])      (Nausea [R11.0])    Surgeon: Jacque Huber MD Provider: Anel Yao MD    Anesthesia Type: MAC ASA Status: 3          Anesthesia Type: MAC    Vitals  Vitals Value Taken Time   BP 96/47 10/09/20 1427   Temp     Pulse 60 10/09/20 1427   Resp 16 10/09/20 1427   SpO2 99 % 10/09/20 1427           Post Anesthesia Care and Evaluation    Patient location during evaluation: PACU  Patient participation: complete - patient participated  Level of consciousness: awake and alert  Pain management: adequate  Airway patency: patent  Anesthetic complications: No anesthetic complications    Cardiovascular status: acceptable  Respiratory status: acceptable  Hydration status: acceptable    Comments: BP 96/47 (BP Location: Left arm, Patient Position: Lying)   Pulse 60   Resp 16   Ht 172.7 cm (68\")   Wt 74.8 kg (165 lb)   LMP  (LMP Unknown)   SpO2 99%   BMI 25.09 kg/m²         "

## 2020-10-11 DIAGNOSIS — R93.5 ABNORMAL CT OF THE ABDOMEN: ICD-10-CM

## 2020-10-11 DIAGNOSIS — R10.11 RUQ ABDOMINAL PAIN: Primary | ICD-10-CM

## 2020-10-11 DIAGNOSIS — R11.0 NAUSEA: ICD-10-CM

## 2020-10-11 DIAGNOSIS — R94.8 ABNORMAL BILIARY HIDA SCAN: ICD-10-CM

## 2020-10-12 ENCOUNTER — RESULTS ENCOUNTER (OUTPATIENT)
Dept: FAMILY MEDICINE CLINIC | Facility: CLINIC | Age: 65
End: 2020-10-12

## 2020-10-12 ENCOUNTER — TELEPHONE (OUTPATIENT)
Dept: GASTROENTEROLOGY | Facility: CLINIC | Age: 65
End: 2020-10-12

## 2020-10-12 DIAGNOSIS — E53.8 LOW FOLIC ACID: ICD-10-CM

## 2020-10-12 DIAGNOSIS — I70.0 AORTIC ATHEROSCLEROSIS (HCC): Chronic | ICD-10-CM

## 2020-10-12 DIAGNOSIS — E78.2 MIXED HYPERLIPIDEMIA: ICD-10-CM

## 2020-10-12 DIAGNOSIS — K21.00 GASTROESOPHAGEAL REFLUX DISEASE WITH ESOPHAGITIS: ICD-10-CM

## 2020-10-12 DIAGNOSIS — E55.9 VITAMIN D DEFICIENCY: ICD-10-CM

## 2020-10-12 DIAGNOSIS — E53.8 LOW SERUM VITAMIN B12: ICD-10-CM

## 2020-10-12 DIAGNOSIS — I73.9 PAD (PERIPHERAL ARTERY DISEASE) (HCC): Chronic | ICD-10-CM

## 2020-10-12 DIAGNOSIS — I65.23 BILATERAL CAROTID ARTERY STENOSIS: Chronic | ICD-10-CM

## 2020-10-12 DIAGNOSIS — R73.01 IMPAIRED FASTING GLUCOSE: ICD-10-CM

## 2020-10-12 DIAGNOSIS — Z72.0 TOBACCO ABUSE: Chronic | ICD-10-CM

## 2020-10-12 DIAGNOSIS — I10 ESSENTIAL HYPERTENSION: ICD-10-CM

## 2020-10-12 LAB
LAB AP CASE REPORT: NORMAL
PATH REPORT.FINAL DX SPEC: NORMAL
PATH REPORT.GROSS SPEC: NORMAL

## 2020-10-12 NOTE — TELEPHONE ENCOUNTER
----- Message from Niesha Preciado sent at 10/9/2020  4:16 PM EDT -----  Regarding: meds  Contact: [phone]  Pt says she was suppose to have 2 medications called in but only had one.

## 2020-10-12 NOTE — TELEPHONE ENCOUNTER
"See egd op note of 10/9. \"continue pantoprazole twice daily, start carafate\"  See pantoprazole 2x/day rx of 7/20 with #180, R1.     VM to pt with request to contact office.   "

## 2020-10-12 NOTE — PROGRESS NOTES
Biopsies from her EGD show chronic inflammation of the stomach body.  If she is taking ibuprofen, Aleve, high doses of aspirin on a regular basis, she needs to decrease this.    Additionally, active chronic inflammation of the GE junction.  No evidence of Barraza's esophagus nor precancerous changes    Continue the pantoprazole and Carafate.    I do think further work-up of the gallbladder is reasonable as per discussion with Gretchen King.

## 2020-10-16 ENCOUNTER — HOSPITAL ENCOUNTER (OUTPATIENT)
Dept: NUCLEAR MEDICINE | Facility: HOSPITAL | Age: 65
Discharge: HOME | End: 2020-10-16

## 2020-10-16 DIAGNOSIS — R93.5 ABNORMAL CT OF THE ABDOMEN: ICD-10-CM

## 2020-10-16 DIAGNOSIS — R10.11 RUQ ABDOMINAL PAIN: ICD-10-CM

## 2020-10-16 DIAGNOSIS — R11.0 NAUSEA: ICD-10-CM

## 2020-10-16 PROCEDURE — 25010000002 SINCALIDE PER 5 MCG: Performed by: PHYSICIAN ASSISTANT

## 2020-10-16 PROCEDURE — A9537 TC99M MEBROFENIN: HCPCS | Performed by: PHYSICIAN ASSISTANT

## 2020-10-16 PROCEDURE — 0 TECHNETIUM TC 99M MEBROFENIN KIT: Performed by: PHYSICIAN ASSISTANT

## 2020-10-16 PROCEDURE — 78227 HEPATOBIL SYST IMAGE W/DRUG: CPT

## 2020-10-16 RX ORDER — KIT FOR THE PREPARATION OF TECHNETIUM TC 99M MEBROFENIN 45 MG/10ML
1 INJECTION, POWDER, LYOPHILIZED, FOR SOLUTION INTRAVENOUS
Status: COMPLETED | OUTPATIENT
Start: 2020-10-16 | End: 2020-10-16

## 2020-10-16 RX ADMIN — SINCALIDE 1.5 MCG: 5 INJECTION, POWDER, LYOPHILIZED, FOR SOLUTION INTRAVENOUS at 08:15

## 2020-10-16 RX ADMIN — MEBROFENIN 1 DOSE: 45 INJECTION, POWDER, LYOPHILIZED, FOR SOLUTION INTRAVENOUS at 07:08

## 2020-10-27 ENCOUNTER — OFFICE VISIT (OUTPATIENT)
Dept: SURGERY | Facility: CLINIC | Age: 65
End: 2020-10-27

## 2020-10-27 VITALS — BODY MASS INDEX: 25.61 KG/M2 | WEIGHT: 169 LBS | HEIGHT: 68 IN

## 2020-10-27 DIAGNOSIS — K42.9 UMBILICAL HERNIA WITHOUT OBSTRUCTION AND WITHOUT GANGRENE: ICD-10-CM

## 2020-10-27 DIAGNOSIS — K82.8 BILIARY DYSKINESIA: Primary | ICD-10-CM

## 2020-10-27 PROCEDURE — 99203 OFFICE O/P NEW LOW 30 MIN: CPT | Performed by: SURGERY

## 2020-10-27 RX ORDER — CEFDINIR 300 MG/1
300 CAPSULE ORAL DAILY
COMMUNITY
Start: 2020-10-21 | End: 2020-11-12

## 2020-10-27 RX ORDER — PREDNISONE 10 MG/1
10 TABLET ORAL DAILY
COMMUNITY
Start: 2020-10-21 | End: 2020-11-12

## 2020-10-27 RX ORDER — CEFAZOLIN SODIUM 2 G/100ML
2 INJECTION, SOLUTION INTRAVENOUS ONCE
Status: CANCELLED | OUTPATIENT
Start: 2020-11-18 | End: 2020-10-27

## 2020-10-28 NOTE — PROGRESS NOTES
General Surgery  Initial Office Visit    CC: Heartburn, abnormal gallbladder imaging    HPI: The patient is a pleasant 65 y.o. year-old lady who presents today for evaluation of recent abnormal gallbladder imaging.  She had a CT abdomen/pelvis done by [facility] on 10/5/2020 due to recent kidney stones.  She had undergone lithotripsy and the CT scan was to follow-up on some mild left flank pain she was still experiencing.  The CT noted a contracted gallbladder with wall thickening, sludge, and questionable gallstones.  Her PCP, Gretchen King, then ordered a HIDA scan for further work-up of the gallbladder which found a low gallbladder ejection fraction of only 9%.  She was then referred to see me to discuss cholecystectomy.  She has had longstanding heartburn presumably due to GERD.  She was referred to Dr. Jacque Huber with gastroenterology who recently performed an EGD on 10/9/2020 which showed gastritis, distal esophagitis, but no signs of peptic ulcer disease.  Biopsies of the lower esophagus showed no signs of metaplasia or dysplasia ruling out Barraza's esophagus, but antral biopsies did demonstrate mild gastritis.  She has troubled with chronic nausea and intermittent bilateral upper quadrant abdominal pain with radiation into the bilateral back.  She also reports chronic abdominal bloating.  She has never really noticed any postprandial right upper quadrant abdominal pain.    Past Medical History:   Hypertension  Hyperlipidemia  GERD  Asthma  Carotid artery stenosis bilaterally    Past Surgical History:   EGD (10/9/2020, Dr. Huber - antral gastritis, distal esophagitis)  Colonoscopy (2017)  Right knee surgery  Right carotid stent  Lithotripsy    Medications:   Albuterol inhaler as needed for shortness of breath  Aspirin 81 mg daily  Ezetimibe 10 mg daily  Fluticasone nasal spray twice daily  Folic acid 1 mg daily  Protonix 40 mg daily  Rosuvastatin 5 mg twice weekly  Spiriva 2.5 mcg twice daily  Valsartan  320 mg daily  Vitamin B12 1000 mcg daily  Carafate 1 g 3 times daily    Allergies: Pitavastatin (myalgias)    Family History: 2 sisters and her father all had gallbladder disease requiring cholecystectomy    Social History: , retired, smokes 1 pack/day cigarettes for many years, no regular alcohol use    ROS:   Constitutional: Positive for unexpected weight gain; negative for fevers or chills  HENT: Negative for hearing loss or runny nose  Eyes: Negative for vision changes or scleral icterus  Respiratory: Positive for cough; negative for wheezing or shortness of breath  Cardiovascular: Negative for chest pain or heart palpitations  Gastrointestinal: Positive for reflux; negative for abdominal pain, nausea, vomiting, constipation, melena, or hematochezia  Genitourinary: Negative for hematuria or dysuria  Musculoskeletal: Negative for joint swelling or gait instability  Neurologic: Negative for tremors or seizures  Psychiatric: Negative for suicidal ideations or depression  All other systems reviewed and negative    Physical Exam:  Height: 172 cm  Weight: 76 kg  BMI: 25  General: No acute distress, well-nourished & well-developed  HEAD: normocephalic, atraumatic  EYES: normal conjunctiva, sclera anicteric  EARS: grossly normal hearing  NECK: supple, no thyromegaly  CARDIOVASCULAR: regular rate and rhythm  RESPIRATORY: clear to auscultation bilaterally  GASTROINTESTINAL: soft, nontender, non-distended, very small palpable reducible umbilical hernia at the base of the navel  MUSCULOSKELETAL: normal gait and station. No gross extremity abnormalities  PSYCHIATRIC: oriented x3, normal mood and affect    IMAGING:  HIDA SCAN (10/16/2020):  FINDINGS: The anterior 30 and 60 minute images show normal activity in the gallbladder, liver, biliary tree, and bowel. Gallbladder ejection fraction measures 9%.  Normal range is considered 35% or greater.   IMPRESSION:  There is no evidence of biliary obstruction but the  gallbladder ejection fraction is abnormally low, suggesting gallbladder dysfunction.    CT ABD/PELVIS ([facility] office, 10/5/2020):  IMPRESSION:  1.  Stable calcification in the left kidney which may be atherosclerotic or a nonobstructing 2 mm stone.  Additional calcifications at the left renal hilar region are atherosclerotic.  2.  Mild asymmetric right perinephric stranding, increased compared to the prior exam.  3.  Unremarkable noncontrasted CT appearance of the urinary bladder.  4.  Small volume nonspecific pelvic ascites.  5.  Nonspecific wall thickening of the partially contracted gallbladder with possible sludge or stones.    ASSESSMENT & PLAN  Mrs. Stokes is a 65-year-old lady with newly diagnosed biliary dyskinesia and some gallbladder wall thickening suggesting chronic cholecystitis.  I reviewed the CT report from [facility] visit earlier this month and also reviewed the HIDA scan personally.  I discussed the findings with her today which demonstrate signs of biliary dyskinesia with a low gallbladder ejection fraction of only 9%.  I would recommend we proceed with laparoscopic cholecystectomy with intraoperative cholangiogram, as some of her acid reflux/heartburn symptoms may be related to her dysfunctional gallbladder.  She has a tiny umbilical hernia that I would be happy to fix at the same time.  I also reviewed her recent endoscopy report from EGD with Dr. Jacque Huber, and reviewed the pathology findings.  She does have some degree of gastritis and esophagitis, which are unrelated to her gallbladder.  I therefore counseled her that she will not be able to come off all of her medicines (Protonix and Carafate), but she may notice significant symptomatic improvement after surgery.  She understands the risks of the procedure include bleeding, possible common bile duct injury, and possible postoperative bile leak.  Despite these risks, she has consented to proceed and we have scheduled  her surgery for 11/18/2020.    Maria D Ariza MD  General and Endoscopic Surgery  [facility]    [address], [address]  [place], [place], [address]  P: [phone]  F: [phone]

## 2020-11-09 ENCOUNTER — TRANSCRIBE ORDERS (OUTPATIENT)
Dept: PREADMISSION TESTING | Facility: HOSPITAL | Age: 65
End: 2020-11-09

## 2020-11-09 DIAGNOSIS — Z01.818 OTHER SPECIFIED PRE-OPERATIVE EXAMINATION: Primary | ICD-10-CM

## 2020-11-12 ENCOUNTER — APPOINTMENT (OUTPATIENT)
Dept: PREADMISSION TESTING | Facility: HOSPITAL | Age: 65
End: 2020-11-12

## 2020-11-12 ENCOUNTER — APPOINTMENT (OUTPATIENT)
Dept: LAB | Facility: HOSPITAL | Age: 65
End: 2020-11-12

## 2020-11-12 VITALS
HEIGHT: 68 IN | RESPIRATION RATE: 18 BRPM | TEMPERATURE: 99.3 F | BODY MASS INDEX: 25.69 KG/M2 | OXYGEN SATURATION: 98 % | SYSTOLIC BLOOD PRESSURE: 168 MMHG | WEIGHT: 169.5 LBS | DIASTOLIC BLOOD PRESSURE: 92 MMHG | HEART RATE: 72 BPM

## 2020-11-12 LAB
ALBUMIN SERPL-MCNC: 4.3 G/DL (ref 3.5–5.2)
ALBUMIN/GLOB SERPL: 1.7 G/DL
ALP SERPL-CCNC: 84 U/L (ref 39–117)
ALT SERPL W P-5'-P-CCNC: 22 U/L (ref 1–33)
ANION GAP SERPL CALCULATED.3IONS-SCNC: 8.4 MMOL/L (ref 5–15)
AST SERPL-CCNC: 21 U/L (ref 1–32)
BASOPHILS # BLD AUTO: 0.05 10*3/MM3 (ref 0–0.2)
BASOPHILS NFR BLD AUTO: 0.8 % (ref 0–1.5)
BILIRUB SERPL-MCNC: 0.4 MG/DL (ref 0–1.2)
BUN SERPL-MCNC: 8 MG/DL (ref 8–23)
BUN/CREAT SERPL: 8.8 (ref 7–25)
CALCIUM SPEC-SCNC: 8.9 MG/DL (ref 8.6–10.5)
CHLORIDE SERPL-SCNC: 103 MMOL/L (ref 98–107)
CO2 SERPL-SCNC: 27.6 MMOL/L (ref 22–29)
CREAT SERPL-MCNC: 0.91 MG/DL (ref 0.57–1)
DEPRECATED RDW RBC AUTO: 42.4 FL (ref 37–54)
EOSINOPHIL # BLD AUTO: 0.19 10*3/MM3 (ref 0–0.4)
EOSINOPHIL NFR BLD AUTO: 3 % (ref 0.3–6.2)
ERYTHROCYTE [DISTWIDTH] IN BLOOD BY AUTOMATED COUNT: 12.8 % (ref 12.3–15.4)
GFR SERPL CREATININE-BSD FRML MDRD: 62 ML/MIN/1.73
GLOBULIN UR ELPH-MCNC: 2.5 GM/DL
GLUCOSE SERPL-MCNC: 99 MG/DL (ref 65–99)
HCT VFR BLD AUTO: 45.5 % (ref 34–46.6)
HGB BLD-MCNC: 15.4 G/DL (ref 12–15.9)
IMM GRANULOCYTES # BLD AUTO: 0.01 10*3/MM3 (ref 0–0.05)
IMM GRANULOCYTES NFR BLD AUTO: 0.2 % (ref 0–0.5)
LYMPHOCYTES # BLD AUTO: 2.59 10*3/MM3 (ref 0.7–3.1)
LYMPHOCYTES NFR BLD AUTO: 40.9 % (ref 19.6–45.3)
MCH RBC QN AUTO: 30.9 PG (ref 26.6–33)
MCHC RBC AUTO-ENTMCNC: 33.8 G/DL (ref 31.5–35.7)
MCV RBC AUTO: 91.4 FL (ref 79–97)
MONOCYTES # BLD AUTO: 0.54 10*3/MM3 (ref 0.1–0.9)
MONOCYTES NFR BLD AUTO: 8.5 % (ref 5–12)
NEUTROPHILS NFR BLD AUTO: 2.95 10*3/MM3 (ref 1.7–7)
NEUTROPHILS NFR BLD AUTO: 46.6 % (ref 42.7–76)
NRBC BLD AUTO-RTO: 0 /100 WBC (ref 0–0.2)
PLATELET # BLD AUTO: 320 10*3/MM3 (ref 140–450)
PMV BLD AUTO: 9 FL (ref 6–12)
POTASSIUM SERPL-SCNC: 4.2 MMOL/L (ref 3.5–5.2)
PROT SERPL-MCNC: 6.8 G/DL (ref 6–8.5)
RBC # BLD AUTO: 4.98 10*6/MM3 (ref 3.77–5.28)
SODIUM SERPL-SCNC: 139 MMOL/L (ref 136–145)
WBC # BLD AUTO: 6.33 10*3/MM3 (ref 3.4–10.8)

## 2020-11-12 PROCEDURE — 85025 COMPLETE CBC W/AUTO DIFF WBC: CPT | Performed by: SURGERY

## 2020-11-12 PROCEDURE — 80053 COMPREHEN METABOLIC PANEL: CPT | Performed by: SURGERY

## 2020-11-12 PROCEDURE — 36415 COLL VENOUS BLD VENIPUNCTURE: CPT | Performed by: SURGERY

## 2020-11-12 NOTE — DISCHARGE INSTRUCTIONS
Take the following medications the morning of surgery:  Advair,spiriva and pantoprazole  Bring rescue inhaler  If you are on prescription narcotic pain medication to control your pain you may also take that medication the morning of surgery.    General Instructions: clear liquids until 7:30 am morning of surgery  • Do not eat solid food after midnight the night before surgery.  • You may drink clear liquids day of surgery but must stop at least one hour before your hospital arrival time.  • It is beneficial for you to have a clear drink that contains carbohydrates the day of surgery.  We suggest a 12 to 20 ounce bottle of Gatorade or Powerade for non-diabetic patients or a 12 to 20 ounce bottle of G2 or Powerade Zero for diabetic patients. (Pediatric patients, are not advised to drink a 12 to 20 ounce carbohydrate drink)    Clear liquids are liquids you can see through.  Nothing red in color.     Plain water                               Sports drinks  Sodas                                   Gelatin (Jell-O)  Fruit juices without pulp such as white grape juice and apple juice  Popsicles that contain no fruit or yogurt  Tea or coffee (no cream or milk added)  Gatorade / Powerade  G2 / Powerade Zero    • Infants may have breast milk up to four hours before surgery.  • Infants drinking formula may drink formula up to six hours before surgery.   • Patients who avoid smoking, chewing tobacco and alcohol for 4 weeks prior to surgery have a reduced risk of post-operative complications.  Quit smoking as many days before surgery as you can.  • Do not smoke, use chewing tobacco or drink alcohol the day of surgery.   • If applicable bring your C-PAP/ BI-PAP machine.  • Bring any papers given to you in the doctor’s office.  • Wear clean comfortable clothes.  • Do not wear contact lenses, false eyelashes or make-up.  Bring a case for your glasses.   • Bring crutches or walker if applicable.  • Remove all piercings.  Leave  jewelry and any other valuables at home.  • Hair extensions with metal clips must be removed prior to surgery.  • The Pre-Admission Testing nurse will instruct you to bring medications if unable to obtain an accurate list in Pre-Admission Testing.        If you were given a blood bank ID arm band remember to bring it with you the day of surgery.    Preventing a Surgical Site Infection:  • For 2 to 3 days before surgery, avoid shaving with a razor because the razor can irritate skin and make it easier to develop an infection.    • Any areas of open skin can increase the risk of a post-operative wound infection by allowing bacteria to enter and travel throughout the body.  Notify your surgeon if you have any skin wounds / rashes even if it is not near the expected surgical site.  The area will need assessed to determine if surgery should be delayed until it is healed.  • The night prior to surgery shower using a fresh bar of anti-bacterial soap (such as Dial) and clean washcloth.  Sleep in a clean bed with clean clothing.  Do not allow pets to sleep with you.  • Shower on the morning of surgery using a fresh bar of anti-bacterial soap (such as Dial) and clean washcloth.  Dry with a clean towel and dress in clean clothing.  • Ask your surgeon if you will be receiving antibiotics prior to surgery.  • Make sure you, your family, and all healthcare providers clean their hands with soap and water or an alcohol based hand  before caring for you or your wound.    Day of surgery: 11/18/2020 arrival time 8:30 am  Your arrival time is approximately two hours before your scheduled surgery time.  Upon arrival, a Pre-op nurse and Anesthesiologist will review your health history, obtain vital signs, and answer questions you may have.  The only belongings needed at this time will be a list of your home medications and if applicable your C-PAP/BI-PAP machine.  If you are staying overnight your family can leave the rest of  your belongings in the car and bring them to your room later.  A Pre-op nurse will start an IV and you may receive medication in preparation for surgery, including something to help you relax.  While you are in surgery your family should notify the waiting room  if they leave the waiting room area and provide a contact phone number.    Please be aware that surgery does come with discomfort.  We want to make every effort to control your discomfort so please discuss any uncontrolled symptoms with your nurse.   Your doctor will most likely have prescribed pain medications.      If you are going home after surgery you will receive individualized written care instructions before being discharged.  A responsible adult must drive you to and from the hospital on the day of your surgery and stay with you for 24 hours.    If you are staying overnight following surgery, you will be transported to your hospital room following the recovery period.  [facility] has all private rooms.    If you have any questions please call Pre-Admission Testing at [phone].  Deductibles and co-payments are collected on the day of service. Please be prepared to pay the required co-pay, deductible or deposit on the day of service as defined by your plan.    Patient Education for Self-Quarantine Process    Following your COVID testing, we strongly recommend that you do not leave your home after you have been tested for COVID except to get medical care. This includes not going to work, school or to public areas.  If this is not possible for you to do please limit your activities to only required outings.  Be sure to wear a mask when you are with other people, practice social distancing and wash your hands frequently.      The following items provide additional details to keep you safe.  • Wash your hands with soap and water frequently for at least 20 seconds.   • Avoid touching your eyes, nose and mouth with unwashed  hands.  • Do not share anything - utensils, towels, food from the same bowl.   • Have your own utensils, drinking glass, dishes, towels and bedding.   • Do not have visitors.   • Do use FaceTime to stay in touch with family and friends.  • You should stay in a specific room away from others if possible.   • Stay at least 6 feet away from others in the home if you cannot have a dedicated room to yourself.   • Do not snuggle with your pet. While the CDC says there is no evidence that pets can spread COVID-19 or be infected from humans, it is probably best to avoid “petting, snuggling, being kissed or licked and sharing food (during self-quarantine)”, according to the CDC.   • Sanitize household surfaces daily. Include all high touch areas (door handles, light switches, phones, countertops, etc.)  • Do not share a bathroom with others, if possible.   • Wear a mask around others in your home if you are unable to stay in a separate room or 6 feet apart. If  you are unable to wear a mask, have your family member wear a mask if they must be within 6 feet of you.   Call your surgeon immediately if you experience any of the following symptoms:  • Sore Throat  • Shortness of Breath or difficulty breathing  • Cough  • Chills  • Body soreness or muscle pain  • Headache  • Fever  • New loss of taste or smell  • Do not arrive for your surgery ill.  Your procedure will need to be rescheduled to another time.  You will need to call your physician before the day of surgery to avoid any unnecessary exposure to hospital staff as well as other patients.    CHLORHEXIDINE CLOTH INSTRUCTIONS  The morning of surgery follow these instructions using the Chlorhexidine cloths you've been given.  These steps reduce bacteria on the body.  Do not use the cloths near your eyes, ears mouth, genitalia or on open wounds.  Throw the cloths away after use but do not try to flush them down a toilet.      • Open and remove one cloth at a time from the  package.    • Leave the cloth unfolded and begin the bathing.  • Massage the skin with the cloths using gentle pressure to remove bacteria.  Do not scrub harshly.   • Follow the steps below with one 2% CHG cloth per area (6 total cloths).  • One cloth for neck, shoulders and chest.  • One cloth for both arms, hands, fingers and underarms (do underarms last).  • One cloth for the abdomen followed by groin.  • One cloth for right leg and foot including between the toes.  • One cloth for left leg and foot including between the toes.  • The last cloth is to be used for the back of the neck, back and buttocks.    Allow the CHG to air dry 3 minutes on the skin which will give it time to work and decrease the chance of irritation.  The skin may feel sticky until it is dry.  Do not rinse with water or any other liquid or you will lose the beneficial effects of the CHG.  If mild skin irritation occurs, do rinse the skin to remove the CHG.  Report this to the nurse at time of admission.  Do not apply lotions, creams, ointments, deodorants or perfumes after using the clothes. Dress in clean clothes before coming to the hospital.

## 2020-11-16 ENCOUNTER — LAB (OUTPATIENT)
Dept: LAB | Facility: HOSPITAL | Age: 65
End: 2020-11-16

## 2020-11-16 DIAGNOSIS — Z01.818 OTHER SPECIFIED PRE-OPERATIVE EXAMINATION: ICD-10-CM

## 2020-11-16 PROCEDURE — C9803 HOPD COVID-19 SPEC COLLECT: HCPCS

## 2020-11-16 PROCEDURE — U0004 COV-19 TEST NON-CDC HGH THRU: HCPCS

## 2020-11-17 LAB — SARS-COV-2 RNA RESP QL NAA+PROBE: NOT DETECTED

## 2020-11-18 ENCOUNTER — ANESTHESIA (OUTPATIENT)
Dept: PERIOP | Facility: HOSPITAL | Age: 65
End: 2020-11-18

## 2020-11-18 ENCOUNTER — HOSPITAL ENCOUNTER (OUTPATIENT)
Facility: HOSPITAL | Age: 65
Setting detail: HOSPITAL OUTPATIENT SURGERY
Discharge: HOME | End: 2020-11-18
Attending: SURGERY | Admitting: SURGERY

## 2020-11-18 ENCOUNTER — APPOINTMENT (OUTPATIENT)
Dept: GENERAL RADIOLOGY | Facility: HOSPITAL | Age: 65
End: 2020-11-18

## 2020-11-18 ENCOUNTER — ANESTHESIA EVENT (OUTPATIENT)
Dept: PERIOP | Facility: HOSPITAL | Age: 65
End: 2020-11-18

## 2020-11-18 VITALS
RESPIRATION RATE: 16 BRPM | TEMPERATURE: 97.7 F | OXYGEN SATURATION: 93 % | HEART RATE: 84 BPM | DIASTOLIC BLOOD PRESSURE: 83 MMHG | SYSTOLIC BLOOD PRESSURE: 180 MMHG | BODY MASS INDEX: 25.08 KG/M2 | HEIGHT: 68 IN | WEIGHT: 165.5 LBS

## 2020-11-18 DIAGNOSIS — K82.8 BILIARY DYSKINESIA: Primary | ICD-10-CM

## 2020-11-18 DIAGNOSIS — K42.9 UMBILICAL HERNIA WITHOUT OBSTRUCTION AND WITHOUT GANGRENE: ICD-10-CM

## 2020-11-18 DIAGNOSIS — K80.10 CALCULUS OF GALLBLADDER WITH CHRONIC CHOLECYSTITIS WITHOUT OBSTRUCTION: ICD-10-CM

## 2020-11-18 PROCEDURE — 25010000002 ONDANSETRON PER 1 MG: Performed by: NURSE ANESTHETIST, CERTIFIED REGISTERED

## 2020-11-18 PROCEDURE — 25010000002 NEOSTIGMINE PER 0.5 MG: Performed by: NURSE ANESTHETIST, CERTIFIED REGISTERED

## 2020-11-18 PROCEDURE — 25010000002 PHENYLEPHRINE PER 1 ML: Performed by: NURSE ANESTHETIST, CERTIFIED REGISTERED

## 2020-11-18 PROCEDURE — 47563 LAPARO CHOLECYSTECTOMY/GRAPH: CPT | Performed by: SPECIALIST/TECHNOLOGIST, OTHER

## 2020-11-18 PROCEDURE — 25010000003 CEFAZOLIN IN DEXTROSE 2-4 GM/100ML-% SOLUTION: Performed by: SURGERY

## 2020-11-18 PROCEDURE — 25010000002 PROPOFOL 10 MG/ML EMULSION: Performed by: NURSE ANESTHETIST, CERTIFIED REGISTERED

## 2020-11-18 PROCEDURE — 25010000002 FENTANYL CITRATE (PF) 100 MCG/2ML SOLUTION: Performed by: NURSE ANESTHETIST, CERTIFIED REGISTERED

## 2020-11-18 PROCEDURE — 74300 X-RAY BILE DUCTS/PANCREAS: CPT

## 2020-11-18 PROCEDURE — 25010000002 DEXAMETHASONE PER 1 MG: Performed by: NURSE ANESTHETIST, CERTIFIED REGISTERED

## 2020-11-18 PROCEDURE — 88304 TISSUE EXAM BY PATHOLOGIST: CPT | Performed by: SURGERY

## 2020-11-18 PROCEDURE — 47563 LAPARO CHOLECYSTECTOMY/GRAPH: CPT | Performed by: SURGERY

## 2020-11-18 PROCEDURE — 25010000002 MIDAZOLAM PER 1 MG: Performed by: ANESTHESIOLOGY

## 2020-11-18 PROCEDURE — 25010000002 HYDROMORPHONE PER 4 MG: Performed by: NURSE ANESTHETIST, CERTIFIED REGISTERED

## 2020-11-18 PROCEDURE — 0 IOTHALAMATE 60 % SOLUTION: Performed by: SURGERY

## 2020-11-18 DEVICE — LIGAMAX 5 MM ENDOSCOPIC MULTIPLE CLIP APPLIER
Type: IMPLANTABLE DEVICE | Site: ABDOMEN | Status: FUNCTIONAL
Brand: LIGAMAX

## 2020-11-18 RX ORDER — NALOXONE HCL 0.4 MG/ML
0.2 VIAL (ML) INJECTION AS NEEDED
Status: DISCONTINUED | OUTPATIENT
Start: 2020-11-18 | End: 2020-11-18 | Stop reason: HOSPADM

## 2020-11-18 RX ORDER — SODIUM CHLORIDE 9 MG/ML
INJECTION, SOLUTION INTRAVENOUS AS NEEDED
Status: DISCONTINUED | OUTPATIENT
Start: 2020-11-18 | End: 2020-11-18 | Stop reason: HOSPADM

## 2020-11-18 RX ORDER — SODIUM CHLORIDE, SODIUM LACTATE, POTASSIUM CHLORIDE, CALCIUM CHLORIDE 600; 310; 30; 20 MG/100ML; MG/100ML; MG/100ML; MG/100ML
9 INJECTION, SOLUTION INTRAVENOUS CONTINUOUS PRN
Status: DISCONTINUED | OUTPATIENT
Start: 2020-11-18 | End: 2020-11-18 | Stop reason: HOSPADM

## 2020-11-18 RX ORDER — CEFAZOLIN SODIUM 2 G/100ML
2 INJECTION, SOLUTION INTRAVENOUS ONCE
Status: COMPLETED | OUTPATIENT
Start: 2020-11-18 | End: 2020-11-18

## 2020-11-18 RX ORDER — FENTANYL CITRATE 50 UG/ML
INJECTION, SOLUTION INTRAMUSCULAR; INTRAVENOUS AS NEEDED
Status: DISCONTINUED | OUTPATIENT
Start: 2020-11-18 | End: 2020-11-18 | Stop reason: SURG

## 2020-11-18 RX ORDER — LABETALOL HYDROCHLORIDE 5 MG/ML
5 INJECTION, SOLUTION INTRAVENOUS
Status: DISCONTINUED | OUTPATIENT
Start: 2020-11-18 | End: 2020-11-18 | Stop reason: HOSPADM

## 2020-11-18 RX ORDER — MAGNESIUM HYDROXIDE 1200 MG/15ML
LIQUID ORAL AS NEEDED
Status: DISCONTINUED | OUTPATIENT
Start: 2020-11-18 | End: 2020-11-18 | Stop reason: HOSPADM

## 2020-11-18 RX ORDER — PROMETHAZINE HYDROCHLORIDE 25 MG/1
25 TABLET ORAL ONCE AS NEEDED
Status: DISCONTINUED | OUTPATIENT
Start: 2020-11-18 | End: 2020-11-18 | Stop reason: HOSPADM

## 2020-11-18 RX ORDER — FLUMAZENIL 0.1 MG/ML
0.2 INJECTION INTRAVENOUS AS NEEDED
Status: DISCONTINUED | OUTPATIENT
Start: 2020-11-18 | End: 2020-11-18 | Stop reason: HOSPADM

## 2020-11-18 RX ORDER — ONDANSETRON 2 MG/ML
4 INJECTION INTRAMUSCULAR; INTRAVENOUS ONCE AS NEEDED
Status: COMPLETED | OUTPATIENT
Start: 2020-11-18 | End: 2020-11-18

## 2020-11-18 RX ORDER — ONDANSETRON 2 MG/ML
INJECTION INTRAMUSCULAR; INTRAVENOUS AS NEEDED
Status: DISCONTINUED | OUTPATIENT
Start: 2020-11-18 | End: 2020-11-18 | Stop reason: SURG

## 2020-11-18 RX ORDER — ALBUTEROL SULFATE 2.5 MG/3ML
2.5 SOLUTION RESPIRATORY (INHALATION) ONCE AS NEEDED
Status: DISCONTINUED | OUTPATIENT
Start: 2020-11-18 | End: 2020-11-18 | Stop reason: HOSPADM

## 2020-11-18 RX ORDER — DROPERIDOL 2.5 MG/ML
1.25 INJECTION, SOLUTION INTRAMUSCULAR; INTRAVENOUS ONCE AS NEEDED
Status: DISCONTINUED | OUTPATIENT
Start: 2020-11-18 | End: 2020-11-18 | Stop reason: HOSPADM

## 2020-11-18 RX ORDER — HYDRALAZINE HYDROCHLORIDE 20 MG/ML
5 INJECTION INTRAMUSCULAR; INTRAVENOUS
Status: DISCONTINUED | OUTPATIENT
Start: 2020-11-18 | End: 2020-11-18 | Stop reason: HOSPADM

## 2020-11-18 RX ORDER — PROPOFOL 10 MG/ML
VIAL (ML) INTRAVENOUS AS NEEDED
Status: DISCONTINUED | OUTPATIENT
Start: 2020-11-18 | End: 2020-11-18 | Stop reason: SURG

## 2020-11-18 RX ORDER — HYDROCODONE BITARTRATE AND ACETAMINOPHEN 7.5; 325 MG/1; MG/1
1 TABLET ORAL ONCE AS NEEDED
Status: COMPLETED | OUTPATIENT
Start: 2020-11-18 | End: 2020-11-18

## 2020-11-18 RX ORDER — GLYCOPYRROLATE 0.2 MG/ML
INJECTION INTRAMUSCULAR; INTRAVENOUS AS NEEDED
Status: DISCONTINUED | OUTPATIENT
Start: 2020-11-18 | End: 2020-11-18 | Stop reason: SURG

## 2020-11-18 RX ORDER — DROPERIDOL 2.5 MG/ML
0.62 INJECTION, SOLUTION INTRAMUSCULAR; INTRAVENOUS ONCE AS NEEDED
Status: DISCONTINUED | OUTPATIENT
Start: 2020-11-18 | End: 2020-11-18 | Stop reason: HOSPADM

## 2020-11-18 RX ORDER — DEXAMETHASONE SODIUM PHOSPHATE 10 MG/ML
INJECTION INTRAMUSCULAR; INTRAVENOUS AS NEEDED
Status: DISCONTINUED | OUTPATIENT
Start: 2020-11-18 | End: 2020-11-18 | Stop reason: SURG

## 2020-11-18 RX ORDER — MIDAZOLAM HYDROCHLORIDE 1 MG/ML
1 INJECTION INTRAMUSCULAR; INTRAVENOUS
Status: DISCONTINUED | OUTPATIENT
Start: 2020-11-18 | End: 2020-11-18 | Stop reason: HOSPADM

## 2020-11-18 RX ORDER — ROCURONIUM BROMIDE 10 MG/ML
INJECTION, SOLUTION INTRAVENOUS AS NEEDED
Status: DISCONTINUED | OUTPATIENT
Start: 2020-11-18 | End: 2020-11-18 | Stop reason: SURG

## 2020-11-18 RX ORDER — ACETAMINOPHEN 325 MG/1
650 TABLET ORAL ONCE AS NEEDED
Status: DISCONTINUED | OUTPATIENT
Start: 2020-11-18 | End: 2020-11-18 | Stop reason: HOSPADM

## 2020-11-18 RX ORDER — FENTANYL CITRATE 50 UG/ML
50 INJECTION, SOLUTION INTRAMUSCULAR; INTRAVENOUS
Status: DISCONTINUED | OUTPATIENT
Start: 2020-11-18 | End: 2020-11-18 | Stop reason: HOSPADM

## 2020-11-18 RX ORDER — PROMETHAZINE HYDROCHLORIDE 25 MG/1
25 SUPPOSITORY RECTAL ONCE AS NEEDED
Status: DISCONTINUED | OUTPATIENT
Start: 2020-11-18 | End: 2020-11-18 | Stop reason: HOSPADM

## 2020-11-18 RX ORDER — SODIUM CHLORIDE 0.9 % (FLUSH) 0.9 %
10 SYRINGE (ML) INJECTION AS NEEDED
Status: DISCONTINUED | OUTPATIENT
Start: 2020-11-18 | End: 2020-11-18 | Stop reason: HOSPADM

## 2020-11-18 RX ORDER — OXYCODONE HYDROCHLORIDE AND ACETAMINOPHEN 5; 325 MG/1; MG/1
1 TABLET ORAL EVERY 4 HOURS PRN
Qty: 30 TABLET | Refills: 0 | Status: SHIPPED | OUTPATIENT
Start: 2020-11-18 | End: 2020-12-03

## 2020-11-18 RX ORDER — BUPIVACAINE HYDROCHLORIDE AND EPINEPHRINE 5; 5 MG/ML; UG/ML
INJECTION, SOLUTION EPIDURAL; INTRACAUDAL; PERINEURAL AS NEEDED
Status: DISCONTINUED | OUTPATIENT
Start: 2020-11-18 | End: 2020-11-18 | Stop reason: HOSPADM

## 2020-11-18 RX ORDER — LIDOCAINE HYDROCHLORIDE 20 MG/ML
INJECTION, SOLUTION INFILTRATION; PERINEURAL AS NEEDED
Status: DISCONTINUED | OUTPATIENT
Start: 2020-11-18 | End: 2020-11-18 | Stop reason: SURG

## 2020-11-18 RX ORDER — SODIUM CHLORIDE 0.9 % (FLUSH) 0.9 %
10 SYRINGE (ML) INJECTION EVERY 12 HOURS SCHEDULED
Status: DISCONTINUED | OUTPATIENT
Start: 2020-11-18 | End: 2020-11-18 | Stop reason: HOSPADM

## 2020-11-18 RX ORDER — HYDROMORPHONE HYDROCHLORIDE 1 MG/ML
0.5 INJECTION, SOLUTION INTRAMUSCULAR; INTRAVENOUS; SUBCUTANEOUS
Status: DISCONTINUED | OUTPATIENT
Start: 2020-11-18 | End: 2020-11-18 | Stop reason: HOSPADM

## 2020-11-18 RX ADMIN — GLYCOPYRROLATE 0.4 MG: 0.2 INJECTION INTRAMUSCULAR; INTRAVENOUS at 10:12

## 2020-11-18 RX ADMIN — HYDROCODONE BITARTRATE AND ACETAMINOPHEN 1 TABLET: 7.5; 325 TABLET ORAL at 11:25

## 2020-11-18 RX ADMIN — ONDANSETRON 4 MG: 2 INJECTION INTRAMUSCULAR; INTRAVENOUS at 11:25

## 2020-11-18 RX ADMIN — CEFAZOLIN SODIUM 2 G: 2 INJECTION, SOLUTION INTRAVENOUS at 09:40

## 2020-11-18 RX ADMIN — NEOSTIGMINE METHYLSULFATE 3 MG: 1 INJECTION INTRAMUSCULAR; INTRAVENOUS; SUBCUTANEOUS at 10:12

## 2020-11-18 RX ADMIN — MIDAZOLAM 1 MG: 1 INJECTION INTRAMUSCULAR; INTRAVENOUS at 08:22

## 2020-11-18 RX ADMIN — PROPOFOL 150 MG: 10 INJECTION, EMULSION INTRAVENOUS at 09:35

## 2020-11-18 RX ADMIN — SODIUM CHLORIDE, POTASSIUM CHLORIDE, SODIUM LACTATE AND CALCIUM CHLORIDE 9 ML/HR: 600; 310; 30; 20 INJECTION, SOLUTION INTRAVENOUS at 08:22

## 2020-11-18 RX ADMIN — FENTANYL CITRATE 50 MCG: 50 INJECTION, SOLUTION INTRAMUSCULAR; INTRAVENOUS at 10:40

## 2020-11-18 RX ADMIN — FENTANYL CITRATE 50 MCG: 50 INJECTION, SOLUTION INTRAMUSCULAR; INTRAVENOUS at 11:05

## 2020-11-18 RX ADMIN — HYDROMORPHONE HYDROCHLORIDE 0.5 MG: 1 INJECTION, SOLUTION INTRAMUSCULAR; INTRAVENOUS; SUBCUTANEOUS at 11:20

## 2020-11-18 RX ADMIN — ROCURONIUM BROMIDE 25 MG: 10 INJECTION INTRAVENOUS at 09:35

## 2020-11-18 RX ADMIN — FENTANYL CITRATE 50 MCG: 50 INJECTION INTRAMUSCULAR; INTRAVENOUS at 09:35

## 2020-11-18 RX ADMIN — LIDOCAINE HYDROCHLORIDE 60 MG: 20 INJECTION, SOLUTION INFILTRATION; PERINEURAL at 09:35

## 2020-11-18 RX ADMIN — HYDROMORPHONE HYDROCHLORIDE 0.5 MG: 1 INJECTION, SOLUTION INTRAMUSCULAR; INTRAVENOUS; SUBCUTANEOUS at 10:50

## 2020-11-18 RX ADMIN — ONDANSETRON HYDROCHLORIDE 4 MG: 2 SOLUTION INTRAMUSCULAR; INTRAVENOUS at 10:07

## 2020-11-18 RX ADMIN — PHENYLEPHRINE HYDROCHLORIDE 100 MCG: 10 INJECTION INTRAVENOUS at 09:49

## 2020-11-18 RX ADMIN — DEXAMETHASONE SODIUM PHOSPHATE 6 MG: 10 INJECTION INTRAMUSCULAR; INTRAVENOUS at 09:43

## 2020-11-18 NOTE — ANESTHESIA PROCEDURE NOTES
Airway  Urgency: elective    Date/Time: 11/18/2020 9:37 AM  Airway not difficult    General Information and Staff    Patient location during procedure: OR  Anesthesiologist: Jose Carrasco MD  CRNA: Shila Mon CRNA    Indications and Patient Condition  Indications for airway management: airway protection    Preoxygenated: yes (pt pre-O2 with 100% O2)  Mask difficulty assessment: 2 - vent by mask + OA or adjuvant +/- NMBA (easy BMV )    Final Airway Details  Final airway type: endotracheal airway      Successful airway: ETT  Cuffed: yes   Successful intubation technique: direct laryngoscopy  Endotracheal tube insertion site: oral  Blade: Sanna  Blade size: 3  ETT size (mm): 7.0  Cormack-Lehane Classification: grade I - full view of glottis  Placement verified by: chest auscultation and capnometry   Cuff volume (mL): 7  Measured from: lips  ETT/EBT  to lips (cm): 21  Number of attempts at approach: 1  Assessment: lips, teeth, and gum same as pre-op and atraumatic intubation    Additional Comments  ATOETx1. No change in dentition.

## 2020-11-18 NOTE — OP NOTE
Operative Note :  Maria D Ariza MD    Nelsy Stokes  1955    Procedure Date: 11/18/20    Pre-op Diagnosis:  Biliary dyskinesia [K82.8]   Calculus of gallbladder with chronic cholecystitis without biliary obstruction  Umbilical hernia without obstruction or gangrene    Post-op Diagnosis:  Biliary dyskinesia [K82.8]  Calculus of gallbladder with chronic cholecystitis without biliary obstruction  Umbilical hernia without obstruction or gangrene    Procedure:   Laparoscopic cholecystectomy with intraoperative cholangiogram  Open umbilical hernia repair    Surgeon: Maria D Ariza MD    Assistant: Tigist Mcdermott CFA    Anesthesia:  General (general endotracheal tube)    Estimated Blood Loss: minimal    Specimens:  Gallbladder    Complications: None    Indications: Mrs. Stokes is a 65-year-old lady who came to see me recently with symptomatic cholelithiasis and biliary dyskinesia.  She also had a small but reducible umbilical hernia.  I recommended proceeding with laparoscopic cholecystectomy and cholangiogram as well as open umbilical hernia repair at her earliest convenience.  She presents today for the procedure.  She understands the risks of the procedure to include bleeding, possible common bile duct injury, and possible hernia recurrence.  Despite these risks, she has consented to proceed.    Findings: 1 cm umbilical hernia defect, normal cholangiogram    Description of procedure:  The patient was brought to the operating room and placed on the OR table in supine position.  An endotracheal tube was inserted, and general anesthesia was induced.  The anterior abdominal wall was shaved, prepped, and draped in a sterile fashion.  A surgical timeout was then completed.  A curvilinear infraumbilical skin incision was made after instillation of 0.5% Marcaine with epinephrine.  The umbilical stalk was dissected circumferentially and the umbilical stalk transected off the fascia using electrocautery revealing a  small umbilical hernia fascial defect.  2 separate 0 Ethibond figure-of-eight sutures were placed but not tied down and these were used as stay sutures.  A Gavin trocar was inserted and secured with the previously placed stay sutures.  The abdomen was then insufflated.  Under direct visualization, 3 additional 5 mm trocars were then placed within the subxiphoid and subcostal space on the right.  The gallbladder was retracted cephalad and infundibulum retracted inferiorly. The cystic duct and cystic artery were slowly dissected out circumferentially until a firm critical view was obtained.  A single Hemoclip was placed across the cystic duct at its junction with the gallbladder infundibulum and 3 hemoclips were placed across the cystic artery.  A small hole was created on the anterior wall of the cystic duct, and a cholangiogram catheter inserted through a right upper quadrant angiocatheter.  The catheter was secured within the duct with an additional Hemoclip and a cholangiogram was then obtained.  The cholangiogram showed normal filling of the cystic duct and common bile duct, retrograde filling of the intrahepatic ducts, and easy spillage into the duodenum with no filling defects appreciable.  The catheter was then withdrawn and 2 additional hemoclips placed across the cystic duct.  The cystic duct and artery were then transected, leaving 2 clips behind on both of the stumps.  The gallbladder was then slowly dissected off of the undersurface of the liver using electrocautery and it was removed from the peritoneal cavity within an Endo Catch bag at the umbilical trocar site.  The gallbladder was passed off to pathology.  The abdomen was then reinsufflated and right upper quadrant inspected. Warm normal saline was irrigated and suctioned dry.  The gallbladder fossa appeared hemostatic.  All trocars were then removed under direct visualization and the abdomen desufflated.  The umbilical fascial defect measuring 1 cm  was closed using the previously placed 0 Ethibond figure-of-eight stay sutures, all skin incisions closed with 4-0 Vicryl subcuticular stitches, and then each was dressed with Dermabond.  The patient was then extubated and transferred to PACU in stable condition.  All counts were correct per nursing.    Maria D Ariza MD  General and Endoscopic Surgery  [facility]    [address], [address]  [place], [place], [address]  P: [phone]  F: [phone]

## 2020-11-18 NOTE — ANESTHESIA PREPROCEDURE EVALUATION
Anesthesia Evaluation     Patient summary reviewed                Airway   Mallampati: II  Neck ROM: full  No difficulty expected  Dental      Pulmonary    (+) a smoker Current, COPD,   Cardiovascular     ECG reviewed  Rhythm: regular    (+) hypertension, PVD,       Neuro/Psych  GI/Hepatic/Renal/Endo      Musculoskeletal     Abdominal    Substance History      OB/GYN          Other                        Anesthesia Plan    ASA 3     general       Anesthetic plan, all risks, benefits, and alternatives have been provided, discussed and informed consent has been obtained with: patient.  Use of blood products discussed with patient .

## 2020-11-18 NOTE — ANESTHESIA POSTPROCEDURE EVALUATION
Patient: Nelsy Stokes    Procedure Summary     Date: 11/18/20 Room / Location: [facility] OR  / [facility] MAIN OR    Anesthesia Start: 0929 Anesthesia Stop: 1030    Procedure: LAPAROSCOPIC CHOLECYSTECTOMY WITH INTRAOPERATIVE CHOLANGIOGRAM, OPEN UMBILICAL HERNIA REPAIR (N/A Abdomen) Diagnosis:       Biliary dyskinesia      (Biliary dyskinesia [K82.8])    Surgeon: Maria D Ariza MD Provider: Jose Carrasco MD    Anesthesia Type: general ASA Status: 3          Anesthesia Type: general    Vitals  Vitals Value Taken Time   /78 11/18/20 1150   Temp 36.5 °C (97.7 °F) 11/18/20 1150   Pulse 80 11/18/20 1145   Resp 16 11/18/20 1145   SpO2 88 % 11/18/20 1149   Vitals shown include unvalidated device data.        Post Anesthesia Care and Evaluation    Patient location during evaluation: bedside  Patient participation: complete - patient participated  Level of consciousness: awake  Pain management: adequate  Airway patency: patent  Anesthetic complications: No anesthetic complications    Cardiovascular status: acceptable  Respiratory status: acceptable  Hydration status: acceptable

## 2020-11-19 LAB
LAB AP CASE REPORT: NORMAL
PATH REPORT.FINAL DX SPEC: NORMAL
PATH REPORT.GROSS SPEC: NORMAL

## 2020-12-03 ENCOUNTER — OFFICE VISIT (OUTPATIENT)
Dept: SURGERY | Facility: CLINIC | Age: 65
End: 2020-12-03

## 2020-12-03 DIAGNOSIS — K42.9 UMBILICAL HERNIA WITHOUT OBSTRUCTION AND WITHOUT GANGRENE: ICD-10-CM

## 2020-12-03 DIAGNOSIS — Z09 SURGICAL FOLLOWUP: Primary | ICD-10-CM

## 2020-12-03 DIAGNOSIS — K80.10 CALCULUS OF GALLBLADDER WITH CHRONIC CHOLECYSTITIS WITHOUT OBSTRUCTION: ICD-10-CM

## 2020-12-03 PROCEDURE — 99024 POSTOP FOLLOW-UP VISIT: CPT | Performed by: SURGERY

## 2020-12-03 NOTE — PROGRESS NOTES
CHIEF COMPLAINT:   Chief Complaint   Patient presents with   • Post-op     Laparoscopic cholecystectomy with intraoperative cholangiogram       HISTORY OF PRESENT ILLNESS:  This is a 65 y.o. female who presents for a post-operative visit after undergoing laparoscopic cholecystectomy with cholangiogram and umbilical hernia repair on 11/18/2020.  She is doing fantastic since surgery and feels great.  She denies any nausea, vomiting, diarrhea, fevers, chills, jaundice, or scleral icterus.    Pathology:   Gallbladder, Laparoscopic Cholecystectomy:                A.  Chronic cholecystitis.     PHYSICAL EXAM:  Lungs: Clear  Heart: RRR  ABD: Incisions are healing well without any erythema or signs of infection.  Ext: no significant edema, calves nontender    A/P:  This is a 65 y.o. female patient who is S/P laparoscopic cholecystectomy with cholangiogram and umbilical hernia repair on 11/18/2020    She is healing beautifully.  I discussed the benign pathology findings with her.  She can follow-up with me as needed.    Maria D Ariza MD  General and Endoscopic Surgery  [facility]    [address], [address]  [place], [place], [facility]  P: [phone]  F: [phone]

## 2020-12-09 ENCOUNTER — APPOINTMENT (OUTPATIENT)
Dept: OTHER | Facility: HOSPITAL | Age: 65
End: 2020-12-09

## 2020-12-16 ENCOUNTER — CLINICAL SUPPORT (OUTPATIENT)
Dept: OTHER | Facility: HOSPITAL | Age: 65
End: 2020-12-16

## 2020-12-16 ENCOUNTER — HOSPITAL ENCOUNTER (OUTPATIENT)
Dept: PET IMAGING | Facility: HOSPITAL | Age: 65
Discharge: HOME | End: 2020-12-16

## 2020-12-16 VITALS — BODY MASS INDEX: 25.1 KG/M2 | HEIGHT: 68 IN | WEIGHT: 165.6 LBS

## 2020-12-16 DIAGNOSIS — F17.210 SMOKING GREATER THAN 30 PACK YEARS: ICD-10-CM

## 2020-12-16 DIAGNOSIS — Z12.2 SCREENING FOR MALIGNANT NEOPLASM OF RESPIRATORY ORGAN: ICD-10-CM

## 2020-12-16 DIAGNOSIS — Z12.2 SCREENING FOR MALIGNANT NEOPLASM OF RESPIRATORY ORGAN: Primary | ICD-10-CM

## 2020-12-16 PROCEDURE — G0296 VISIT TO DETERM LDCT ELIG: HCPCS | Performed by: CLINICAL NURSE SPECIALIST

## 2020-12-16 PROCEDURE — G0297 LDCT FOR LUNG CA SCREEN: HCPCS

## 2020-12-16 NOTE — PROGRESS NOTES
[facility] Low-Dose Lung Cancer CT Screening Visit    Nelsy Stokes is a pleasant 65 y.o. female seen today at the request of FREDDY Jerry in our Lung Cancer Screening Clinic, being seen for Lung Cancer Screening Counseling and a Shared Decision Making Visit prior to Low-Dose Lung Cancer Screening CT exam.    SMOKING HISTORY:   Social History     Tobacco Use   Smoking Status Current Every Day Smoker   • Packs/day: 1.00   • Types: Cigarettes   Smokeless Tobacco Never Used   Tobacco Comment    caff use: 3 cups daily.  Began smoking at age 15.  Smoked .75 ppd for 7 years and otherwise 1 ppd for a 48.25 pack year history.       Nelsy Stokes is currently smoking 1 pack per day with a 48.25 pack year history.  Reports no use of alternate forms of tobacco, electronic cigarettes, marijuana or other substances.  Based on the recommendation of the [place] Preventive Services Task Force, this patient is at high risk for lung cancer and a low-dose CT screening scan is recommended.     We discussed the connection between Radon and Lung Cancer and the availability of free test kits.  She has a basement that has tested negative for Radon.  The patient worked in manufacturing warehouses for about 16 years with possible exposure to fine dust and particulate matter.  Some second-hand smoke exposure as a child with her father smoking in their home.    The patient has had no hemoptysis, unintentional weight loss or increasing shortness of breath. She does have a chronic loose cough.  The patient is asymptomatic and has no signs or symptoms of lung cancer.   The patient reports NO personal history of cancer.  Additionally, reports family history of lung cancer in her father.  She has been told that she has COPD and peripheral vascular disease and has had a carotid stent placed in the past.  We discussed the results of her last lung cancer screening CT which was negative.    Together we discussed the  potential benefits and potential harms of being screened for lung cancer including the potential for follow-up diagnostic testing, risk for over diagnosis, false positive rate and total radiation exposure using the [facility] standardized decision aid. We reviewed the patient's smoking history and counseled on the importance and health benefits of quitting smoking.    Smoking Cessation  DISCUSSION HELD TODAY:     We discussed that there are a number of resources and tobacco cessation interventions to assist with smoking cessation including the 1-800-Quit Now line, Health Department programs, [place] [facility] resources, and use of the [place] Department of Health and Human Services five keys for quitting and quit plan worksheet.  On a scale of zero to ten, the patient rates their motivation and readiness to quit at a 0 out of 10 today. She has thought about quitting but says that she just does not have the will power to commit to quitting at this time.  We discussed some basic strategies that may be helpful.     Recommendations for continued lung cancer screening:      We discussed the NCCN guidelines for lung cancer screening and the patient verbalized understanding that annual screening is recommended until fifteen years beyond smoking as long as they have no other disease or comorbidity that would prevent them from receiving cancer treatments such as surgery should a lung cancer be detected. The [facility] Lung Cancer Screening Shared Decision-Making Tool was utilized as an aid in discussing whether or not screening was right. The patient has decided to proceed with a Low Dose Lung Cancer Screening CT today. The patient is aware that the results of his screening will be shared with the referring provider or PCP as well.       The patient verbalizes understanding that results of this low dose lung CT exam will be called and that assistance will be provided in arranging any necessary  follow-up.    After review of the NCCN guidelines and recommendations for ongoing screening, the patient verbalized understanding of recommendations for follow-up. We discussed the importance of adherence to continued annual screening until 15 years beyond smoking or until other life-limiting comorbidities are present. We discussed the impact of comorbidities and ability and willing to undergo diagnosis and treatment.    The patient has been counseled on the health benefits of quitting tobacco, smoking cessation strategies and resources, as well as the importance of adherence to annual lung cancer low-dose CT screening.     Sammi Rowley, MSN, APRN, ACNS-BC, AOCN, CHPN  Clinical Nurse Specialist  [facility]

## 2020-12-16 NOTE — PATIENT INSTRUCTIONS
Patient verbalizes understanding that low-dose lung cancer screening is recommended every 12 months and is generally covered by insurance and medicare through the age of 77.

## 2020-12-17 DIAGNOSIS — J44.9 CHRONIC OBSTRUCTIVE PULMONARY DISEASE, UNSPECIFIED COPD TYPE (HCC): Primary | ICD-10-CM

## 2020-12-17 NOTE — PROGRESS NOTES
I have called the patient in follow-up with results - LungRADS Category 1 Negative with nothing concerning for lung cancer.  We discussed the results in detail including findings of some mild coronary artery calcifications, calcified lymph nodes, granulomas, and scarring.  She has concerns that she is on a number of inhalers and is not convinced that she needs them and would like to see a pulmonologist.  I advised her to discuss this with her PCP, FREDDY Jerry.  She verbalizes understanding that her next low-dose lung cancer screening CT is due in 12 months.

## 2020-12-31 LAB
25(OH)D3+25(OH)D2 SERPL-MCNC: 33.2 NG/ML (ref 30–100)
ALBUMIN SERPL-MCNC: 4.1 G/DL (ref 3.5–5.2)
ALBUMIN/GLOB SERPL: 2.1 G/DL
ALP SERPL-CCNC: 74 U/L (ref 39–117)
ALT SERPL-CCNC: 16 U/L (ref 1–33)
AST SERPL-CCNC: 12 U/L (ref 1–32)
BASOPHILS # BLD AUTO: 0.07 10*3/MM3 (ref 0–0.2)
BASOPHILS NFR BLD AUTO: 1.1 % (ref 0–1.5)
BILIRUB SERPL-MCNC: 0.4 MG/DL (ref 0–1.2)
BUN SERPL-MCNC: 11 MG/DL (ref 8–23)
BUN/CREAT SERPL: 11.1 (ref 7–25)
CALCIUM SERPL-MCNC: 9 MG/DL (ref 8.6–10.5)
CHLORIDE SERPL-SCNC: 104 MMOL/L (ref 98–107)
CHOLEST SERPL-MCNC: 142 MG/DL (ref 0–200)
CO2 SERPL-SCNC: 26.6 MMOL/L (ref 22–29)
CREAT SERPL-MCNC: 0.99 MG/DL (ref 0.57–1)
EOSINOPHIL # BLD AUTO: 0.13 10*3/MM3 (ref 0–0.4)
EOSINOPHIL NFR BLD AUTO: 2.1 % (ref 0.3–6.2)
ERYTHROCYTE [DISTWIDTH] IN BLOOD BY AUTOMATED COUNT: 12.9 % (ref 12.3–15.4)
FOLATE SERPL-MCNC: 10.5 NG/ML (ref 4.78–24.2)
GLOBULIN SER CALC-MCNC: 2 GM/DL
GLUCOSE SERPL-MCNC: 85 MG/DL (ref 65–99)
HBA1C MFR BLD: 6.2 % (ref 4.8–5.6)
HCT VFR BLD AUTO: 46 % (ref 34–46.6)
HDLC SERPL-MCNC: 55 MG/DL (ref 40–60)
HGB BLD-MCNC: 15 G/DL (ref 12–15.9)
IMM GRANULOCYTES # BLD AUTO: 0.01 10*3/MM3 (ref 0–0.05)
IMM GRANULOCYTES NFR BLD AUTO: 0.2 % (ref 0–0.5)
LDLC SERPL CALC-MCNC: 73 MG/DL (ref 0–100)
LYMPHOCYTES # BLD AUTO: 2.38 10*3/MM3 (ref 0.7–3.1)
LYMPHOCYTES NFR BLD AUTO: 37.5 % (ref 19.6–45.3)
MCH RBC QN AUTO: 30.4 PG (ref 26.6–33)
MCHC RBC AUTO-ENTMCNC: 32.6 G/DL (ref 31.5–35.7)
MCV RBC AUTO: 93.1 FL (ref 79–97)
MONOCYTES # BLD AUTO: 0.57 10*3/MM3 (ref 0.1–0.9)
MONOCYTES NFR BLD AUTO: 9 % (ref 5–12)
NEUTROPHILS # BLD AUTO: 3.18 10*3/MM3 (ref 1.7–7)
NEUTROPHILS NFR BLD AUTO: 50.1 % (ref 42.7–76)
NRBC BLD AUTO-RTO: 0 /100 WBC (ref 0–0.2)
PLATELET # BLD AUTO: 336 10*3/MM3 (ref 140–450)
POTASSIUM SERPL-SCNC: 4.4 MMOL/L (ref 3.5–5.2)
PROT SERPL-MCNC: 6.1 G/DL (ref 6–8.5)
RBC # BLD AUTO: 4.94 10*6/MM3 (ref 3.77–5.28)
SODIUM SERPL-SCNC: 138 MMOL/L (ref 136–145)
TRIGL SERPL-MCNC: 71 MG/DL (ref 0–150)
VIT B12 SERPL-MCNC: 859 PG/ML (ref 211–946)
VLDLC SERPL CALC-MCNC: 14 MG/DL (ref 5–40)
WBC # BLD AUTO: 6.34 10*3/MM3 (ref 3.4–10.8)

## 2021-01-19 ENCOUNTER — TELEPHONE (OUTPATIENT)
Dept: GASTROENTEROLOGY | Facility: CLINIC | Age: 66
End: 2021-01-19

## 2021-01-19 NOTE — TELEPHONE ENCOUNTER
Call to pt.  States saw results on mychart.   Advise per Dr Huber recommendations.  Verb understanding.      Had GB surgery with Dr Ariza 11/18.

## 2021-01-19 NOTE — TELEPHONE ENCOUNTER
----- Message from Jacque Huber MD sent at 10/12/2020  4:59 PM EDT -----  Biopsies from her EGD show chronic inflammation of the stomach body.  If she is taking ibuprofen, Aleve, high doses of aspirin on a regular basis, she needs to decrease this.    Additionally, active chronic inflammation of the GE junction.  No evidence of Barraza's esophagus nor precancerous changes    Continue the pantoprazole and Carafate.    I do think further work-up of the gallbladder is reasonable as per discussion with Gretchen King.

## 2021-01-20 ENCOUNTER — OFFICE VISIT (OUTPATIENT)
Dept: FAMILY MEDICINE CLINIC | Facility: CLINIC | Age: 66
End: 2021-01-20

## 2021-01-20 VITALS
OXYGEN SATURATION: 100 % | WEIGHT: 166 LBS | RESPIRATION RATE: 16 BRPM | TEMPERATURE: 97.5 F | HEIGHT: 68 IN | SYSTOLIC BLOOD PRESSURE: 130 MMHG | BODY MASS INDEX: 25.16 KG/M2 | HEART RATE: 69 BPM | DIASTOLIC BLOOD PRESSURE: 80 MMHG

## 2021-01-20 DIAGNOSIS — R73.01 IMPAIRED FASTING GLUCOSE: Chronic | ICD-10-CM

## 2021-01-20 DIAGNOSIS — I10 ESSENTIAL HYPERTENSION: Chronic | ICD-10-CM

## 2021-01-20 DIAGNOSIS — Z12.31 ENCOUNTER FOR SCREENING MAMMOGRAM FOR BREAST CANCER: ICD-10-CM

## 2021-01-20 DIAGNOSIS — Z72.0 TOBACCO ABUSE: ICD-10-CM

## 2021-01-20 DIAGNOSIS — I70.0 AORTIC ATHEROSCLEROSIS (HCC): Primary | Chronic | ICD-10-CM

## 2021-01-20 DIAGNOSIS — E53.8 LOW FOLIC ACID: Chronic | ICD-10-CM

## 2021-01-20 DIAGNOSIS — E55.9 VITAMIN D DEFICIENCY: Chronic | ICD-10-CM

## 2021-01-20 DIAGNOSIS — I73.9 PAD (PERIPHERAL ARTERY DISEASE) (HCC): Chronic | ICD-10-CM

## 2021-01-20 DIAGNOSIS — I65.23 BILATERAL CAROTID ARTERY STENOSIS: Chronic | ICD-10-CM

## 2021-01-20 DIAGNOSIS — I65.22 LEFT CAROTID ARTERY STENOSIS: Chronic | ICD-10-CM

## 2021-01-20 DIAGNOSIS — Z87.442 HISTORY OF RENAL STONE: ICD-10-CM

## 2021-01-20 DIAGNOSIS — Z78.0 POST-MENOPAUSAL: ICD-10-CM

## 2021-01-20 DIAGNOSIS — E78.2 MIXED HYPERLIPIDEMIA: Chronic | ICD-10-CM

## 2021-01-20 PROCEDURE — 99214 OFFICE O/P EST MOD 30 MIN: CPT | Performed by: PHYSICIAN ASSISTANT

## 2021-01-20 RX ORDER — EZETIMIBE 10 MG/1
10 TABLET ORAL DAILY
Qty: 90 TABLET | Refills: 1 | Status: SHIPPED | OUTPATIENT
Start: 2021-01-20 | End: 2021-07-20 | Stop reason: SDUPTHER

## 2021-01-20 RX ORDER — PANTOPRAZOLE SODIUM 40 MG/1
TABLET, DELAYED RELEASE ORAL
Qty: 90 TABLET | Refills: 1 | Status: SHIPPED | OUTPATIENT
Start: 2021-01-20 | End: 2021-07-20 | Stop reason: SDUPTHER

## 2021-01-20 RX ORDER — MAGNESIUM 200 MG
TABLET ORAL
Qty: 90 EACH | Refills: 3 | Status: SHIPPED | OUTPATIENT
Start: 2021-01-20 | End: 2021-07-24

## 2021-01-20 RX ORDER — FOLIC ACID 1 MG/1
1 TABLET ORAL DAILY
Qty: 90 TABLET | Refills: 1 | Status: SHIPPED | OUTPATIENT
Start: 2021-01-20 | End: 2021-07-20 | Stop reason: SDUPTHER

## 2021-01-20 RX ORDER — VALSARTAN 320 MG/1
320 TABLET ORAL DAILY
Qty: 90 TABLET | Refills: 1 | Status: SHIPPED | OUTPATIENT
Start: 2021-01-20 | End: 2021-07-20 | Stop reason: SDUPTHER

## 2021-01-20 RX ORDER — VALSARTAN 320 MG/1
320 TABLET ORAL DAILY
Qty: 90 TABLET | Refills: 1 | Status: SHIPPED | OUTPATIENT
Start: 2021-01-20 | End: 2021-01-20 | Stop reason: SDUPTHER

## 2021-01-20 NOTE — PATIENT INSTRUCTIONS
"Hypertension, Adult  High blood pressure (hypertension) is when the force of blood pumping through the arteries is too strong. The arteries are the blood vessels that carry blood from the heart throughout the body. Hypertension forces the heart to work harder to pump blood and may cause arteries to become narrow or stiff. Untreated or uncontrolled hypertension can cause a heart attack, heart failure, a stroke, kidney disease, and other problems.  A blood pressure reading consists of a higher number over a lower number. Ideally, your blood pressure should be below 120/80. The first (\"top\") number is called the systolic pressure. It is a measure of the pressure in your arteries as your heart beats. The second (\"bottom\") number is called the diastolic pressure. It is a measure of the pressure in your arteries as the heart relaxes.  What are the causes?  The exact cause of this condition is not known. There are some conditions that result in or are related to high blood pressure.  What increases the risk?  Some risk factors for high blood pressure are under your control. The following factors may make you more likely to develop this condition:  · Smoking.  · Having type 2 diabetes mellitus, high cholesterol, or both.  · Not getting enough exercise or physical activity.  · Being overweight.  · Having too much fat, sugar, calories, or salt (sodium) in your diet.  · Drinking too much alcohol.  Some risk factors for high blood pressure may be difficult or impossible to change. Some of these factors include:  · Having chronic kidney disease.  · Having a family history of high blood pressure.  · Age. Risk increases with age.  · Race. You may be at higher risk if you are .  · Gender. Men are at higher risk than women before age 45. After age 65, women are at higher risk than men.  · Having obstructive sleep apnea.  · Stress.  What are the signs or symptoms?  High blood pressure may not cause symptoms. Very high " blood pressure (hypertensive crisis) may cause:  · Headache.  · Anxiety.  · Shortness of breath.  · Nosebleed.  · Nausea and vomiting.  · Vision changes.  · Severe chest pain.  · Seizures.  How is this diagnosed?  This condition is diagnosed by measuring your blood pressure while you are seated, with your arm resting on a flat surface, your legs uncrossed, and your feet flat on the floor. The cuff of the blood pressure monitor will be placed directly against the skin of your upper arm at the level of your heart. It should be measured at least twice using the same arm. Certain conditions can cause a difference in blood pressure between your right and left arms.  Certain factors can cause blood pressure readings to be lower or higher than normal for a short period of time:  · When your blood pressure is higher when you are in a health care provider's office than when you are at home, this is called white coat hypertension. Most people with this condition do not need medicines.  · When your blood pressure is higher at home than when you are in a health care provider's office, this is called masked hypertension. Most people with this condition may need medicines to control blood pressure.  If you have a high blood pressure reading during one visit or you have normal blood pressure with other risk factors, you may be asked to:  · Return on a different day to have your blood pressure checked again.  · Monitor your blood pressure at home for 1 week or longer.  If you are diagnosed with hypertension, you may have other blood or imaging tests to help your health care provider understand your overall risk for other conditions.  How is this treated?  This condition is treated by making healthy lifestyle changes, such as eating healthy foods, exercising more, and reducing your alcohol intake. Your health care provider may prescribe medicine if lifestyle changes are not enough to get your blood pressure under control, and  if:  · Your systolic blood pressure is above 130.  · Your diastolic blood pressure is above 80.  Your personal target blood pressure may vary depending on your medical conditions, your age, and other factors.  Follow these instructions at home:  Eating and drinking    · Eat a diet that is high in fiber and potassium, and low in sodium, added sugar, and fat. An example eating plan is called the DASH (Dietary Approaches to Stop Hypertension) diet. To eat this way:  ? Eat plenty of fresh fruits and vegetables. Try to fill one half of your plate at each meal with fruits and vegetables.  ? Eat whole grains, such as whole-wheat pasta, brown rice, or whole-grain bread. Fill about one fourth of your plate with whole grains.  ? Eat or drink low-fat dairy products, such as skim milk or low-fat yogurt.  ? Avoid fatty cuts of meat, processed or cured meats, and poultry with skin. Fill about one fourth of your plate with lean proteins, such as fish, chicken without skin, beans, eggs, or tofu.  ? Avoid pre-made and processed foods. These tend to be higher in sodium, added sugar, and fat.  · Reduce your daily sodium intake. Most people with hypertension should eat less than 1,500 mg of sodium a day.  · Do not drink alcohol if:  ? Your health care provider tells you not to drink.  ? You are pregnant, may be pregnant, or are planning to become pregnant.  · If you drink alcohol:  ? Limit how much you use to:  § 0-1 drink a day for women.  § 0-2 drinks a day for men.  ? Be aware of how much alcohol is in your drink. In the [place] one drink equals one 12 oz bottle of beer (355 mL), one 5 oz glass of wine (148 mL), or one 1½ oz glass of hard liquor (44 mL).  Lifestyle    · Work with your health care provider to maintain a healthy body weight or to lose weight. Ask what an ideal weight is for you.  · Get at least 30 minutes of exercise most days of the week. Activities may include walking, swimming, or biking.  · Include exercise to  strengthen your muscles (resistance exercise), such as Pilates or lifting weights, as part of your weekly exercise routine. Try to do these types of exercises for 30 minutes at least 3 days a week.  · Do not use any products that contain nicotine or tobacco, such as cigarettes, e-cigarettes, and chewing tobacco. If you need help quitting, ask your health care provider.  · Monitor your blood pressure at home as told by your health care provider.  · Keep all follow-up visits as told by your health care provider. This is important.  Medicines  · Take over-the-counter and prescription medicines only as told by your health care provider. Follow directions carefully. Blood pressure medicines must be taken as prescribed.  · Do not skip doses of blood pressure medicine. Doing this puts you at risk for problems and can make the medicine less effective.  · Ask your health care provider about side effects or reactions to medicines that you should watch for.  Contact a health care provider if you:  · Think you are having a reaction to a medicine you are taking.  · Have headaches that keep coming back (recurring).  · Feel dizzy.  · Have swelling in your ankles.  · Have trouble with your vision.  Get help right away if you:  · Develop a severe headache or confusion.  · Have unusual weakness or numbness.  · Feel faint.  · Have severe pain in your chest or abdomen.  · Vomit repeatedly.  · Have trouble breathing.  Summary  · Hypertension is when the force of blood pumping through your arteries is too strong. If this condition is not controlled, it may put you at risk for serious complications.  · Your personal target blood pressure may vary depending on your medical conditions, your age, and other factors. For most people, a normal blood pressure is less than 120/80.  · Hypertension is treated with lifestyle changes, medicines, or a combination of both. Lifestyle changes include losing weight, eating a healthy, low-sodium diet,  exercising more, and limiting alcohol.  This information is not intended to replace advice given to you by your health care provider. Make sure you discuss any questions you have with your health care provider.  Document Revised: 08/28/2019 Document Reviewed: 08/28/2019  Elsevier Patient Education © 2020 [employer]      Coping with Quitting Smoking    Quitting smoking is a physical and mental challenge. You will face cravings, withdrawal symptoms, and temptation. Before quitting, work with your health care provider to make a plan that can help you cope. Preparation can help you quit and keep you from giving in.  How can I cope with cravings?  Cravings usually last for 5-10 minutes. If you get through it, the craving will pass. Consider taking the following actions to help you cope with cravings:  · Keep your mouth busy:  ? Chew sugar-free gum.  ? Suck on hard candies or a straw.  ? Brush your teeth.  · Keep your hands and body busy:  ? Immediately change to a different activity when you feel a craving.  ? Squeeze or play with a ball.  ? Do an activity or a hobby, like making bead jewelry, practicing needlepoint, or working with wood.  ? Mix up your normal routine.  ? Take a short exercise break. Go for a quick walk or run up and down stairs.  ? Spend time in public places where smoking is not allowed.  · Focus on doing something kind or helpful for someone else.  · Call a friend or family member to talk during a craving.  · Join a support group.  · Call a quit line, such as 1-800-QUIT-NOW.  · Talk with your health care provider about medicines that might help you cope with cravings and make quitting easier for you.  How can I deal with withdrawal symptoms?  Your body may experience negative effects as it tries to get used to not having nicotine in the system. These effects are called withdrawal symptoms. They may include:  · Feeling hungrier than normal.  · Trouble concentrating.  · Irritability.  · Trouble  sleeping.  · Feeling depressed.  · Restlessness and agitation.  · Craving a cigarette.  To manage withdrawal symptoms:  · Avoid places, people, and activities that trigger your cravings.  · Remember why you want to quit.  · Get plenty of sleep.  · Avoid coffee and other caffeinated drinks. These may worsen some of your symptoms.  How can I handle social situations?  Social situations can be difficult when you are quitting smoking, especially in the first few weeks. To manage this, you can:  · Avoid parties, bars, and other social situations where people might be smoking.  · Avoid alcohol.  · Leave right away if you have the urge to smoke.  · Explain to your family and friends that you are quitting smoking. Ask for understanding and support.  · Plan activities with friends or family where smoking is not an option.  What are some ways I can cope with stress?  Wanting to smoke may cause stress, and stress can make you want to smoke. Find ways to manage your stress. Relaxation techniques can help. For example:  · Breathe slowly and deeply, in through your nose and out through your mouth.  · Listen to soothing, relaxing music.  · Talk with a family member or friend about your stress.  · Light a candle.  · Soak in a bath or take a shower.  · Think about a peaceful place.  What are some ways I can prevent weight gain?  Be aware that many people gain weight after they quit smoking. However, not everyone does. To keep from gaining weight, have a plan in place before you quit and stick to the plan after you quit. Your plan should include:  · Having healthy snacks. When you have a craving, it may help to:  ? Eat plain popcorn, crunchy carrots, celery, or other cut vegetables.  ? Chew sugar-free gum.  · Changing how you eat:  ? Eat small portion sizes at meals.  ? Eat 4-6 small meals throughout the day instead of 1-2 large meals a day.  ? Be mindful when you eat. Do not watch television or do other things that might distract  you as you eat.  · Exercising regularly:  ? Make time to exercise each day. If you do not have time for a long workout, do short bouts of exercise for 5-10 minutes several times a day.  ? Do some form of strengthening exercise, like weight lifting, and some form of aerobic exercise, like running or swimming.  · Drinking plenty of water or other low-calorie or no-calorie drinks. Drink 6-8 glasses of water daily, or as much as instructed by your health care provider.  Summary  · Quitting smoking is a physical and mental challenge. You will face cravings, withdrawal symptoms, and temptation to smoke again. Preparation can help you as you go through these challenges.  · You can cope with cravings by keeping your mouth busy (such as by chewing gum), keeping your body and hands busy, and making calls to family, friends, or a helpline for people who want to quit smoking.  · You can cope with withdrawal symptoms by avoiding places where people smoke, avoiding drinks with caffeine, and getting plenty of rest.  · Ask your health care provider about the different ways to prevent weight gain, avoid stress, and handle social situations.  This information is not intended to replace advice given to you by your health care provider. Make sure you discuss any questions you have with your health care provider.  Document Revised: 11/30/2018 Document Reviewed: 12/15/2017  Elsevier Patient Education © 2020 [employer]

## 2021-01-20 NOTE — PROGRESS NOTES
"Subjective   Nelsy Stokes is a 65 y.o. female.     History of Present Illness    Since the last visit, she has overall felt fairly well.  She has Essential Hypertension and well controlled on current medication, Impaired fasting glucose and will monitor labs to watch for DMII, Hyperlipidemia on Zetia and is effective with lowering lipid values and Vitamin D deficiency and labs are at goal >30 ng/mL.  she has been compliant with current medications have reviewed them.  The patient denies medication side effects.  Will refill medications. /80   Pulse 69   Temp 97.5 °F (36.4 °C)   Resp 16   Ht 172.7 cm (67.99\")   Wt 75.3 kg (166 lb)   LMP  (LMP Unknown)   SpO2 100%   BMI 25.25 kg/m²   Trial statin once a week----still muscle pain; on Zetia  Sees urologist -----f/u PRN ---had a stone  Sees vasc --Dr Leong for PAD    Sees cardiologist --CAD  Had cholecystectomy ---feels so much better;  Only PPI occas now  Suggest flu shot  LDL much better; almost at goal--of <70  She is seeing Dr Hernandez--has pulm appt  Watching renal labs; likely dehydration on last lab  The following portions of the patient's history were reviewed and updated as appropriate: allergies, current medications, past family history, past medical history, past social history, past surgical history and problem list.    Review of Systems   Constitutional: Negative for activity change, appetite change and unexpected weight change.   HENT: Negative for nosebleeds and trouble swallowing.    Eyes: Negative for pain and visual disturbance.   Respiratory: Negative for chest tightness, shortness of breath and wheezing.    Cardiovascular: Negative for chest pain and palpitations.   Gastrointestinal: Negative for abdominal pain and blood in stool.   Endocrine: Negative.    Genitourinary: Negative for difficulty urinating and hematuria.   Musculoskeletal: Negative for joint swelling.   Skin: Negative for color change and rash.   Allergic/Immunologic: " Negative.    Neurological: Negative for syncope and speech difficulty.   Hematological: Negative for adenopathy.   Psychiatric/Behavioral: Negative for agitation, confusion, dysphoric mood and sleep disturbance.   All other systems reviewed and are negative.      Objective   Physical Exam  Constitutional:       General: She is not in acute distress.     Appearance: She is well-developed. She is not ill-appearing, toxic-appearing or diaphoretic.   HENT:      Head: Normocephalic and atraumatic.      Right Ear: External ear normal.      Left Ear: External ear normal.   Eyes:      Conjunctiva/sclera: Conjunctivae normal.   Neck:      Musculoskeletal: Normal range of motion.      Vascular: No carotid bruit.   Cardiovascular:      Rate and Rhythm: Normal rate and regular rhythm.      Pulses: Normal pulses.      Heart sounds: Normal heart sounds.   Pulmonary:      Effort: Pulmonary effort is normal. No respiratory distress.   Musculoskeletal: Normal range of motion.      Right lower leg: No edema.      Left lower leg: No edema.      Comments: Decreased pedal pulses 1+=   Skin:     General: Skin is dry.      Coloration: Skin is not jaundiced.   Neurological:      General: No focal deficit present.      Mental Status: She is alert and oriented to person, place, and time. Mental status is at baseline.      Coordination: Coordination normal.   Psychiatric:         Mood and Affect: Mood normal.         Behavior: Behavior normal.         Thought Content: Thought content normal.         Judgment: Judgment normal.         Assessment/Plan   Diagnoses and all orders for this visit:    1. Aortic atherosclerosis (CMS/HCC) (Primary)  -     DEXA Bone Density Axial; Future  -     Mammo Screening Digital Tomosynthesis Bilateral With CAD; Future    2. PAD (peripheral artery disease) (CMS/HCC)  -     DEXA Bone Density Axial; Future  -     Mammo Screening Digital Tomosynthesis Bilateral With CAD; Future    3. Bilateral carotid artery  stenosis  -     DEXA Bone Density Axial; Future  -     Mammo Screening Digital Tomosynthesis Bilateral With CAD; Future    4. Tobacco abuse  -     DEXA Bone Density Axial; Future  -     Mammo Screening Digital Tomosynthesis Bilateral With CAD; Future    5. Impaired fasting glucose  -     DEXA Bone Density Axial; Future  -     Mammo Screening Digital Tomosynthesis Bilateral With CAD; Future    6. Essential hypertension  -     DEXA Bone Density Axial; Future  -     Mammo Screening Digital Tomosynthesis Bilateral With CAD; Future    7. Mixed hyperlipidemia  -     DEXA Bone Density Axial; Future  -     Mammo Screening Digital Tomosynthesis Bilateral With CAD; Future    8. Vitamin D deficiency  -     DEXA Bone Density Axial; Future  -     Mammo Screening Digital Tomosynthesis Bilateral With CAD; Future    9. Left carotid artery stenosis  -     DEXA Bone Density Axial; Future  -     Mammo Screening Digital Tomosynthesis Bilateral With CAD; Future    10. Encounter for screening mammogram for breast cancer  -     DEXA Bone Density Axial; Future  -     Mammo Screening Digital Tomosynthesis Bilateral With CAD; Future    11. Post-menopausal  -     DEXA Bone Density Axial; Future  -     Mammo Screening Digital Tomosynthesis Bilateral With CAD; Future    12. History of renal stone    13. Low folic acid    Other orders  -     pantoprazole (Protonix) 40 MG EC tablet; One PO daily for GERD  Dispense: 90 tablet; Refill: 1  -     Cyanocobalamin (Vitamin B-12) 1000 MCG sublingual tablet; One SL QOD  Dispense: 90 each; Refill: 3  -     folic acid (FOLVITE) 1 MG tablet; Take 1 tablet by mouth Daily.  Dispense: 90 tablet; Refill: 1  -     ezetimibe (ZETIA) 10 MG tablet; Take 1 tablet by mouth Daily. For cholesterol  Dispense: 90 tablet; Refill: 1  -     Discontinue: valsartan (DIOVAN) 320 MG tablet; Take 1 tablet by mouth Daily. for HTN  Dispense: 90 tablet; Refill: 1  -     valsartan (DIOVAN) 320 MG tablet; Take 1 tablet by mouth Daily.  for HTN  Dispense: 90 tablet; Refill: 1      F/u renal labs in 6 mos--avoid NSAIDs  Suggest Chantix  B12 QOD now  Still on folic acid  Stop smoking  Plan, Nelsy Stokes, was seen today.  she was seen for HTN and continue medication, Imparied fasting glucose and plan follow up labs, diet, and exercise, Hyperlipidemia and will continue current medication and Vitamin D deficiency and supplemented.  Rare GERD and PPI works  COPD---has inhalers  Labs Mandie

## 2021-02-25 NOTE — RESEARCH
[facility] Surveillance Project    Physiologic High Risk Criteria  N/A    Anatomic High Risk Criteria  High cervical carotid artery stenosis    CCA vessel disease:  None    ICA vessel tortuosity:  Normal    Lesion(s) treated:  ICA    Lesion length:  15 mm    Primary lesion stenosis: 99 %    Atropine:  Not used    Glycopyrrolate:  Prophylactic    Bradyarrhythmia requiring treatment: none    Drain placed:  No    Total reverse flow time:  7 minuntes    Total fluoroscopy dose (Cumulative DAP):  7.06 Gy/cm2    Total fluoroscopy time:  3.4 min    Total contrast used:  14 mL    Arch Type:  Type 2 - The arch vessels arise between the parallel planes delineated by the outer and iner curves of the arch (moderate angulation).  The vertical distance from the orgin of niranjan innominate artery to top of the arch is between 1 and 2 left CCA diameters.    Bovine Arch:  No    Number of distinct lesions treated: 1    Was secondary stenosis, distal or proximal to ICA lesion treat and contralateral stenosis, treated?: no    Secondary lesion stenosis: na %     O-L Flap Text: The defect edges were debeveled with a #15 scalpel blade.  Given the location of the defect, shape of the defect and the proximity to free margins an O-L flap was deemed most appropriate.  Using a sterile surgical marker, an appropriate advancement flap was drawn incorporating the defect and placing the expected incisions within the relaxed skin tension lines where possible.    The area thus outlined was incised deep to adipose tissue with a #15 scalpel blade.  The skin margins were undermined to an appropriate distance in all directions utilizing iris scissors.

## 2021-03-04 ENCOUNTER — IMMUNIZATION (OUTPATIENT)
Dept: VACCINE CLINIC | Facility: HOSPITAL | Age: 66
End: 2021-03-04

## 2021-03-04 PROCEDURE — 0001A: CPT | Performed by: INTERNAL MEDICINE

## 2021-03-04 PROCEDURE — 91300 HC SARSCOV02 VAC 30MCG/0.3ML IM: CPT | Performed by: INTERNAL MEDICINE

## 2021-03-25 ENCOUNTER — IMMUNIZATION (OUTPATIENT)
Dept: VACCINE CLINIC | Facility: HOSPITAL | Age: 66
End: 2021-03-25

## 2021-03-25 PROCEDURE — 91300 HC SARSCOV02 VAC 30MCG/0.3ML IM: CPT | Performed by: INTERNAL MEDICINE

## 2021-03-25 PROCEDURE — 0002A: CPT | Performed by: INTERNAL MEDICINE

## 2021-04-06 ENCOUNTER — APPOINTMENT (OUTPATIENT)
Dept: MAMMOGRAPHY | Facility: HOSPITAL | Age: 66
End: 2021-04-06

## 2021-04-06 ENCOUNTER — APPOINTMENT (OUTPATIENT)
Dept: BONE DENSITY | Facility: HOSPITAL | Age: 66
End: 2021-04-06

## 2021-05-21 ENCOUNTER — APPOINTMENT (OUTPATIENT)
Dept: MAMMOGRAPHY | Facility: HOSPITAL | Age: 66
End: 2021-05-21

## 2021-05-21 ENCOUNTER — APPOINTMENT (OUTPATIENT)
Dept: BONE DENSITY | Facility: HOSPITAL | Age: 66
End: 2021-05-21

## 2021-07-06 ENCOUNTER — TELEPHONE (OUTPATIENT)
Dept: FAMILY MEDICINE CLINIC | Facility: CLINIC | Age: 66
End: 2021-07-06

## 2021-07-06 NOTE — TELEPHONE ENCOUNTER
Caller: Nelsy Stokes    Relationship: Self    Best call back number:[phone]   What is the best time to reach you: ANYTIME    Who are you requesting to speak with (clinical staff, provider,  specific staff member): CLINICAL    Do you know the name of the person who called: WILIAM    What was the call regarding: STATES HER FEET HAVE BEEN SWOLLEN FOR TWO WEEKS AND WANTS TO KNOW IF SHE SHOULD COME TO SEE KAUN OR THE CARDIOLOGIST .     Do you require a callback: YES

## 2021-07-06 NOTE — TELEPHONE ENCOUNTER
If she is having an acute problem more than swelling she can go to the audiology acute clinic.  Otherwise she can make an appointment with me or the cardiology group

## 2021-07-20 ENCOUNTER — OFFICE VISIT (OUTPATIENT)
Dept: FAMILY MEDICINE CLINIC | Facility: CLINIC | Age: 66
End: 2021-07-20

## 2021-07-20 VITALS
HEIGHT: 68 IN | BODY MASS INDEX: 25.01 KG/M2 | OXYGEN SATURATION: 99 % | HEART RATE: 78 BPM | RESPIRATION RATE: 16 BRPM | DIASTOLIC BLOOD PRESSURE: 69 MMHG | SYSTOLIC BLOOD PRESSURE: 120 MMHG | WEIGHT: 165 LBS | TEMPERATURE: 97.5 F

## 2021-07-20 DIAGNOSIS — I73.9 PAD (PERIPHERAL ARTERY DISEASE) (HCC): ICD-10-CM

## 2021-07-20 DIAGNOSIS — I65.22 LEFT CAROTID ARTERY STENOSIS: ICD-10-CM

## 2021-07-20 DIAGNOSIS — E78.2 MIXED HYPERLIPIDEMIA: ICD-10-CM

## 2021-07-20 DIAGNOSIS — L03.115 CELLULITIS OF RIGHT LOWER EXTREMITY: Primary | ICD-10-CM

## 2021-07-20 DIAGNOSIS — E55.9 VITAMIN D DEFICIENCY: ICD-10-CM

## 2021-07-20 DIAGNOSIS — Z72.0 TOBACCO ABUSE: ICD-10-CM

## 2021-07-20 DIAGNOSIS — I10 ESSENTIAL HYPERTENSION: ICD-10-CM

## 2021-07-20 DIAGNOSIS — E53.8 LOW FOLIC ACID: ICD-10-CM

## 2021-07-20 PROCEDURE — 99214 OFFICE O/P EST MOD 30 MIN: CPT | Performed by: PHYSICIAN ASSISTANT

## 2021-07-20 RX ORDER — VALSARTAN 320 MG/1
320 TABLET ORAL DAILY
Qty: 90 TABLET | Refills: 1 | Status: SHIPPED | OUTPATIENT
Start: 2021-07-20 | End: 2022-01-19 | Stop reason: SDUPTHER

## 2021-07-20 RX ORDER — FLUCONAZOLE 150 MG/1
150 TABLET ORAL ONCE
Qty: 1 TABLET | Refills: 2 | Status: SHIPPED | OUTPATIENT
Start: 2021-07-20 | End: 2021-07-20

## 2021-07-20 RX ORDER — FOLIC ACID 1 MG/1
1 TABLET ORAL DAILY
Qty: 90 TABLET | Refills: 1 | Status: SHIPPED | OUTPATIENT
Start: 2021-07-20 | End: 2022-01-19 | Stop reason: SDUPTHER

## 2021-07-20 RX ORDER — EZETIMIBE 10 MG/1
10 TABLET ORAL DAILY
Qty: 90 TABLET | Refills: 1 | Status: SHIPPED | OUTPATIENT
Start: 2021-07-20 | End: 2022-01-19 | Stop reason: SDUPTHER

## 2021-07-20 RX ORDER — ROSUVASTATIN CALCIUM 5 MG/1
TABLET, COATED ORAL
Qty: 30 TABLET | Refills: 11 | Status: SHIPPED | OUTPATIENT
Start: 2021-07-20 | End: 2022-01-19

## 2021-07-20 RX ORDER — PANTOPRAZOLE SODIUM 40 MG/1
TABLET, DELAYED RELEASE ORAL
Qty: 90 TABLET | Refills: 1 | Status: SHIPPED | OUTPATIENT
Start: 2021-07-20 | End: 2022-01-19 | Stop reason: SDUPTHER

## 2021-07-20 RX ORDER — DOXYCYCLINE 100 MG/1
100 CAPSULE ORAL 2 TIMES DAILY
Qty: 20 CAPSULE | Refills: 0 | Status: SHIPPED | OUTPATIENT
Start: 2021-07-20 | End: 2021-07-30

## 2021-07-20 NOTE — PROGRESS NOTES
"Subjective   Nelsy Stokes is a 66 y.o. female.     History of Present Illness    Since the last visit, she has overall felt fairly well.  She has Essential Hypertension and well controlled on current medication, GERD controlled on PPI Rx, Vitamin D deficiency and will update labs for continued management and COPD---sees pulm.;  PAD--sees vascular;   need LDL chol <70---intol to higher statin dose.  she has been compliant with current medications have reviewed them.  The patient denies medication side effects.  Will refill medications. /69 (BP Location: Left arm, Patient Position: Sitting, Cuff Size: Adult)   Pulse 78   Temp 97.5 °F (36.4 °C)   Resp 16   Ht 172.7 cm (67.99\")   Wt 74.8 kg (165 lb)   LMP  (LMP Unknown)   SpO2 99%   BMI 25.09 kg/m²     Results for orders placed or performed in visit on 06/01/21   Comprehensive metabolic panel    Specimen: Blood   Result Value Ref Range    Glucose 89 65 - 99 mg/dL    BUN 11 8 - 23 mg/dL    Creatinine 1.02 (H) 0.57 - 1.00 mg/dL    eGFR Non African Am 54 (L) >60 mL/min/1.73    eGFR African Am 66 >60 mL/min/1.73    BUN/Creatinine Ratio 10.8 7.0 - 25.0    Sodium 142 136 - 145 mmol/L    Potassium 4.8 3.5 - 5.2 mmol/L    Chloride 102 98 - 107 mmol/L    Total CO2 27.0 22.0 - 29.0 mmol/L    Calcium 9.5 8.6 - 10.5 mg/dL    Total Protein 6.7 6.0 - 8.5 g/dL    Albumin 4.80 3.50 - 5.20 g/dL    Globulin 1.9 gm/dL    A/G Ratio 2.5 g/dL    Total Bilirubin 0.9 0.0 - 1.2 mg/dL    Alkaline Phosphatase 89 39 - 117 U/L    AST (SGOT) 14 1 - 32 U/L    ALT (SGPT) 18 1 - 33 U/L   Lipid panel    Specimen: Blood   Result Value Ref Range    Total Cholesterol 156 0 - 200 mg/dL    Triglycerides 61 0 - 150 mg/dL    HDL Cholesterol 57 40 - 60 mg/dL    VLDL Cholesterol Kale 12 5 - 40 mg/dL    LDL Chol Calc (NIH) 87 0 - 100 mg/dL   Hemoglobin A1c    Specimen: Blood   Result Value Ref Range    Hemoglobin A1C 6.00 (H) 4.80 - 5.60 %   Vitamin B12    Specimen: Blood   Result Value Ref Range "    Vitamin B-12 859 211 - 946 pg/mL   Folate    Specimen: Blood   Result Value Ref Range    Folate 15.90 4.78 - 24.20 ng/mL   Vitamin D 25 Hydroxy    Specimen: Blood   Result Value Ref Range    25 Hydroxy, Vitamin D 44.4 30.0 - 100.0 ng/ml   CBC & Differential    Specimen: Blood   Result Value Ref Range    WBC 7.10 3.40 - 10.80 10*3/mm3    RBC 5.17 3.77 - 5.28 10*6/mm3    Hemoglobin 16.2 (H) 12.0 - 15.9 g/dL    Hematocrit 49.4 (H) 34.0 - 46.6 %    MCV 95.6 79.0 - 97.0 fL    MCH 31.3 26.6 - 33.0 pg    MCHC 32.8 31.5 - 35.7 g/dL    RDW 12.6 12.3 - 15.4 %    Platelets 318 140 - 450 10*3/mm3    Neutrophil Rel % 46.4 42.7 - 76.0 %    Lymphocyte Rel % 42.4 19.6 - 45.3 %    Monocyte Rel % 8.5 5.0 - 12.0 %    Eosinophil Rel % 1.5 0.3 - 6.2 %    Basophil Rel % 1.1 0.0 - 1.5 %    Neutrophils Absolute 3.29 1.70 - 7.00 10*3/mm3    Lymphocytes Absolute 3.01 0.70 - 3.10 10*3/mm3    Monocytes Absolute 0.60 0.10 - 0.90 10*3/mm3    Eosinophils Absolute 0.11 0.00 - 0.40 10*3/mm3    Basophils Absolute 0.08 0.00 - 0.20 10*3/mm3    Immature Granulocyte Rel % 0.1 0.0 - 0.5 %    Immature Grans Absolute 0.01 0.00 - 0.05 10*3/mm3    nRBC 0.0 0.0 - 0.2 /100 WBC     Will clean today and redress; add Bactoban--Rx topical    Sees pulm for COPD  Sees vasc    The following portions of the patient's history were reviewed and updated as appropriate: allergies, current medications, past family history, past medical history, past social history, past surgical history and problem list.    Review of Systems    Objective   Physical Exam  Vitals and nursing note reviewed.   Constitutional:       General: She is not in acute distress.     Appearance: She is well-developed. She is not ill-appearing, toxic-appearing or diaphoretic.   HENT:      Head: Normocephalic and atraumatic.      Right Ear: External ear normal.      Left Ear: External ear normal.      Nose: Nose normal.      Mouth/Throat:      Pharynx: Oropharynx is clear.   Eyes:      General: No  scleral icterus.     Conjunctiva/sclera: Conjunctivae normal.      Pupils: Pupils are equal, round, and reactive to light.   Neck:      Thyroid: No thyromegaly.      Vascular: Carotid bruit present.   Cardiovascular:      Rate and Rhythm: Normal rate and regular rhythm.      Pulses: Normal pulses.      Heart sounds: Normal heart sounds. No murmur heard.     Pulmonary:      Effort: Pulmonary effort is normal. No respiratory distress.      Breath sounds: Wheezing present. No rales.   Musculoskeletal:         General: Swelling and tenderness present. No deformity. Normal range of motion.      Cervical back: Normal range of motion and neck supple.      Right lower leg: No edema.      Left lower leg: No edema.      Comments: Decreased pedal pulses 1+=   Skin:     General: Skin is warm and dry.      Coloration: Skin is not jaundiced.      Findings: Lesion present. No rash.      Comments: Wound right post calf; see photo; local redness; very sore; no d/c; slow healing   Neurological:      General: No focal deficit present.      Mental Status: She is alert and oriented to person, place, and time. Mental status is at baseline.      Coordination: Coordination normal.   Psychiatric:         Mood and Affect: Mood normal.         Behavior: Behavior normal.         Thought Content: Thought content normal.         Judgment: Judgment normal.         Assessment/Plan   Diagnoses and all orders for this visit:    1. PAD (peripheral artery disease) (CMS/HCC) (Primary)    2. Cellulitis of left lower leg  -     doxycycline (MONODOX) 100 MG capsule; Take 1 capsule by mouth 2 (Two) Times a Day for 10 days.  Dispense: 20 capsule; Refill: 0  -     Ambulatory Referral to Wound Clinic    3. Essential hypertension    4. Left carotid artery stenosis    5. Mixed hyperlipidemia    6. Tobacco abuse    7. Low folic acid    8. Vitamin D deficiency    Other orders  -     fluconazole (Diflucan) 150 MG tablet; Take 1 tablet by mouth 1 (One) Time for 1  dose. One PO X 1 for yeast infection  Dispense: 1 tablet; Refill: 2  -     mupirocin (Bactroban) 2 % ointment; Apply  topically to the appropriate area as directed 3 (Three) Times a Day. To wound  Dispense: 30 g; Refill: 1    Plan, Nelsy BLANKA TrevinoStokes, was seen today.  she was seen for HTN and continue medication, GERD and will continue on PPI medication, Hyperlipidemia and will continue current medication and Vitamin D deficiency and supplemented.  Labs in 6 mos  Need f/u vascular for PAD; carotid disease  See cardio  Stop smoking  Sees pulm for COPD  Doxy another 10 days ---wound on vac; trauma at [facility] 7-1-21; went to  and Rx Doxy 7-9-21  Bactroban oint TID  F/u Friday; if not better, refer wound care

## 2021-07-23 ENCOUNTER — PRIOR AUTHORIZATION (OUTPATIENT)
Dept: FAMILY MEDICINE CLINIC | Facility: CLINIC | Age: 66
End: 2021-07-23

## 2021-07-23 ENCOUNTER — OFFICE VISIT (OUTPATIENT)
Dept: FAMILY MEDICINE CLINIC | Facility: CLINIC | Age: 66
End: 2021-07-23

## 2021-07-23 VITALS
HEIGHT: 68 IN | TEMPERATURE: 97.4 F | OXYGEN SATURATION: 96 % | HEART RATE: 68 BPM | SYSTOLIC BLOOD PRESSURE: 130 MMHG | WEIGHT: 166 LBS | BODY MASS INDEX: 25.16 KG/M2 | RESPIRATION RATE: 18 BRPM | DIASTOLIC BLOOD PRESSURE: 70 MMHG

## 2021-07-23 DIAGNOSIS — L03.115 CELLULITIS OF RIGHT LOWER EXTREMITY: Primary | ICD-10-CM

## 2021-07-23 PROCEDURE — 99213 OFFICE O/P EST LOW 20 MIN: CPT | Performed by: PHYSICIAN ASSISTANT

## 2021-07-23 NOTE — PROGRESS NOTES
"Subjective   Nelsy Stokes is a 66 y.o. female.     History of Present Illness   Nelsy Stokes 66 y.o. female /70   Pulse 68   Temp 97.4 °F (36.3 °C)   Resp 18   Ht 172.7 cm (67.99\")   Wt 75.3 kg (166 lb)   LMP  (LMP Unknown)   SpO2 96%   BMI 25.25 kg/m²  who presents today for cellulitis leg right f/u  she has a history of   Patient Active Problem List   Diagnosis   • Asymptomatic microscopic hematuria   • Subclavian arterial stenosis (CMS/HCC)   • PAD (peripheral artery disease) (CMS/HCC)   • Bilateral carotid artery disease (CMS/HCC)   • Mixed hyperlipidemia   • Osteopenia of multiple sites   • Aortic atherosclerosis (CMS/HCC)   • Left carotid artery stenosis   • Essential hypertension   • Gastroesophageal reflux disease   • Impaired fasting glucose   • Claudication (CMS/HCC)   • Tobacco abuse   • Low folic acid   • Nausea   • Biliary dyskinesia   • Calculus of gallbladder with chronic cholecystitis without obstruction   • Umbilical hernia without obstruction and without gangrene   .      Wound right post leg shrinking in and less pain; still draining. Needs debriding.  I want her to go to Wound Care  I saw her 7-20-21.  Added Bactoban oint and re round Doxy; helping      Topical has really helped pain. Bactroban  The following portions of the patient's history were reviewed and updated as appropriate: allergies, current medications, past family history, past medical history, past social history, past surgical history and problem list.    Review of Systems   Constitutional: Negative for activity change, appetite change and unexpected weight change.   HENT: Negative for nosebleeds and trouble swallowing.    Eyes: Negative for pain and visual disturbance.   Respiratory: Negative for chest tightness, shortness of breath and wheezing.    Cardiovascular: Positive for leg swelling. Negative for chest pain and palpitations.   Gastrointestinal: Negative for abdominal pain and blood in stool. "   Endocrine: Negative.    Genitourinary: Negative for difficulty urinating and hematuria.   Musculoskeletal: Negative for joint swelling.   Skin: Negative for color change and rash.   Allergic/Immunologic: Negative.    Neurological: Negative for syncope and speech difficulty.   Hematological: Negative for adenopathy.   Psychiatric/Behavioral: Negative for agitation and confusion.   All other systems reviewed and are negative.      Objective   Physical Exam  Vitals and nursing note reviewed.   Constitutional:       General: She is not in acute distress.     Appearance: She is well-developed. She is not ill-appearing, toxic-appearing or diaphoretic.   HENT:      Head: Normocephalic and atraumatic.      Right Ear: External ear normal.      Left Ear: External ear normal.      Nose: Nose normal.      Mouth/Throat:      Pharynx: Oropharynx is clear.   Eyes:      General: No scleral icterus.     Conjunctiva/sclera: Conjunctivae normal.      Pupils: Pupils are equal, round, and reactive to light.   Neck:      Thyroid: No thyromegaly.   Cardiovascular:      Rate and Rhythm: Normal rate and regular rhythm.      Pulses: Normal pulses.      Heart sounds: Normal heart sounds. No murmur heard.     Pulmonary:      Effort: Pulmonary effort is normal. No respiratory distress.   Musculoskeletal:         General: Swelling and tenderness present. No deformity. Normal range of motion.      Cervical back: Normal range of motion and neck supple.      Right lower leg: No edema.      Left lower leg: No edema.      Comments: Decreased pedal pulses 1+=   Skin:     General: Skin is warm and dry.      Coloration: Skin is not jaundiced.      Findings: Lesion present. No rash.      Comments: Wound right post calf; see photo; local redness; very sore; some new granulation; still needs debriding   Neurological:      General: No focal deficit present.      Mental Status: She is alert and oriented to person, place, and time. Mental status is at  baseline.      Coordination: Coordination normal.   Psychiatric:         Mood and Affect: Mood normal.         Behavior: Behavior normal.         Thought Content: Thought content normal.         Judgment: Judgment normal.         Assessment/Plan   Diagnoses and all orders for this visit:    1. Cellulitis of right lower extremity (Primary)        Cont Bactroban and taking doxy; clean and dress twice daily  Do want her to go to wound care ASAP---needs debriding and she has PAD.  Several concerns about healing.  She wants to clean at home. I offered to clean and debride

## 2021-07-23 NOTE — PATIENT INSTRUCTIONS
Cellulitis, Adult    Cellulitis is a skin infection. The infected area is usually warm, red, swollen, and tender. This condition occurs most often in the arms and lower legs. The infection can travel to the muscles, blood, and underlying tissue and become serious. It is very important to get treated for this condition.  What are the causes?  Cellulitis is caused by bacteria. The bacteria enter through a break in the skin, such as a cut, burn, insect bite, open sore, or crack.  What increases the risk?  This condition is more likely to occur in people who:  · Have a weak body defense system (immune system).  · Have open wounds on the skin, such as cuts, burns, bites, and scrapes. Bacteria can enter the body through these open wounds.  · Are older than 60 years of age.  · Have diabetes.  · Have a type of long-lasting (chronic) liver disease (cirrhosis) or kidney disease.  · Are obese.  · Have a skin condition such as:  ? Itchy rash (eczema).  ? Slow movement of blood in the veins (venous stasis).  ? Fluid buildup below the skin (edema).  · Have had radiation therapy.  · Use IV drugs.  What are the signs or symptoms?  Symptoms of this condition include:  · Redness, streaking, or spotting on the skin.  · Swollen area of the skin.  · Tenderness or pain when an area of the skin is touched.  · Warm skin.  · A fever.  · Chills.  · Blisters.  How is this diagnosed?  This condition is diagnosed based on a medical history and physical exam. You may also have tests, including:  · Blood tests.  · Imaging tests.  How is this treated?  Treatment for this condition may include:  · Medicines, such as antibiotic medicines or medicines to treat allergies (antihistamines).  · Supportive care, such as rest and application of cold or warm cloths (compresses) to the skin.  · Hospital care, if the condition is severe.  The infection usually starts to get better within 1-2 days of treatment.  Follow these instructions at  home:    Medicines  · Take over-the-counter and prescription medicines only as told by your health care provider.  · If you were prescribed an antibiotic medicine, take it as told by your health care provider. Do not stop taking the antibiotic even if you start to feel better.  General instructions  · Drink enough fluid to keep your urine pale yellow.  · Do not touch or rub the infected area.  · Raise (elevate) the infected area above the level of your heart while you are sitting or lying down.  · Apply warm or cold compresses to the affected area as told by your health care provider.  · Keep all follow-up visits as told by your health care provider. This is important. These visits let your health care provider make sure a more serious infection is not developing.  Contact a health care provider if:  · You have a fever.  · Your symptoms do not begin to improve within 1-2 days of starting treatment.  · Your bone or joint underneath the infected area becomes painful after the skin has healed.  · Your infection returns in the same area or another area.  · You notice a swollen bump in the infected area.  · You develop new symptoms.  · You have a general ill feeling (malaise) with muscle aches and pains.  Get help right away if:  · Your symptoms get worse.  · You feel very sleepy.  · You develop vomiting or diarrhea that persists.  · You notice red streaks coming from the infected area.  · Your red area gets larger or turns dark in color.  These symptoms may represent a serious problem that is an emergency. Do not wait to see if the symptoms will go away. Get medical help right away. Call your local emergency services (911 in the [place] Do not drive yourself to the hospital.  Summary  · Cellulitis is a skin infection. This condition occurs most often in the arms and lower legs.  · Treatment for this condition may include medicines, such as antibiotic medicines or antihistamines.  · Take over-the-counter and prescription  medicines only as told by your health care provider. If you were prescribed an antibiotic medicine, do not stop taking the antibiotic even if you start to feel better.  · Contact a health care provider if your symptoms do not begin to improve within 1-2 days of starting treatment or your symptoms get worse.  · Keep all follow-up visits as told by your health care provider. This is important. These visits let your health care provider make sure that a more serious infection is not developing.  This information is not intended to replace advice given to you by your health care provider. Make sure you discuss any questions you have with your health care provider.  Document Revised: 12/28/2020 Document Reviewed: 05/09/2019  Elsevier Patient Education © 2021 [employer]

## 2021-07-24 RX ORDER — CHOLECALCIFEROL (VITAMIN D3) 25 MCG
TABLET,CHEWABLE ORAL
Qty: 90 LOZENGE | Refills: 1 | Status: SHIPPED | OUTPATIENT
Start: 2021-07-24 | End: 2022-01-19 | Stop reason: SDUPTHER

## 2021-07-30 ENCOUNTER — OFFICE VISIT (OUTPATIENT)
Dept: WOUND CARE | Facility: HOSPITAL | Age: 66
End: 2021-07-30

## 2021-07-30 PROCEDURE — G0463 HOSPITAL OUTPT CLINIC VISIT: HCPCS

## 2021-07-30 PROCEDURE — 97602 WOUND(S) CARE NON-SELECTIVE: CPT

## 2021-08-04 ENCOUNTER — HOSPITAL ENCOUNTER (OUTPATIENT)
Dept: BONE DENSITY | Facility: HOSPITAL | Age: 66
Discharge: HOME | End: 2021-08-04

## 2021-08-04 ENCOUNTER — HOSPITAL ENCOUNTER (OUTPATIENT)
Dept: MAMMOGRAPHY | Facility: HOSPITAL | Age: 66
Discharge: HOME | End: 2021-08-04

## 2021-08-04 DIAGNOSIS — Z72.0 TOBACCO ABUSE: ICD-10-CM

## 2021-08-04 DIAGNOSIS — I73.9 PAD (PERIPHERAL ARTERY DISEASE) (HCC): Chronic | ICD-10-CM

## 2021-08-04 DIAGNOSIS — E78.2 MIXED HYPERLIPIDEMIA: Chronic | ICD-10-CM

## 2021-08-04 DIAGNOSIS — I70.0 AORTIC ATHEROSCLEROSIS (HCC): Chronic | ICD-10-CM

## 2021-08-04 DIAGNOSIS — Z12.31 ENCOUNTER FOR SCREENING MAMMOGRAM FOR BREAST CANCER: ICD-10-CM

## 2021-08-04 DIAGNOSIS — I10 ESSENTIAL HYPERTENSION: Chronic | ICD-10-CM

## 2021-08-04 DIAGNOSIS — R73.01 IMPAIRED FASTING GLUCOSE: Chronic | ICD-10-CM

## 2021-08-04 DIAGNOSIS — Z78.0 POST-MENOPAUSAL: ICD-10-CM

## 2021-08-04 DIAGNOSIS — I65.22 LEFT CAROTID ARTERY STENOSIS: Chronic | ICD-10-CM

## 2021-08-04 DIAGNOSIS — I65.23 BILATERAL CAROTID ARTERY STENOSIS: Chronic | ICD-10-CM

## 2021-08-04 DIAGNOSIS — E55.9 VITAMIN D DEFICIENCY: Chronic | ICD-10-CM

## 2021-08-04 PROCEDURE — 77067 SCR MAMMO BI INCL CAD: CPT

## 2021-08-04 PROCEDURE — 77063 BREAST TOMOSYNTHESIS BI: CPT

## 2021-08-04 PROCEDURE — 77080 DXA BONE DENSITY AXIAL: CPT

## 2021-08-13 ENCOUNTER — APPOINTMENT (OUTPATIENT)
Dept: WOUND CARE | Facility: HOSPITAL | Age: 66
End: 2021-08-13

## 2021-10-08 ENCOUNTER — OFFICE VISIT (OUTPATIENT)
Dept: CARDIOLOGY | Facility: CLINIC | Age: 66
End: 2021-10-08

## 2021-10-08 VITALS
BODY MASS INDEX: 25.16 KG/M2 | WEIGHT: 166 LBS | HEART RATE: 83 BPM | HEIGHT: 68 IN | DIASTOLIC BLOOD PRESSURE: 80 MMHG | SYSTOLIC BLOOD PRESSURE: 132 MMHG

## 2021-10-08 DIAGNOSIS — I73.9 PAD (PERIPHERAL ARTERY DISEASE) (HCC): Chronic | ICD-10-CM

## 2021-10-08 DIAGNOSIS — I70.0 AORTIC ATHEROSCLEROSIS (HCC): Primary | Chronic | ICD-10-CM

## 2021-10-08 DIAGNOSIS — I77.1 SUBCLAVIAN ARTERIAL STENOSIS (HCC): Chronic | ICD-10-CM

## 2021-10-08 DIAGNOSIS — I65.23 BILATERAL CAROTID ARTERY STENOSIS: Chronic | ICD-10-CM

## 2021-10-08 PROCEDURE — 93000 ELECTROCARDIOGRAM COMPLETE: CPT | Performed by: INTERNAL MEDICINE

## 2021-10-08 PROCEDURE — 99214 OFFICE O/P EST MOD 30 MIN: CPT | Performed by: INTERNAL MEDICINE

## 2021-10-08 NOTE — PROGRESS NOTES
Subjective:     Encounter Date: 10/08/21        Patient ID: Nelsy Stokes is a 66 y.o. female.    Chief Complaint: SOB  History of Present Illness    Dear Dr. Leong,    I had the pleasure of seeing this patient in the office today for f/u.  She has a history of fairly diffuse peripheral arterial disease.  She has bilateral cerebrovascular disease, subclavian artery disease with subclavian steal syndrome, abdominal aortic moderate to severe arteriosclerotic disease.  She also had a stress test that was borderline abnormal.    Overall she says she has been doing well from a heart standpoint.  No angina pectoris.  No chest pain with activity.  Extremely rarely she will have a little bit of soreness on the left side of her chest but is not associate with any activity or exercise.    She is mainly concerned because they told her this stenosis in her left subclavian is getting worse.  They report that on her ultrasound they did demonstrate progression of her disease.  She is having soreness fairly frequently on the left arm when she uses it.    She continues to smoke but she is working hard to stop.  She is down to half a pack a day, although she is concerned that she is gaining weight.      The following portions of the patient's history were reviewed and updated as appropriate: allergies, current medications, past family history, past medical history, past social history, past surgical history and problem list.    Past Medical History:   Diagnosis Date   • Acid reflux    • Anxiety    • Aortic atherosclerosis (HCC) 10/23/2018   • Asthma    • Carotid artery disease (HCC)    • Carotid artery stenosis     left   • COPD (chronic obstructive pulmonary disease) ([facility])    • Coronary artery disease    • Gallbladder disease    • GERD (gastroesophageal reflux disease)    • History of bradycardia    • History of chest pain     ECHO AND STRESS TEST IN EPIC FROM 2/2020   • History of colon polyps    • Hyperlipidemia    •  Hypertension    • Nausea    • PONV (postoperative nausea and vomiting)    • PVD (peripheral vascular disease) (HCC)    • Subclavian artery stenosis (HCC)    • Tobacco abuse    • Umbilical hernia        Past Surgical History:   Procedure Laterality Date   • CAROTID STENT Right 2018   • CHOLECYSTECTOMY WITH INTRAOPERATIVE CHOLANGIOGRAM N/A 11/18/2020    Procedure: LAPAROSCOPIC CHOLECYSTECTOMY WITH INTRAOPERATIVE CHOLANGIOGRAM, OPEN UMBILICAL HERNIA REPAIR;  Surgeon: Maria D Ariza MD;  Location: [facility] MAIN OR;  Service: General;  Laterality: N/A;   • COLONOSCOPY N/A 2017   • ENDOSCOPY N/A 10/9/2020    Procedure: ESOPHAGOGASTRODUODENOSCOPY WITH COLD BIOPSIES;  Surgeon: Jacque Huber MD;  Location: [facility] ENDOSCOPY;  Service: Gastroenterology;  Laterality: N/A;  PRE: REFLUX  POST: ESOPHAGITIS, GASTRITIS, DUODENITIS   • EXTRACORPOREAL SHOCK WAVE LITHOTRIPSY (ESWL)  09/09/2020    [facility] IN [place], [place]   • KNEE CARTILAGE SURGERY Right 1990s       Social History     Socioeconomic History   • Marital status:      Spouse name: Not on file   • Number of children: Not on file   • Years of education: Not on file   • Highest education level: Not on file   Tobacco Use   • Smoking status: Current Every Day Smoker     Packs/day: 1.00     Types: Cigarettes   • Smokeless tobacco: Never Used   • Tobacco comment: caff use: 3 cups daily.  Began smoking at age 15.  Smoked .75 ppd for 7 years and otherwise 1 ppd for a 48.25 pack year history.   Vaping Use   • Vaping Use: Never used   Substance and Sexual Activity   • Alcohol use: Not Currently     Comment: SOCIALLY   • Drug use: No   • Sexual activity: Yes     Partners: Male     Comment:            ECG 12 Lead    Date/Time: 10/8/2021 4:04 PM  Performed by: Julito Bueno III, MD  Authorized by: Julito Bueno III, MD   Comparison: compared with previous ECG   Similar to previous ECG  Rhythm: sinus rhythm  Rate: normal  Conduction: conduction normal  ST  "Segments: ST segments normal  T Waves: T waves normal  QRS axis: normal  Other: no other findings    Clinical impression: normal ECG               Objective:     Vitals:    10/08/21 1437   BP: 132/80   Pulse: 83   Weight: 75.3 kg (166 lb)   Height: 172.7 cm (67.99\")         Physical Exam  Constitutional:       General: She is not in acute distress.     Appearance: She is well-developed. She is not diaphoretic.   HENT:      Head: Normocephalic and atraumatic.      Nose: Nose normal.   Eyes:      General:         Right eye: No discharge.         Left eye: No discharge.      Conjunctiva/sclera: Conjunctivae normal.      Pupils: Pupils are equal, round, and reactive to light.   Neck:      Thyroid: No thyromegaly.      Trachea: No tracheal deviation.   Cardiovascular:      Rate and Rhythm: Normal rate and regular rhythm.      Pulses: Normal pulses.      Heart sounds: Normal heart sounds, S1 normal and S2 normal. No S3 sounds.    Pulmonary:      Effort: Pulmonary effort is normal. No respiratory distress.      Breath sounds: Normal breath sounds. No stridor.   Chest:      Chest wall: No tenderness.   Abdominal:      General: Bowel sounds are normal. There is no distension.      Palpations: Abdomen is soft. There is no mass.      Tenderness: There is no abdominal tenderness. There is no guarding or rebound.   Musculoskeletal:         General: No tenderness or deformity. Normal range of motion.      Cervical back: Normal range of motion and neck supple.   Lymphadenopathy:      Cervical: No cervical adenopathy.   Skin:     General: Skin is warm and dry.      Findings: No erythema or rash.   Neurological:      Mental Status: She is alert and oriented to person, place, and time.      Deep Tendon Reflexes: Reflexes are normal and symmetric.   Psychiatric:         Thought Content: Thought content normal.         Lab Review:             Lab Results   Component Value Date    GLUCOSE 99 11/12/2020    BUN 11 06/14/2021    CREATININE " 1.02 (H) 06/14/2021    EGFRIFNONA 54 (L) 06/14/2021    EGFRIFAFRI 66 06/14/2021    BCR 10.8 06/14/2021    K 4.8 06/14/2021    CO2 27.0 06/14/2021    CALCIUM 9.5 06/14/2021    PROTENTOTREF 6.7 06/14/2021    ALBUMIN 4.80 06/14/2021    LABIL2 2.5 06/14/2021    AST 14 06/14/2021    ALT 18 06/14/2021         Stress 2/2020  Interpretation Summary    · Myocardial perfusion imaging indicates a very small amount of mild ischemia in the distal inferior wall.  · Left ventricular ejection fraction is hyperdynamic (Calculated EF > 70%). The left ventricle is small.  · Impressions are consistent with a low risk study.        Results for orders placed during the hospital encounter of 02/27/20    Adult Transthoracic Echo Complete W/ Cont if Necessary Per Protocol    Interpretation Summary  · Left ventricular systolic function is normal.  · Calculated EF = 62%.          Assessment:          Diagnosis Plan   1. Aortic atherosclerosis ([facility])  ECG 12 Lead   2. Bilateral carotid artery stenosis  ECG 12 Lead   3. PAD (peripheral artery disease) ([facility])  ECG 12 Lead   4. Subclavian arterial stenosis ([facility])  ECG 12 Lead          Plan:       1.  Potential CAD, low risk and borderline stress test, no symptoms angina pectoris, continue to focus on risk factor modification, I will see back in a year  2.  Tobacco abuse-smoking cessation is recommended  3.  Peripheral vascular disease-risk factor modification and medical therapy    Thank you very much for allowing us to participate in the care of this pleasant patient.  Please don't hesitate to call if I can be of assistance in any way.      Current Outpatient Medications:   •  albuterol sulfate  (90 Base) MCG/ACT inhaler, Inhale 2 puffs Every 4 (Four) Hours As Needed for Wheezing., Disp: 1 inhaler, Rfl: 11  •  aspirin 81 MG EC tablet, Take 81 mg by mouth Daily. Instructed pt to follow md instructions regarding holding for surgery, Disp: , Rfl:   •  Cyanocobalamin (B-12) 1000 MCG lozenge,  PLACE ONE TABLET UNDER THE TONGUE DAILY (Patient taking differently: Take 1 tablet by mouth Every Other Day.), Disp: 90 lozenge, Rfl: 1  •  ezetimibe (ZETIA) 10 MG tablet, Take 1 tablet by mouth Daily. For cholesterol, Disp: 90 tablet, Rfl: 1  •  Fluticasone Furoate-Vilanterol (BREO ELLIPTA IN), Inhale., Disp: , Rfl:   •  fluticasone-salmeterol (ADVAIR) 250-50 MCG/DOSE DISKUS, Inhale 2 puffs 2 (Two) Times a Day., Disp: , Rfl:   •  folic acid (FOLVITE) 1 MG tablet, Take 1 tablet by mouth Daily., Disp: 90 tablet, Rfl: 1  •  mupirocin (Bactroban) 2 % ointment, Apply  topically to the appropriate area as directed 3 (Three) Times a Day. To wound, Disp: 30 g, Rfl: 1  •  pantoprazole (Protonix) 40 MG EC tablet, One PO daily for GERD, Disp: 90 tablet, Rfl: 1  •  rosuvastatin (Crestor) 5 MG tablet, 1 PO 2-3 times a week For cholesterol, Disp: 30 tablet, Rfl: 11  •  valsartan (DIOVAN) 320 MG tablet, Take 1 tablet by mouth Daily. for HTN, Disp: 90 tablet, Rfl: 1         EMR Dragon/Transcription disclaimer:    Much of this encounter note is an electronic transcription/translation of spoken language to printed text. The electronic translation of spoken language may permit erroneous, or at times, nonsensical words or phrases to be inadvertently transcribed; Although I have reviewed the note for such errors, some may still exist.

## 2021-10-17 RX ORDER — PREDNISONE 20 MG/1
20 TABLET ORAL 2 TIMES DAILY
Qty: 10 TABLET | Refills: 0 | Status: SHIPPED | OUTPATIENT
Start: 2021-10-17 | End: 2022-01-19 | Stop reason: SDDI

## 2021-10-22 DIAGNOSIS — E53.8 LOW FOLIC ACID: ICD-10-CM

## 2021-10-22 DIAGNOSIS — E78.2 MIXED HYPERLIPIDEMIA: ICD-10-CM

## 2021-10-22 DIAGNOSIS — R73.01 IMPAIRED FASTING GLUCOSE: ICD-10-CM

## 2021-10-22 DIAGNOSIS — I73.9 PAD (PERIPHERAL ARTERY DISEASE) (HCC): Primary | ICD-10-CM

## 2021-10-22 DIAGNOSIS — E55.9 VITAMIN D DEFICIENCY: ICD-10-CM

## 2021-10-22 DIAGNOSIS — I10 ESSENTIAL HYPERTENSION: ICD-10-CM

## 2021-11-11 ENCOUNTER — OFFICE VISIT (OUTPATIENT)
Dept: FAMILY MEDICINE CLINIC | Facility: CLINIC | Age: 66
End: 2021-11-11

## 2021-11-11 ENCOUNTER — TELEPHONE (OUTPATIENT)
Dept: FAMILY MEDICINE CLINIC | Facility: CLINIC | Age: 66
End: 2021-11-11

## 2021-11-11 VITALS
WEIGHT: 166 LBS | OXYGEN SATURATION: 94 % | HEIGHT: 68 IN | TEMPERATURE: 97.1 F | BODY MASS INDEX: 25.16 KG/M2 | HEART RATE: 109 BPM | RESPIRATION RATE: 24 BRPM

## 2021-11-11 DIAGNOSIS — R05.9 COUGH: Primary | ICD-10-CM

## 2021-11-11 DIAGNOSIS — R06.02 SHORTNESS OF BREATH: ICD-10-CM

## 2021-11-11 DIAGNOSIS — J44.9 CHRONIC OBSTRUCTIVE PULMONARY DISEASE, UNSPECIFIED COPD TYPE (HCC): ICD-10-CM

## 2021-11-11 DIAGNOSIS — Z72.0 TOBACCO ABUSE: Primary | ICD-10-CM

## 2021-11-11 DIAGNOSIS — R05.9 COUGH: ICD-10-CM

## 2021-11-11 PROCEDURE — 71046 X-RAY EXAM CHEST 2 VIEWS: CPT | Performed by: NURSE PRACTITIONER

## 2021-11-11 PROCEDURE — 99213 OFFICE O/P EST LOW 20 MIN: CPT | Performed by: NURSE PRACTITIONER

## 2021-11-11 NOTE — PROGRESS NOTES
Subjective   Nelsy Stokes is a 66 y.o. female.     History of Present Illness   Nelsy Stokes 66 y.o. female who presents for evaluation of cough. Symptoms include dry cough, shortness of breath and chest tightness.  Onset of symptoms was 4 weeks ago, unchanged since that time. Patient denies fever.   Evaluation to date: was seen at an urgent care center Treatment to date:  augmentin and prednisone. states she noticed improvement for a short time after but no resolution. She was also treated for thrush which she is not sure resolved either. She is current smoker and has COPD which is managed by her pulmonologist.  She saw her pulmonologist in February and had spiriva discontinued and was started on Breztri.  She states she was also on Advair at her last appt and was not told to stop using it when Breztri was started so she has been using both.    She is UTD on her CT low dose for lung cancer screen.     The following portions of the patient's history were reviewed and updated as appropriate: allergies, current medications, past family history, past medical history, past social history, past surgical history and problem list.    Review of Systems   Constitutional: Negative for chills, fever and unexpected weight change.   Respiratory: Positive for cough, chest tightness and shortness of breath.    Cardiovascular: Negative for chest pain and palpitations.   Psychiatric/Behavioral: Negative for behavioral problems.       Objective   Physical Exam  Vitals and nursing note reviewed.   Constitutional:       Appearance: She is well-developed.   Cardiovascular:      Rate and Rhythm: Normal rate and regular rhythm.   Pulmonary:      Effort: Pulmonary effort is normal.      Breath sounds: Examination of the right-lower field reveals decreased breath sounds. Examination of the left-lower field reveals decreased breath sounds. Decreased breath sounds present.   Neurological:      Mental Status: She is alert and oriented  to person, place, and time.   Psychiatric:         Mood and Affect: Mood normal.         Behavior: Behavior normal.         Thought Content: Thought content normal.         Judgment: Judgment normal.     2 view chest xray ordered and reviewed by me. No opacity or infiltrate noted on xray. No comparison on file.     Assessment/Plan   Diagnoses and all orders for this visit:    1. Cough (Primary)  -     XR Chest PA & Lateral (In Office)    2. Shortness of breath  -     XR Chest PA & Lateral (In Office)    3. Chronic obstructive pulmonary disease, unspecified COPD type (HCC)  -     Fluticasone-Umeclidin-Vilant (Trelegy Ellipta) 100-62.5-25 MCG/INH inhaler; Inhale 1 puff Daily.  Dispense: 60 each; Refill: 5    Other orders  -     nystatin (MYCOSTATIN) 100,000 unit/mL suspension; Swish and swallow 5 mL 4 (Four) Times a Day.  Dispense: 473 mL; Refill: 0          Patient will stop Advair use.  She has not noticed much difference on Breztri and wants to try Trelegy. She will also stop Breztri and start Trelegy if covered.

## 2021-11-11 NOTE — TELEPHONE ENCOUNTER
----- Message from Gretchen Sinha LPN sent at 11/11/2021  4:36 PM EST -----  Regarding: FW: JOE    ----- Message -----  From: Nelsy Stokes  Sent: 11/11/2021   3:21 PM EST  To: [facility]  Subject: JOE                                               Not any time soon. Not sure if I remember his name to even call him. If you have it and can send it to me I will call him.

## 2022-01-06 LAB
25(OH)D3+25(OH)D2 SERPL-MCNC: 27.4 NG/ML (ref 30–100)
ALBUMIN SERPL-MCNC: 4.6 G/DL (ref 3.8–4.8)
ALBUMIN/GLOB SERPL: 2 {RATIO} (ref 1.2–2.2)
ALP SERPL-CCNC: 87 IU/L (ref 44–121)
ALT SERPL-CCNC: 20 IU/L (ref 0–32)
AST SERPL-CCNC: 17 IU/L (ref 0–40)
BASOPHILS # BLD AUTO: 0.1 X10E3/UL (ref 0–0.2)
BASOPHILS NFR BLD AUTO: 1 %
BILIRUB SERPL-MCNC: 0.8 MG/DL (ref 0–1.2)
BUN SERPL-MCNC: 10 MG/DL (ref 8–27)
BUN/CREAT SERPL: 9 (ref 12–28)
CALCIUM SERPL-MCNC: 9.8 MG/DL (ref 8.7–10.3)
CHLORIDE SERPL-SCNC: 102 MMOL/L (ref 96–106)
CHOLEST SERPL-MCNC: 193 MG/DL (ref 100–199)
CO2 SERPL-SCNC: 24 MMOL/L (ref 20–29)
CREAT SERPL-MCNC: 1.08 MG/DL (ref 0.57–1)
EOSINOPHIL # BLD AUTO: 0.2 X10E3/UL (ref 0–0.4)
EOSINOPHIL NFR BLD AUTO: 3 %
ERYTHROCYTE [DISTWIDTH] IN BLOOD BY AUTOMATED COUNT: 12.8 % (ref 11.7–15.4)
GLOBULIN SER CALC-MCNC: 2.3 G/DL (ref 1.5–4.5)
GLUCOSE SERPL-MCNC: 103 MG/DL (ref 65–99)
HBA1C MFR BLD: 6.1 % (ref 4.8–5.6)
HCT VFR BLD AUTO: 48.8 % (ref 34–46.6)
HDLC SERPL-MCNC: 56 MG/DL
HGB BLD-MCNC: 16.1 G/DL (ref 11.1–15.9)
IMM GRANULOCYTES # BLD AUTO: 0 X10E3/UL (ref 0–0.1)
IMM GRANULOCYTES NFR BLD AUTO: 0 %
LDLC SERPL CALC-MCNC: 123 MG/DL (ref 0–99)
LYMPHOCYTES # BLD AUTO: 3.1 X10E3/UL (ref 0.7–3.1)
LYMPHOCYTES NFR BLD AUTO: 45 %
MCH RBC QN AUTO: 30.5 PG (ref 26.6–33)
MCHC RBC AUTO-ENTMCNC: 33 G/DL (ref 31.5–35.7)
MCV RBC AUTO: 92 FL (ref 79–97)
MONOCYTES # BLD AUTO: 0.6 X10E3/UL (ref 0.1–0.9)
MONOCYTES NFR BLD AUTO: 8 %
NEUTROPHILS # BLD AUTO: 3 X10E3/UL (ref 1.4–7)
NEUTROPHILS NFR BLD AUTO: 43 %
PLATELET # BLD AUTO: 266 X10E3/UL (ref 150–450)
POTASSIUM SERPL-SCNC: 5 MMOL/L (ref 3.5–5.2)
PROT SERPL-MCNC: 6.9 G/DL (ref 6–8.5)
RBC # BLD AUTO: 5.28 X10E6/UL (ref 3.77–5.28)
SODIUM SERPL-SCNC: 140 MMOL/L (ref 134–144)
T3FREE SERPL-MCNC: 3.2 PG/ML (ref 2–4.4)
T4 FREE SERPL-MCNC: 1.17 NG/DL (ref 0.82–1.77)
TRIGL SERPL-MCNC: 74 MG/DL (ref 0–149)
TSH SERPL DL<=0.005 MIU/L-ACNC: 2.33 UIU/ML (ref 0.45–4.5)
VLDLC SERPL CALC-MCNC: 14 MG/DL (ref 5–40)
WBC # BLD AUTO: 6.9 X10E3/UL (ref 3.4–10.8)

## 2022-01-19 ENCOUNTER — OFFICE VISIT (OUTPATIENT)
Dept: FAMILY MEDICINE CLINIC | Facility: CLINIC | Age: 67
End: 2022-01-19

## 2022-01-19 VITALS
HEART RATE: 95 BPM | TEMPERATURE: 97.5 F | WEIGHT: 168.8 LBS | BODY MASS INDEX: 25.58 KG/M2 | SYSTOLIC BLOOD PRESSURE: 110 MMHG | DIASTOLIC BLOOD PRESSURE: 62 MMHG | RESPIRATION RATE: 16 BRPM | OXYGEN SATURATION: 96 % | HEIGHT: 68 IN

## 2022-01-19 DIAGNOSIS — Z72.0 TOBACCO ABUSE: ICD-10-CM

## 2022-01-19 DIAGNOSIS — R73.01 IMPAIRED FASTING GLUCOSE: ICD-10-CM

## 2022-01-19 DIAGNOSIS — I65.23 BILATERAL CAROTID ARTERY STENOSIS: ICD-10-CM

## 2022-01-19 DIAGNOSIS — N18.31 STAGE 3A CHRONIC KIDNEY DISEASE: ICD-10-CM

## 2022-01-19 DIAGNOSIS — I70.0 AORTIC ATHEROSCLEROSIS: ICD-10-CM

## 2022-01-19 DIAGNOSIS — I10 ESSENTIAL HYPERTENSION: Primary | ICD-10-CM

## 2022-01-19 DIAGNOSIS — E78.2 MIXED HYPERLIPIDEMIA: ICD-10-CM

## 2022-01-19 DIAGNOSIS — E53.8 LOW FOLIC ACID: ICD-10-CM

## 2022-01-19 DIAGNOSIS — K21.9 GASTROESOPHAGEAL REFLUX DISEASE, UNSPECIFIED WHETHER ESOPHAGITIS PRESENT: ICD-10-CM

## 2022-01-19 PROCEDURE — 99214 OFFICE O/P EST MOD 30 MIN: CPT | Performed by: PHYSICIAN ASSISTANT

## 2022-01-19 RX ORDER — FOLIC ACID 1 MG/1
1 TABLET ORAL DAILY
Qty: 90 TABLET | Refills: 1 | Status: SHIPPED | OUTPATIENT
Start: 2022-01-19 | End: 2022-07-06 | Stop reason: SDUPTHER

## 2022-01-19 RX ORDER — VALSARTAN 320 MG/1
320 TABLET ORAL DAILY
Qty: 90 TABLET | Refills: 1 | Status: SHIPPED | OUTPATIENT
Start: 2022-01-19 | End: 2022-07-06 | Stop reason: SDUPTHER

## 2022-01-19 RX ORDER — CHOLECALCIFEROL (VITAMIN D3) 25 MCG
1 TABLET,CHEWABLE ORAL EVERY OTHER DAY
Qty: 90 EACH | Refills: 3 | Status: SHIPPED | OUTPATIENT
Start: 2022-01-19 | End: 2023-01-15

## 2022-01-19 RX ORDER — PANTOPRAZOLE SODIUM 40 MG/1
TABLET, DELAYED RELEASE ORAL
Qty: 90 TABLET | Refills: 1 | Status: SHIPPED | OUTPATIENT
Start: 2022-01-19

## 2022-01-19 RX ORDER — EZETIMIBE 10 MG/1
10 TABLET ORAL DAILY
Qty: 90 TABLET | Refills: 1 | Status: SHIPPED | OUTPATIENT
Start: 2022-01-19 | End: 2022-07-06 | Stop reason: SDUPTHER

## 2022-01-19 RX ORDER — ROSUVASTATIN CALCIUM 5 MG/1
TABLET, COATED ORAL
Qty: 30 TABLET | Refills: 11 | Status: SHIPPED | OUTPATIENT
Start: 2022-01-19 | End: 2022-06-08

## 2022-01-19 NOTE — PROGRESS NOTES
Subjective   Nelsy Stokes is a 66 y.o. female.     History of Present Illness      Since the last visit, she has overall felt fairly well.  She has Primary Hypertension and well controlled on current medication, Impaired fasting glucose and will monitor labs to watch for DMII, Vitamin D deficiency and will update labs for continued management and mixed hyperlipidemia---need LDL <70.  she has been compliant with current medications have reviewed them.  The patient denies medication side effects.  Will refill medications. LMP  (LMP Unknown)   Labs show low Vitamin D levels.  I want you to add OTC Vitamin D 2,000 I.U. Daily.  Fosamax --abd pain;  DEXA osteopenia FRAX score ok    Results for orders placed or performed in visit on 10/22/21   Comprehensive metabolic panel    Specimen: Blood   Result Value Ref Range    Glucose 103 (H) 65 - 99 mg/dL    BUN 10 8 - 27 mg/dL    Creatinine 1.08 (H) 0.57 - 1.00 mg/dL    eGFR Non African Am 54 (L) >59 mL/min/1.73    eGFR African Am 62 >59 mL/min/1.73    BUN/Creatinine Ratio 9 (L) 12 - 28    Sodium 140 134 - 144 mmol/L    Potassium 5.0 3.5 - 5.2 mmol/L    Chloride 102 96 - 106 mmol/L    Total CO2 24 20 - 29 mmol/L    Calcium 9.8 8.7 - 10.3 mg/dL    Total Protein 6.9 6.0 - 8.5 g/dL    Albumin 4.6 3.8 - 4.8 g/dL    Globulin 2.3 1.5 - 4.5 g/dL    A/G Ratio 2.0 1.2 - 2.2    Total Bilirubin 0.8 0.0 - 1.2 mg/dL    Alkaline Phosphatase 87 44 - 121 IU/L    AST (SGOT) 17 0 - 40 IU/L    ALT (SGPT) 20 0 - 32 IU/L   Lipid panel    Specimen: Blood   Result Value Ref Range    Total Cholesterol 193 100 - 199 mg/dL    Triglycerides 74 0 - 149 mg/dL    HDL Cholesterol 56 >39 mg/dL    VLDL Cholesterol Kale 14 5 - 40 mg/dL    LDL Chol Calc (NIH) 123 (H) 0 - 99 mg/dL   TSH    Specimen: Blood   Result Value Ref Range    TSH 2.330 0.450 - 4.500 uIU/mL   Hemoglobin A1c    Specimen: Blood   Result Value Ref Range    Hemoglobin A1C 6.1 (H) 4.8 - 5.6 %   T4, Free    Specimen: Blood   Result Value Ref  Range    Free T4 1.17 0.82 - 1.77 ng/dL   T3, Free    Specimen: Blood   Result Value Ref Range    T3, Free 3.2 2.0 - 4.4 pg/mL   Vitamin D 25 Hydroxy    Specimen: Blood   Result Value Ref Range    25 Hydroxy, Vitamin D 27.4 (L) 30.0 - 100.0 ng/mL   CBC and Differential    Specimen: Blood   Result Value Ref Range    WBC 6.9 3.4 - 10.8 x10E3/uL    RBC 5.28 3.77 - 5.28 x10E6/uL    Hemoglobin 16.1 (H) 11.1 - 15.9 g/dL    Hematocrit 48.8 (H) 34.0 - 46.6 %    MCV 92 79 - 97 fL    MCH 30.5 26.6 - 33.0 pg    MCHC 33.0 31.5 - 35.7 g/dL    RDW 12.8 11.7 - 15.4 %    Platelets 266 150 - 450 x10E3/uL    Neutrophil Rel % 43 Not Estab. %    Lymphocyte Rel % 45 Not Estab. %    Monocyte Rel % 8 Not Estab. %    Eosinophil Rel % 3 Not Estab. %    Basophil Rel % 1 Not Estab. %    Neutrophils Absolute 3.0 1.4 - 7.0 x10E3/uL    Lymphocytes Absolute 3.1 0.7 - 3.1 x10E3/uL    Monocytes Absolute 0.6 0.1 - 0.9 x10E3/uL    Eosinophils Absolute 0.2 0.0 - 0.4 x10E3/uL    Basophils Absolute 0.1 0.0 - 0.2 x10E3/uL    Immature Granulocyte Rel % 0 Not Estab. %    Immature Grans Absolute 0.0 0.0 - 0.1 x10E3/uL     Labs show low Vitamin D levels.  I want you to add OTC Vitamin D 2,000 I.U. Daily.    Patient intolerant to statins  Did labs 1/5/2022 as above and note her renal functions were impaired and she has consistent KCD 3A and will avoid NSAIDs.    My notes  Sees urologist -----f/u PRN ---had a stone  Sees vasc --Dr Leong for PAD     Sees cardiologist --CAD--Dr Bueno--appointment 10/8/2021 for her aortic atherosclerosis, bilateral carotid artery stenosis, PAD, subclavian arterial stenosis--- he notes that she has:    1.  Potential CAD, low risk and borderline stress test, no symptoms angina pectoris, continue to focus on risk factor modification, I will see back in a year  2.  Tobacco abuse-smoking cessation is recommended  3.  Peripheral vascular disease-risk factor modification and medical therapy  Had cholecystectomy ---feels so much better;   Only PPI occas now  Suggest flu shot    Last vascular appointment I reviewed today was from 8/26/2021 for carotid disease and peripheral artery disease--Dr Leong and stable f/u 1 yr  Patient saw Dr. Broderick Bosch pulmonologist on 2/24/2021 noted stable COPD and that she was intolerant to Spiriva due to headache gave her a sample of a new medication Breztri  to try and ordered alpha-1 test and follow-up 1 year  40 pk yr hx smoking; still smokes  The following portions of the patient's history were reviewed and updated as appropriate: allergies, current medications, past family history, past medical history, past social history, past surgical history and problem list.    Review of Systems   Constitutional: Negative for activity change, appetite change and unexpected weight change.   HENT: Negative for nosebleeds and trouble swallowing.    Eyes: Negative for pain and visual disturbance.   Respiratory: Negative for chest tightness, shortness of breath and wheezing.    Cardiovascular: Negative for chest pain and palpitations.   Gastrointestinal: Negative for abdominal pain and blood in stool.   Endocrine: Negative.    Genitourinary: Negative for difficulty urinating and hematuria.   Musculoskeletal: Negative for joint swelling.   Skin: Negative for color change and rash.   Allergic/Immunologic: Negative.    Neurological: Negative for syncope and speech difficulty.   Hematological: Negative for adenopathy.   Psychiatric/Behavioral: Negative for agitation and confusion.   All other systems reviewed and are negative.      Objective   Physical Exam  Vitals and nursing note reviewed.   Constitutional:       General: She is not in acute distress.     Appearance: She is well-developed. She is not ill-appearing, toxic-appearing or diaphoretic.   HENT:      Head: Normocephalic and atraumatic.      Right Ear: External ear normal.      Left Ear: External ear normal.      Nose: Nose normal.      Mouth/Throat:      Pharynx: Oropharynx  is clear.   Eyes:      General: No scleral icterus.     Conjunctiva/sclera: Conjunctivae normal.      Pupils: Pupils are equal, round, and reactive to light.   Neck:      Thyroid: No thyromegaly.   Cardiovascular:      Rate and Rhythm: Normal rate and regular rhythm.      Pulses: Normal pulses.      Heart sounds: Normal heart sounds. No murmur heard.      Pulmonary:      Effort: Pulmonary effort is normal. No respiratory distress.      Breath sounds: Normal breath sounds.   Musculoskeletal:         General: No deformity. Normal range of motion.      Cervical back: Normal range of motion and neck supple.      Right lower leg: No edema.      Left lower leg: No edema.      Comments: Decreased pedal pulses 1+=   Skin:     General: Skin is warm and dry.      Coloration: Skin is not jaundiced.      Findings: No rash.   Neurological:      General: No focal deficit present.      Mental Status: She is alert and oriented to person, place, and time. Mental status is at baseline.      Coordination: Coordination normal.   Psychiatric:         Mood and Affect: Mood normal.         Behavior: Behavior normal.         Thought Content: Thought content normal.         Judgment: Judgment normal.         Assessment/Plan   Diagnoses and all orders for this visit:    1. Essential hypertension (Primary)    2. Aortic atherosclerosis (HCC)    3. Bilateral carotid artery stenosis    4. Tobacco abuse  -      CT Chest Low Dose Cancer Screening WO; Future    5. Low folic acid    6. Gastroesophageal reflux disease, unspecified whether esophagitis present    7. Impaired fasting glucose    8. Mixed hyperlipidemia    Other orders  -     rosuvastatin (Crestor) 5 MG tablet; 1/2  PO 2-3 times a week For cholesterol  Dispense: 30 tablet; Refill: 11        Plan, Nelsy Stokes, was seen today.  she was seen for HTN and continue medication, Imparied fasting glucose and plan follow up labs, diet, and exercise, GERD and will continue on PPI medication,  Hyperlipidemia and will continue current medication and Vitamin D deficiency and will update labs .  Do take Vit D  Keep statin at 1/2 few times a week and Zetia  Watching renal labs; ---no NSAIDs; CKD 3  Trelogy works!!--COPD  Low dose CT chest  Dr Hernandez for allergy asthma  Dr Selvin Bueno--cardio

## 2022-03-11 ENCOUNTER — HOSPITAL ENCOUNTER (OUTPATIENT)
Dept: CT IMAGING | Facility: HOSPITAL | Age: 67
Discharge: HOME | End: 2022-03-11
Admitting: PHYSICIAN ASSISTANT

## 2022-03-11 DIAGNOSIS — Z72.0 TOBACCO ABUSE: ICD-10-CM

## 2022-03-11 PROCEDURE — 71271 CT THORAX LUNG CANCER SCR C-: CPT

## 2022-03-14 DIAGNOSIS — Z72.0 TOBACCO ABUSE: Primary | ICD-10-CM

## 2022-03-21 ENCOUNTER — TRANSCRIBE ORDERS (OUTPATIENT)
Dept: ADMINISTRATIVE | Facility: HOSPITAL | Age: 67
End: 2022-03-21

## 2022-03-21 DIAGNOSIS — I73.9 PAD (PERIPHERAL ARTERY DISEASE): Primary | ICD-10-CM

## 2022-03-25 ENCOUNTER — HOSPITAL ENCOUNTER (OUTPATIENT)
Dept: CARDIOLOGY | Facility: HOSPITAL | Age: 67
Discharge: HOME | End: 2022-03-25
Admitting: SURGERY

## 2022-03-25 DIAGNOSIS — I73.9 PAD (PERIPHERAL ARTERY DISEASE): ICD-10-CM

## 2022-03-25 PROCEDURE — 93923 UPR/LXTR ART STDY 3+ LVLS: CPT

## 2022-03-25 PROCEDURE — 93924 LWR XTR VASC STDY BILAT: CPT

## 2022-03-26 LAB
BH CV LOWER ARTERIAL LEFT 2ND DIGIT SYS MAX: 76
BH CV LOWER ARTERIAL LEFT 3RD DIGIT SYS MAX: 73
BH CV LOWER ARTERIAL LEFT 4TH DIGIT SYS MAX: 60
BH CV LOWER ARTERIAL LEFT 5TH DIGIT SYS MAX: 72
BH CV LOWER ARTERIAL LEFT ABI RATIO: 0.85
BH CV LOWER ARTERIAL LEFT CALF RATIO: 1.01
BH CV LOWER ARTERIAL LEFT DORSALIS PEDIS SYS MAX: 139
BH CV LOWER ARTERIAL LEFT GREAT TOE SYS MAX: 67
BH CV LOWER ARTERIAL LEFT HIGH THIGH RATIO: 1.15
BH CV LOWER ARTERIAL LEFT HIGH THIGH SYS MAX: 188
BH CV LOWER ARTERIAL LEFT LOW THIGH RATIO: 1.15
BH CV LOWER ARTERIAL LEFT LOW THIGH SYS MAX: 187
BH CV LOWER ARTERIAL LEFT POPLITEAL SYS MAX: 165
BH CV LOWER ARTERIAL LEFT POST EX ABI RATIO: 0.37
BH CV LOWER ARTERIAL LEFT POST TIBIAL SYS MAX: 137
BH CV LOWER ARTERIAL LEFT TBI RATIO: 0.41
BH CV LOWER ARTERIAL RIGHT 2ND DIGIT SYS MAX: 87
BH CV LOWER ARTERIAL RIGHT 3RD DIGIT SYS MAX: 74
BH CV LOWER ARTERIAL RIGHT 4TH DIGIT SYS MAX: 83
BH CV LOWER ARTERIAL RIGHT 5TH DIGIT SYS MAX: 49
BH CV LOWER ARTERIAL RIGHT ABI RATIO: 0.97
BH CV LOWER ARTERIAL RIGHT CALF RATIO: 1.04
BH CV LOWER ARTERIAL RIGHT DORSALIS PEDIS SYS MAX: 157
BH CV LOWER ARTERIAL RIGHT GREAT TOE SYS MAX: 72
BH CV LOWER ARTERIAL RIGHT HIGH THIGH RATIO: NORMAL
BH CV LOWER ARTERIAL RIGHT HIGH THIGH SYS MAX: NORMAL
BH CV LOWER ARTERIAL RIGHT LOW THIGH RATIO: 1.15
BH CV LOWER ARTERIAL RIGHT LOW THIGH SYS MAX: 188
BH CV LOWER ARTERIAL RIGHT POPLITEAL SYS MAX: 170
BH CV LOWER ARTERIAL RIGHT POST EX ABI RATIO: 0.47
BH CV LOWER ARTERIAL RIGHT POST TIBIAL SYS MAX: 158
BH CV LOWER ARTERIAL RIGHT TBI RATIO: 0.44
MAXIMAL PREDICTED HEART RATE: 153 BPM
STRESS TARGET HR: 130 BPM
UPPER ARTERIAL LEFT ARM BRACHIAL SYS MAX: 102
UPPER ARTERIAL RIGHT ARM BRACHIAL SYS MAX: 163

## 2022-06-08 ENCOUNTER — TELEPHONE (OUTPATIENT)
Dept: FAMILY MEDICINE CLINIC | Facility: CLINIC | Age: 67
End: 2022-06-08

## 2022-06-08 NOTE — TELEPHONE ENCOUNTER
----- Message from William Padron MA sent at 6/8/2022  2:27 PM EDT -----  Regarding: FW: medications    ----- Message -----  From: Nelsy Stokes  Sent: 6/8/2022   1:07 PM EDT  To: [facility] Clinical Pool  Subject: medications                                      Gretchen I got all your messages,but I am still having muscle issues taking the Crestor. I as wondering if i could try the Livalo with the Ezetimee.  i know you took me off it because I was coughing , but just thought I would see what you thought.Thank you

## 2022-06-10 ENCOUNTER — PRIOR AUTHORIZATION (OUTPATIENT)
Dept: FAMILY MEDICINE CLINIC | Facility: CLINIC | Age: 67
End: 2022-06-10

## 2022-07-06 ENCOUNTER — OFFICE VISIT (OUTPATIENT)
Dept: FAMILY MEDICINE CLINIC | Facility: CLINIC | Age: 67
End: 2022-07-06

## 2022-07-06 VITALS
HEIGHT: 68 IN | DIASTOLIC BLOOD PRESSURE: 78 MMHG | HEART RATE: 98 BPM | RESPIRATION RATE: 16 BRPM | BODY MASS INDEX: 25.76 KG/M2 | WEIGHT: 170 LBS | SYSTOLIC BLOOD PRESSURE: 115 MMHG | OXYGEN SATURATION: 98 % | TEMPERATURE: 97.5 F

## 2022-07-06 DIAGNOSIS — G57.93 NEUROPATHY OF BOTH FEET: ICD-10-CM

## 2022-07-06 DIAGNOSIS — Z72.0 TOBACCO ABUSE: ICD-10-CM

## 2022-07-06 DIAGNOSIS — I65.23 BILATERAL CAROTID ARTERY STENOSIS: ICD-10-CM

## 2022-07-06 DIAGNOSIS — E78.2 MIXED HYPERLIPIDEMIA: ICD-10-CM

## 2022-07-06 DIAGNOSIS — I10 ESSENTIAL HYPERTENSION: Primary | ICD-10-CM

## 2022-07-06 DIAGNOSIS — J44.9 CHRONIC OBSTRUCTIVE PULMONARY DISEASE, UNSPECIFIED COPD TYPE: ICD-10-CM

## 2022-07-06 DIAGNOSIS — R73.01 IMPAIRED FASTING GLUCOSE: ICD-10-CM

## 2022-07-06 PROCEDURE — 99214 OFFICE O/P EST MOD 30 MIN: CPT | Performed by: PHYSICIAN ASSISTANT

## 2022-07-06 RX ORDER — METFORMIN HYDROCHLORIDE 500 MG/1
TABLET, EXTENDED RELEASE ORAL
Qty: 180 TABLET | Refills: 3 | Status: SHIPPED | OUTPATIENT
Start: 2022-07-06

## 2022-07-06 RX ORDER — VALSARTAN 320 MG/1
320 TABLET ORAL DAILY
Qty: 90 TABLET | Refills: 1 | Status: SHIPPED | OUTPATIENT
Start: 2022-07-06 | End: 2022-10-27 | Stop reason: SDUPTHER

## 2022-07-06 RX ORDER — FOLIC ACID 1 MG/1
1 TABLET ORAL DAILY
Qty: 90 TABLET | Refills: 1 | Status: SHIPPED | OUTPATIENT
Start: 2022-07-06

## 2022-07-06 RX ORDER — EZETIMIBE 10 MG/1
10 TABLET ORAL DAILY
Qty: 90 TABLET | Refills: 1 | Status: SHIPPED | OUTPATIENT
Start: 2022-07-06 | End: 2023-02-10

## 2022-07-06 NOTE — PROGRESS NOTES
"Subjective   Nelsy Stokes is a 67 y.o. female.     History of Present Illness    Since the last visit, she has overall felt fairly well.  She has Primary Hypertension and well controlled on current medication, Impaired fasting glucose and will monitor labs to watch for DMII, Vitamin D deficiency and labs are at goal >30 ng/mL and Mixed hyperlipidemia LDL goal not met of less than 70 raise dose Livalo and on Zetia. .  she has been compliant with current medications have reviewed them.  The patient denies medication side effects.  Will refill medications. /78 (BP Location: Left arm, Patient Position: Sitting, Cuff Size: Adult)   Pulse 98   Temp 97.5 °F (36.4 °C)   Resp 16   Ht 172.7 cm (67.99\")   Wt 77.1 kg (170 lb)   LMP  (LMP Unknown)   SpO2 98%   BMI 25.85 kg/m²   Patient on Livalo due to intolerance to multiple tried statins.  Results for orders placed or performed in visit on 06/01/22   Comprehensive metabolic panel    Specimen: Blood   Result Value Ref Range    Glucose 97 65 - 99 mg/dL    BUN 12 8 - 27 mg/dL    Creatinine 0.98 0.57 - 1.00 mg/dL    EGFR Result 63 >59 mL/min/1.73    BUN/Creatinine Ratio 12 12 - 28    Sodium 142 134 - 144 mmol/L    Potassium 4.9 3.5 - 5.2 mmol/L    Chloride 103 96 - 106 mmol/L    Total CO2 24 20 - 29 mmol/L    Calcium 9.5 8.7 - 10.3 mg/dL    Total Protein 6.7 6.0 - 8.5 g/dL    Albumin 4.4 3.8 - 4.8 g/dL    Globulin 2.3 1.5 - 4.5 g/dL    A/G Ratio 1.9 1.2 - 2.2    Total Bilirubin 0.6 0.0 - 1.2 mg/dL    Alkaline Phosphatase 81 44 - 121 IU/L    AST (SGOT) 18 0 - 40 IU/L    ALT (SGPT) 19 0 - 32 IU/L   Lipid panel    Specimen: Blood   Result Value Ref Range    Total Cholesterol 202 (H) 100 - 199 mg/dL    Triglycerides 97 0 - 149 mg/dL    HDL Cholesterol 48 >39 mg/dL    VLDL Cholesterol Kale 18 5 - 40 mg/dL    LDL Chol Calc (NIH) 136 (H) 0 - 99 mg/dL   Vitamin B12    Specimen: Blood   Result Value Ref Range    Vitamin B-12 941 232 - 1,245 pg/mL   Folate    Specimen: " Blood   Result Value Ref Range    Folate 18.0 >3.0 ng/mL   Vitamin D 25 Hydroxy    Specimen: Blood   Result Value Ref Range    25 Hydroxy, Vitamin D 32.5 30.0 - 100.0 ng/mL   Hemoglobin A1c    Specimen: Blood   Result Value Ref Range    Hemoglobin A1C 6.2 (H) 4.8 - 5.6 %   Magnesium    Specimen: Blood   Result Value Ref Range    Magnesium 2.2 1.6 - 2.3 mg/dL   CBC & Differential    Specimen: Blood   Result Value Ref Range    WBC 6.6 3.4 - 10.8 x10E3/uL    RBC 5.26 3.77 - 5.28 x10E6/uL    Hemoglobin 16.4 (H) 11.1 - 15.9 g/dL    Hematocrit 49.5 (H) 34.0 - 46.6 %    MCV 94 79 - 97 fL    MCH 31.2 26.6 - 33.0 pg    MCHC 33.1 31.5 - 35.7 g/dL    RDW 12.8 11.7 - 15.4 %    Platelets 310 150 - 450 x10E3/uL    Neutrophil Rel % 45 Not Estab. %    Lymphocyte Rel % 44 Not Estab. %    Monocyte Rel % 7 Not Estab. %    Eosinophil Rel % 3 Not Estab. %    Basophil Rel % 1 Not Estab. %    Neutrophils Absolute 3.0 1.4 - 7.0 x10E3/uL    Lymphocytes Absolute 3.0 0.7 - 3.1 x10E3/uL    Monocytes Absolute 0.5 0.1 - 0.9 x10E3/uL    Eosinophils Absolute 0.2 0.0 - 0.4 x10E3/uL    Basophils Absolute 0.1 0.0 - 0.2 x10E3/uL    Immature Granulocyte Rel % 0 Not Estab. %    Immature Grans Absolute 0.0 0.0 - 0.1 x10E3/uL     Patient had follow-up with vascular surgeon Dr. Leong on 3/31/2022 did discuss her normal lower extremity arterial pressures right ankle but positive claudication and very mild diminished on the left and positive claudication.  Noting that she likely had blockage at aortic level but not causing her enough signs and symptoms to do invasive testing.  Patient also sees pain vascular for carotid artery stenosis and peripheral artery disease.  She had transcarotid artery revascularization December 2018.  Notes patient continues on aspirin and was on Plavix but due to extensive bruising medication was DC'd.  Patient last appointment was 1/19/2022.  Noted her visit to urology due to renal stone and to follow-up as needed.  Did see  cardiologist Dr. Bueno 10/8/2021 for aortic atherosclerosis, bilateral carotid artery stenosis, PAD, subclavian arterial stenosis and to follow-up yearly---  Saw DR Bosch, pulmonologist for COPD follow-up on 2/16/2022 continued trilogy and note lung CT screening was ordered by me.  Continue inhaler, stable COPD follow-up 12 months  She saw vascular for the pain in feet----hurt at night for months. Not vascular--need work up ; will get lumbar x-rays to exclude radiculitis causing symptoms and will get labs for neuropathy            hThe following portions of the patient's history were reviewed and updated as appropriate: allergies, current medications, past family history, past medical history, past social history, past surgical history and problem list.    Review of Systems   Constitutional: Negative for activity change, appetite change and unexpected weight change.   HENT: Negative for nosebleeds and trouble swallowing.    Eyes: Negative for pain and visual disturbance.   Respiratory: Negative for chest tightness, shortness of breath and wheezing.    Cardiovascular: Negative for chest pain and palpitations.   Gastrointestinal: Negative for abdominal pain and blood in stool.   Endocrine: Negative.    Genitourinary: Negative for difficulty urinating and hematuria.   Musculoskeletal: Negative for joint swelling.   Skin: Negative for color change and rash.   Allergic/Immunologic: Negative.    Neurological: Negative for syncope, speech difficulty and weakness.   Hematological: Negative for adenopathy.   Psychiatric/Behavioral: Negative for agitation and confusion.   All other systems reviewed and are negative.      Objective   Physical Exam  Vitals and nursing note reviewed.   Constitutional:       General: She is not in acute distress.     Appearance: She is well-developed. She is not ill-appearing, toxic-appearing or diaphoretic.   HENT:      Head: Normocephalic and atraumatic.      Right Ear: External ear normal.       Left Ear: External ear normal.      Nose: Nose normal.      Mouth/Throat:      Pharynx: Oropharynx is clear.   Eyes:      General: No scleral icterus.     Conjunctiva/sclera: Conjunctivae normal.      Pupils: Pupils are equal, round, and reactive to light.   Neck:      Thyroid: No thyromegaly.      Vascular: No carotid bruit.   Cardiovascular:      Rate and Rhythm: Normal rate and regular rhythm.      Pulses: Normal pulses.      Heart sounds: Normal heart sounds. No murmur heard.  Pulmonary:      Effort: Pulmonary effort is normal. No respiratory distress.      Breath sounds: Normal breath sounds.   Musculoskeletal:         General: No deformity. Normal range of motion.      Cervical back: Normal range of motion and neck supple.      Right lower leg: No edema.      Left lower leg: No edema.      Comments: Decreased pedal pulses 1+=   Skin:     General: Skin is warm and dry.      Coloration: Skin is not jaundiced.      Findings: No rash.   Neurological:      General: No focal deficit present.      Mental Status: She is alert and oriented to person, place, and time. Mental status is at baseline.      Coordination: Coordination normal.   Psychiatric:         Mood and Affect: Mood normal.         Behavior: Behavior normal.         Thought Content: Thought content normal.         Judgment: Judgment normal.         Assessment & Plan   Diagnoses and all orders for this visit:    1. Essential hypertension (Primary)  -     Comprehensive metabolic panel; Future  -     Lipid panel; Future  -     Hemoglobin A1c; Future    2. Mixed hyperlipidemia  -     Comprehensive metabolic panel; Future  -     Lipid panel; Future  -     Hemoglobin A1c; Future    3. Bilateral carotid artery stenosis  Comments:  sees vascular  Orders:  -     Comprehensive metabolic panel; Future  -     Lipid panel; Future  -     Hemoglobin A1c; Future    4. Tobacco abuse  -     Comprehensive metabolic panel; Future  -     Lipid panel; Future  -      Hemoglobin A1c; Future    5. Chronic obstructive pulmonary disease, unspecified COPD type (HCC)  Comments:  sees Dr Bosch  Orders:  -     Comprehensive metabolic panel; Future  -     Lipid panel; Future  -     Hemoglobin A1c; Future    6. Neuropathy of both feet  -     XR Spine Lumbar 2 or 3 View; Future  -     Heavy Metals, Blood  -     Copper, Serum  -     RPR  -     Sedimentation Rate  -     RIP + PE  -     Cryoglobulin  -     Comprehensive metabolic panel; Future  -     Lipid panel; Future  -     Hemoglobin A1c; Future    7. Impaired fasting glucose  -     Comprehensive metabolic panel; Future  -     Lipid panel; Future  -     Hemoglobin A1c; Future    Other orders  -     Pitavastatin Calcium 4 MG tablet; Take 1 tablet by mouth Every Night. For cholesterol  Dispense: 90 tablet; Refill: 3  -     ezetimibe (ZETIA) 10 MG tablet; Take 1 tablet by mouth Daily. For cholesterol  Dispense: 90 tablet; Refill: 1  -     folic acid (FOLVITE) 1 MG tablet; Take 1 tablet by mouth Daily.  Dispense: 90 tablet; Refill: 1  -     valsartan (DIOVAN) 320 MG tablet; Take 1 tablet by mouth Daily. for HTN  Dispense: 90 tablet; Refill: 1  -     metFORMIN ER (GLUCOPHAGE-XR) 500 MG 24 hr tablet; 2 PO daily with food for impaired fasting glucose  Dispense: 180 tablet; Refill: 3        Evaluation for neuropathy of feet with negative vascular cause and will get labs for work-up and lumbar x-ray to make sure no correlation from lumbar spine with pain bottom of feet can start taking gabapentin--wants to wait for now  Plan, Nelsy Stokes, was seen today.  she was seen for HTN and continue medication, Imparied fasting glucose and plan follow up labs, diet, and exercise, Hyperlipidemia with new medication plan and Vitamin D deficiency and supplemented.  Will start Metformin XR for impaired fasting glucose  Cont B12 and folate--working

## 2022-07-11 LAB
ALBUMIN SERPL ELPH-MCNC: 3.9 G/DL (ref 2.9–4.4)
ALBUMIN/GLOB SERPL: 1.4 {RATIO} (ref 0.7–1.7)
ALPHA1 GLOB SERPL ELPH-MCNC: 0.3 G/DL (ref 0–0.4)
ALPHA2 GLOB SERPL ELPH-MCNC: 1 G/DL (ref 0.4–1)
ARSENIC BLD-MCNC: <1 UG/L (ref 0–9)
B-GLOBULIN SERPL ELPH-MCNC: 1.1 G/DL (ref 0.7–1.3)
COPPER SERPL-MCNC: 135 UG/DL (ref 80–158)
CRYOGLOB SER QL 1D COLD INC: NORMAL
ERYTHROCYTE [SEDIMENTATION RATE] IN BLOOD BY WESTERGREN METHOD: 2 MM/HR (ref 0–40)
GAMMA GLOB SERPL ELPH-MCNC: 0.6 G/DL (ref 0.4–1.8)
GLOBULIN SER-MCNC: 3 G/DL (ref 2.2–3.9)
IGA SERPL-MCNC: 148 MG/DL (ref 87–352)
IGG SERPL-MCNC: 601 MG/DL (ref 586–1602)
IGM SERPL-MCNC: 30 MG/DL (ref 26–217)
INTERPRETATION SERPL IEP-IMP: NORMAL
LABORATORY COMMENT REPORT: NORMAL
LEAD BLDV-MCNC: 1 UG/DL (ref 0–4)
M PROTEIN SERPL ELPH-MCNC: NORMAL G/DL
MERCURY BLD-MCNC: <1 UG/L (ref 0–14.9)
PROT SERPL-MCNC: 6.9 G/DL (ref 6–8.5)
RPR SER QL: NON REACTIVE

## 2022-07-19 ENCOUNTER — TELEPHONE (OUTPATIENT)
Dept: FAMILY MEDICINE CLINIC | Facility: CLINIC | Age: 67
End: 2022-07-19

## 2022-07-19 NOTE — TELEPHONE ENCOUNTER
Caller: Nelsy Stokes    Relationship: Self    Best call back number:[phone]    What medication are you requesting: SWELLING RELIEF    What are your current symptoms: SEVERE FEET SWELLING    If a prescription is needed, what is your preferred pharmacy and phone number: [facility] [address] - [place], [place] - [address] [facility] - [phone]  - [phone]     Additional notes: PATIENT IS CURRENTLY IN [place], [place]. HER FEET ARE SWELLING EXTREMELY BAD. SHE IS REQUESTING SOME TYPE OF MEDICINE TO REDUCE THE SWELLING SO SHE CAN RETURN HOME.

## 2022-07-20 ENCOUNTER — TELEPHONE (OUTPATIENT)
Dept: FAMILY MEDICINE CLINIC | Facility: CLINIC | Age: 67
End: 2022-07-20

## 2022-07-20 RX ORDER — FUROSEMIDE 20 MG/1
20 TABLET ORAL DAILY
Qty: 3 TABLET | Refills: 1 | Status: SHIPPED | OUTPATIENT
Start: 2022-07-20 | End: 2022-10-14 | Stop reason: ALTCHOICE

## 2022-07-20 NOTE — TELEPHONE ENCOUNTER
----- Message from Nelsy Stokes sent at 7/20/2022  6:01 PM EDT -----  Regarding: Swollen Feet  Hi Gretchen. I just got home today from [place] and my feet are super swollen. i put in a call to you yesterday but was told you could not prescribe anything out of [place]. i have propped, iced, and soaked in Epsom salt Any suggestions would greatly be appreciated. they do go down some but when I am up on them they swell back up. Thank you

## 2022-10-11 ENCOUNTER — OFFICE VISIT (OUTPATIENT)
Dept: FAMILY MEDICINE CLINIC | Facility: CLINIC | Age: 67
End: 2022-10-11

## 2022-10-11 VITALS
RESPIRATION RATE: 16 BRPM | WEIGHT: 170 LBS | HEIGHT: 68 IN | SYSTOLIC BLOOD PRESSURE: 107 MMHG | BODY MASS INDEX: 25.76 KG/M2 | TEMPERATURE: 97.5 F | OXYGEN SATURATION: 95 % | DIASTOLIC BLOOD PRESSURE: 65 MMHG | HEART RATE: 97 BPM

## 2022-10-11 DIAGNOSIS — J06.9 ACUTE URI: Primary | ICD-10-CM

## 2022-10-11 DIAGNOSIS — I10 ESSENTIAL HYPERTENSION: ICD-10-CM

## 2022-10-11 DIAGNOSIS — R05.1 ACUTE COUGH: ICD-10-CM

## 2022-10-11 DIAGNOSIS — T14.8XXA SKIN ABRASION: ICD-10-CM

## 2022-10-11 PROCEDURE — 99213 OFFICE O/P EST LOW 20 MIN: CPT | Performed by: NURSE PRACTITIONER

## 2022-10-11 RX ORDER — BENZONATATE 200 MG/1
200 CAPSULE ORAL 3 TIMES DAILY PRN
Qty: 30 CAPSULE | Refills: 0 | Status: SHIPPED | OUTPATIENT
Start: 2022-10-11 | End: 2023-01-06 | Stop reason: ALTCHOICE

## 2022-10-11 RX ORDER — DOXYCYCLINE HYCLATE 100 MG/1
100 CAPSULE ORAL 2 TIMES DAILY
Qty: 20 CAPSULE | Refills: 0 | Status: SHIPPED | OUTPATIENT
Start: 2022-10-11 | End: 2022-10-27 | Stop reason: SDDI

## 2022-10-11 RX ORDER — PREDNISONE 20 MG/1
TABLET ORAL
Qty: 10 TABLET | Refills: 0 | Status: SHIPPED | OUTPATIENT
Start: 2022-10-11 | End: 2022-10-27 | Stop reason: SDDI

## 2022-10-11 NOTE — PROGRESS NOTES
"Chief Complaint  Cough (Symptoms for about 3 weeks.) and Nasal Congestion    Subjective          WILIAM presents to [facility]  As a 67 year old female c/o a 3 week history of non productive cough, congestion , fatigue.  She denies any sob, does have some wheezing on exertion at times.  No fever, no abd pain, no N/V/D.  She is able to eat and keep down fluids.  She is taking otc mucinex with only slight improvements.  She declined covid and influenza testing.    No other c/o today    The following portions of the patient's history were reviewed and updated as appropriate: allergies, current medications, past family history, past medical history, past social history, past surgical history, and problem list    Review of Systems   Constitutional: Positive for fatigue. Negative for chills and fever.   HENT: Positive for congestion and postnasal drip.    Eyes: Negative for visual disturbance.   Respiratory: Positive for cough and wheezing. Negative for shortness of breath.    Cardiovascular: Negative for chest pain, palpitations and leg swelling.   Gastrointestinal: Negative for abdominal pain, diarrhea, nausea and vomiting.   Skin: Negative for rash.   Neurological: Negative for dizziness and light-headedness.        Objective   Vital Signs:   Vitals:    10/11/22 1355   BP: 107/65   Pulse: 97   Resp: 16   Temp: 97.5 °F (36.4 °C)   TempSrc: Skin   SpO2: 95%   Weight: 77.1 kg (170 lb)   Height: 172.7 cm (67.99\")        BMI is >= 25 and <30. (Overweight) The following options were offered after discussion;: weight loss educational material (shared in after visit summary)        Physical Exam  Vitals reviewed.   Constitutional:       General: She is not in acute distress.  HENT:      Right Ear: Tympanic membrane, ear canal and external ear normal. There is no impacted cerumen.      Left Ear: Tympanic membrane, ear canal and external ear normal. There is no impacted cerumen.      Nose: Congestion " present.      Right Turbinates: Swollen.      Left Turbinates: Swollen.      Right Sinus: No maxillary sinus tenderness or frontal sinus tenderness.      Left Sinus: No maxillary sinus tenderness or frontal sinus tenderness.      Mouth/Throat:      Mouth: Mucous membranes are moist.      Pharynx: Oropharynx is clear. No oropharyngeal exudate or posterior oropharyngeal erythema.   Eyes:      Conjunctiva/sclera: Conjunctivae normal.   Neck:      Thyroid: No thyromegaly.      Vascular: No carotid bruit.   Cardiovascular:      Rate and Rhythm: Normal rate and regular rhythm.      Heart sounds: Normal heart sounds.   Pulmonary:      Effort: Pulmonary effort is normal. No respiratory distress.      Breath sounds: Normal breath sounds. No stridor. No wheezing, rhonchi or rales.   Chest:      Chest wall: No tenderness.   Neurological:      Mental Status: She is alert.   Psychiatric:         Attention and Perception: Attention normal.         Mood and Affect: Mood normal.          Result Review :     The following data was reviewed by: BENITO Solano on 10/11/2022:           Assessment and Plan    Diagnoses and all orders for this visit:    1. Acute URI (Primary)  -     predniSONE (DELTASONE) 20 MG tablet; One PO BID with food for 5 days  Dispense: 10 tablet; Refill: 0    2. Acute cough  Comments:  follow up with any worsening symptoms or no improvements  Orders:  -     doxycycline (VIBRAMYCIN) 100 MG capsule; Take 1 capsule by mouth 2 (Two) Times a Day. For infection  Dispense: 20 capsule; Refill: 0  -     predniSONE (DELTASONE) 20 MG tablet; One PO BID with food for 5 days  Dispense: 10 tablet; Refill: 0  -     benzonatate (TESSALON) 200 MG capsule; Take 1 capsule by mouth 3 (Three) Times a Day As Needed for Cough.  Dispense: 30 capsule; Refill: 0    3. Skin abrasion  Comments:  follow up if no improvments or any worsening symptoms  Orders:  -     doxycycline (VIBRAMYCIN) 100 MG capsule; Take 1 capsule by mouth 2  (Two) Times a Day. For infection  Dispense: 20 capsule; Refill: 0  -     mupirocin (BACTROBAN) 2 % ointment; Apply 1 application topically to the appropriate area as directed 3 (Three) Times a Day for 7 days.  Dispense: 30 g; Refill: 0    4. Essential hypertension  Comments:  monitor b/p closely while taking steroid        Plan:  -Take all medications as prescribed and until completed.  -Monitor for fever and take Tylenol as needed.  Drink plenty of fluids and get plenty of rest.  -Use cool-mist humidifier as needed.  -Seek immediate medical attention for fever unrelieved by Tylenol, chest pain, shortness of breath, sharp back pain, or any other worsening signs or symptoms.  -The patient verbalized understanding of all instructions given today.       Follow Up   Return if symptoms worsen or fail to improve.  Patient was given instructions and counseling regarding her condition or for health maintenance advice. Please see specific information pulled into the AVS if appropriate.

## 2022-10-14 ENCOUNTER — OFFICE VISIT (OUTPATIENT)
Dept: CARDIOLOGY | Facility: CLINIC | Age: 67
End: 2022-10-14

## 2022-10-14 VITALS
WEIGHT: 171.8 LBS | HEART RATE: 71 BPM | DIASTOLIC BLOOD PRESSURE: 80 MMHG | HEIGHT: 68 IN | SYSTOLIC BLOOD PRESSURE: 140 MMHG | BODY MASS INDEX: 26.04 KG/M2

## 2022-10-14 DIAGNOSIS — I10 ESSENTIAL HYPERTENSION: ICD-10-CM

## 2022-10-14 DIAGNOSIS — R07.2 PRECORDIAL PAIN: ICD-10-CM

## 2022-10-14 DIAGNOSIS — E78.2 MIXED HYPERLIPIDEMIA: ICD-10-CM

## 2022-10-14 DIAGNOSIS — I49.1 APC (ATRIAL PREMATURE CONTRACTIONS): Primary | ICD-10-CM

## 2022-10-14 DIAGNOSIS — I70.0 AORTIC ATHEROSCLEROSIS: Chronic | ICD-10-CM

## 2022-10-14 DIAGNOSIS — I73.9 PAD (PERIPHERAL ARTERY DISEASE): Chronic | ICD-10-CM

## 2022-10-14 DIAGNOSIS — Z72.0 TOBACCO ABUSE: Chronic | ICD-10-CM

## 2022-10-14 PROBLEM — R11.0 NAUSEA: Status: RESOLVED | Noted: 2020-09-02 | Resolved: 2022-10-14

## 2022-10-14 PROCEDURE — 93000 ELECTROCARDIOGRAM COMPLETE: CPT | Performed by: NURSE PRACTITIONER

## 2022-10-14 PROCEDURE — 99214 OFFICE O/P EST MOD 30 MIN: CPT | Performed by: NURSE PRACTITIONER

## 2022-10-14 NOTE — PROGRESS NOTES
"  Date of Office Visit: 10/14/2022  Encounter Provider: BENITO Douglas  Place of Service: [facility] CARDIOLOGY  Patient Name: Nelsy Stokes  :1955  Primary Cardiologist: Dr. Julito Bueno    Chief Complaint   Patient presents with   • Palpitations   • Chest Pain   • Shortness of Breath   • Hypertension   :     HPI: Nelsy Stokes is a pleasant 67 y.o. female who presents today for annual follow-up on aorta atherosclerosis and hypertension.  I have reviewed her medical records.    She has been diagnosed with peripheral arterial disease (carotid artery stenosis, abdominal atherosclerotic disease, and left subclavian artery stenosis), hyperlipidemia, and is a current cigarette smoker.     In 2018, she was having chest pain and had a normal treadmill stress test.    In 2020, she was experiencing chest pain and shortness of breath.  Nuclear stress test showed a very small amount of mild ischemia in the distal inferior wall and felt to be a low risk and borderline stress study.  Echocardiogram showed EF 62%, aortic valve sclerosis without stenosis, trace MR, and trace to mild TR.    She presents today for follow-up visit.  On today's EKG she was noted to have frequent atrial premature contractions (APCs).  She reports recent palpitations and sometimes notices her heart rate in the 40s-50s.  She says her blood pressures have been inconsistent and this morning it was 107/80.  She checks her blood pressure in the right arm because of the left subclavian artery stenosis.  She has been experiencing chest pain over the past month that \"anytime\".  She describes as a soreness to her chest and sometimes it is tender to touch.  She wonders if this could be related to bronchitis that she recently had.  She reports shortness of breath.  She has been experiencing redness/purple discoloration of her feet and pedal edema.    Past Medical History:   Diagnosis Date   • Acid " reflux    • Anxiety    • Aortic atherosclerosis (HCC) 10/23/2018   • APC (atrial premature contractions) 10/14/2022   • Asthma    • Carotid artery disease (HCC)    • Carotid artery stenosis     left   • COPD (chronic obstructive pulmonary disease) (HCC)    • Coronary artery disease    • Gallbladder disease    • GERD (gastroesophageal reflux disease)    • History of bradycardia    • History of chest pain     ECHO AND STRESS TEST IN EPIC FROM 2/2020   • History of colon polyps    • Hyperlipidemia    • Hypertension    • Nausea    • PONV (postoperative nausea and vomiting)    • PVD (peripheral vascular disease) (HCC)    • Subclavian artery stenosis (HCC)    • Tobacco abuse    • Umbilical hernia        Past Surgical History:   Procedure Laterality Date   • CAROTID STENT Right 2018   • CHEST WALL BIOPSY  09/2022   • CHOLECYSTECTOMY WITH INTRAOPERATIVE CHOLANGIOGRAM N/A 11/18/2020    Procedure: LAPAROSCOPIC CHOLECYSTECTOMY WITH INTRAOPERATIVE CHOLANGIOGRAM, OPEN UMBILICAL HERNIA REPAIR;  Surgeon: Maria D Ariza MD;  Location: [facility] MAIN OR;  Service: General;  Laterality: N/A;   • COLONOSCOPY N/A 2017   • ENDOSCOPY N/A 10/09/2020    Procedure: ESOPHAGOGASTRODUODENOSCOPY WITH COLD BIOPSIES;  Surgeon: Jacque Huber MD;  Location: [facility] ENDOSCOPY;  Service: Gastroenterology;  Laterality: N/A;  PRE: REFLUX  POST: ESOPHAGITIS, GASTRITIS, DUODENITIS   • EXTRACORPOREAL SHOCK WAVE LITHOTRIPSY (ESWL)  09/09/2020    S[facility]ERY [facility] IN [place], [place]   • KNEE CARTILAGE SURGERY Right 1990s       Social History     Socioeconomic History   • Marital status:    Tobacco Use   • Smoking status: Every Day     Packs/day: 1.00     Types: Cigarettes   • Smokeless tobacco: Never   • Tobacco comments:     caff use: 3 cups daily.  Began smoking at age 15.  Smoked .75 ppd for 7 years and otherwise 1 ppd for a 48.25 pack year history.   Vaping Use   • Vaping Use: Never used   Substance and Sexual Activity   • Alcohol use: Not  Currently     Comment: SOCIALLY   • Drug use: No   • Sexual activity: Yes     Partners: Male     Comment:        Family History   Problem Relation Age of Onset   • Hypertension Father    • Lung disease Father    • Heart attack Father 62   • Anesthesia problems Father    • Heart failure Father 88   • Aneurysm Father         Abdominal Aortic Aneurysm   • COPD Father    • Atrial fibrillation Father    • Gallbladder disease Father    • Lung cancer Father 75   • Prostate cancer Father    • Cancer Sister    • Gallbladder disease Sister    • Heart disease Mother 83   • Hypertension Mother    • Atrial fibrillation Mother    • Gallbladder disease Sister    • Malig Hyperthermia Neg Hx        The following portion of the patient's history were reviewed and updated as appropriate: past medical history, past surgical history, past social history, past family history, allergies, current medications, and problem list.    Review of Systems   Constitutional: Positive for weight gain.   Cardiovascular: Positive for claudication, dyspnea on exertion, leg swelling and palpitations.   Respiratory: Positive for cough and shortness of breath.    Hematologic/Lymphatic: Bruises/bleeds easily.   Neurological: Negative.        Allergies   Allergen Reactions   • Pitavastatin Myalgia     Other reaction(s): Myalgia         Current Outpatient Medications:   •  albuterol sulfate  (90 Base) MCG/ACT inhaler, Inhale 2 puffs Every 4 (Four) Hours As Needed for Wheezing., Disp: 1 inhaler, Rfl: 11  •  aspirin 81 MG EC tablet, Take 81 mg by mouth Daily. Instructed pt to follow md instructions regarding holding for surgery, Disp: , Rfl:   •  benzonatate (TESSALON) 200 MG capsule, Take 1 capsule by mouth 3 (Three) Times a Day As Needed for Cough., Disp: 30 capsule, Rfl: 0  •  Cholecalciferol 50 MCG (2000 UT) capsule, One PO daily, Disp: 90 each, Rfl: 3  •  Cyanocobalamin (B-12) 1000 MCG lozenge, Take 1 tablet by mouth Every Other Day., Disp:  "90 each, Rfl: 3  •  doxycycline (VIBRAMYCIN) 100 MG capsule, Take 1 capsule by mouth 2 (Two) Times a Day. For infection, Disp: 20 capsule, Rfl: 0  •  ezetimibe (ZETIA) 10 MG tablet, Take 1 tablet by mouth Daily. For cholesterol, Disp: 90 tablet, Rfl: 1  •  Fluticasone-Umeclidin-Vilant (Trelegy Ellipta) 100-62.5-25 MCG/INH inhaler, Inhale 1 puff Daily., Disp: 60 each, Rfl: 5  •  folic acid (FOLVITE) 1 MG tablet, Take 1 tablet by mouth Daily., Disp: 90 tablet, Rfl: 1  •  metFORMIN ER (GLUCOPHAGE-XR) 500 MG 24 hr tablet, 2 PO daily with food for impaired fasting glucose, Disp: 180 tablet, Rfl: 3  •  mupirocin (BACTROBAN) 2 % ointment, Apply 1 application topically to the appropriate area as directed 3 (Three) Times a Day for 7 days., Disp: 30 g, Rfl: 0  •  nystatin (MYCOSTATIN) 100,000 unit/mL suspension, Swish and swallow 5 mL 4 (Four) Times a Day., Disp: 473 mL, Rfl: 5  •  pantoprazole (Protonix) 40 MG EC tablet, One PO daily for GERD, Disp: 90 tablet, Rfl: 1  •  Pitavastatin Calcium 4 MG tablet, Take 1 tablet by mouth Every Night. For cholesterol, Disp: 90 tablet, Rfl: 3  •  predniSONE (DELTASONE) 20 MG tablet, One PO BID with food for 5 days, Disp: 10 tablet, Rfl: 0  •  valsartan (DIOVAN) 320 MG tablet, Take 1 tablet by mouth Daily. for HTN, Disp: 90 tablet, Rfl: 1        Objective:     Vitals:    10/14/22 1426 10/14/22 1505   BP: 170/80 140/80   BP Location: Right arm Right arm   Patient Position: Sitting Sitting   Cuff Size: Adult    Pulse: 71    Weight: 77.9 kg (171 lb 12.8 oz)    Height: 172.7 cm (67.99\")      Body mass index is 26.13 kg/m².    PHYSICAL EXAM:    Vitals Reviewed.   General Appearance: No acute distress, well developed and well nourished.    Eyes: Conjunctiva and lids: No erythema, swelling, or discharge. Sclera non-icteric.    HENT: Atraumatic, normocephalic. External eyes, ears, and nose normal. No hearing loss noted. Mucous membranes normal. Lips not cyanotic. Neck supple with no tenderness. " Wearing mask.   Respiratory: No signs of respiratory distress. Respiration rhythm and depth normal.   Clear to auscultation. No rales, crackles, rhonchi, or wheezing auscultated.   Cardiovascular:  Jugular Venous Pressure: Normal  Heart Rate and Rhythm: Ectopy noted.  Heart Sounds: Normal S1 and S2. No S3 or S4 noted.  Murmurs: No murmurs noted. No rubs, thrills, or gallops.    Lower Extremities: Pedal edema noted.  Red/purple discoloration of feet.  Gastrointestinal:  Abdomen soft, non-distended, non-tender.    Musculoskeletal: Normal movement of extremities.  Skin and Nails: General appearance normal. No pallor, cyanosis, diaphoresis. Skin temperature normal. No clubbing of fingernails.   Psychiatric: Patient alert and oriented to person, place, and time. Speech and behavior appropriate. Normal mood and affect.       ECG 12 Lead    Date/Time: 10/14/2022 2:32 PM  Performed by: Diane Bryant APRN  Authorized by: Diane Bryant APRN   Comparison: compared with previous ECG from 10/8/2021  Similar to previous ECG  Rhythm: sinus rhythm  Ectopy: atrial premature contractions and bigeminy  Rate: normal  BPM: 71  Conduction: conduction normal  ST Segments: ST segments normal  T Waves: T waves normal  QRS axis: normal    Clinical impression: abnormal EKG              Assessment:       Diagnosis Plan   1. APC (atrial premature contractions)  Holter Monitor - 24 Hour      2. Precordial pain        3. Aortic atherosclerosis (HCC)        4. Essential hypertension        5. Mixed hyperlipidemia        6. PAD (peripheral artery disease) ([facility])        7. Tobacco abuse               Plan:       1.  APCs: She has been experiencing palpitations and has noticed her heart rate low in the 40-50s at home.  Today's EKG shows sinus rhythm, supraventricular bigeminy, and a heart rate of 71.  I think this is most likely due to to her pulmonary status.  I recommended a 24-hour Holter monitor.    2.  Chest Pain: Describes nonspecific  "chest pain that can happen \"anytime\".  She had a borderline abnormal stress test in the past.  I offered another stress test and she wants to think about it.  She certainly is at high risk for CAD with her other comorbidities.    3.  Aortic Atherosclerosis.  Continue aspirin and statin.    4.  Hypertension: Blood pressure elevated today.  Low at home this morning.  Continue to monitor.    5.  Hyperlipidemia: On ezetimibe and pitavastatin.    6.  Peripheral Arterial Disease: Known subclavian stenosis, carotid stenosis, PAD of lower extremities.  She is experiencing bilateral pedal edema and redness/purple discoloration of her feet.  ABIs were abnormal in March of this year.  I recommend follow-up with Dr. Zac Leong.  Possibly compression stockings may be beneficial for her, but she says she does not like them.  Low-sodium diet.  I will defer diuretic therapy to her PCP.  I do not feel that this is heart failure.    7.  She would highly benefit from quitting cigarette smoking.    8.  Further recommendations to follow after the 24-hour Holter monitor.  Hopefully she will change her mind about stress testing.    As always, it has been a pleasure to participate in your patient's care. Thank you.       Sincerely,         BENITO Salguero  [facility] Cardiology      · Dictated utilizing Dragon Dictation  · COVID-19 Precautions - Patient was compliant in wearing a mask. When I saw the patient, I used appropriate personal protective equipment (PPE) including mask and eye shield (standard procedure).  Additionally, I used gown and gloves if indicated.  Hand hygiene was completed before and after seeing the patient.  · I spent 33 minutes reviewing her medical records/testing/previous office notes/labs, face-to-face interaction with patient, physical examination, formulating the plan of care, and discussion of plan of care with patient.     "

## 2022-10-21 ENCOUNTER — TELEPHONE (OUTPATIENT)
Dept: CARDIOLOGY | Facility: CLINIC | Age: 67
End: 2022-10-21

## 2022-10-21 DIAGNOSIS — I47.1 PSVT (PAROXYSMAL SUPRAVENTRICULAR TACHYCARDIA): ICD-10-CM

## 2022-10-21 DIAGNOSIS — R00.2 PALPITATIONS: ICD-10-CM

## 2022-10-21 DIAGNOSIS — I49.1 APC (ATRIAL PREMATURE CONTRACTIONS): Primary | ICD-10-CM

## 2022-10-21 DIAGNOSIS — R07.2 PRECORDIAL PAIN: ICD-10-CM

## 2022-10-21 RX ORDER — DILTIAZEM HYDROCHLORIDE 120 MG/1
120 CAPSULE, EXTENDED RELEASE ORAL DAILY
Qty: 90 CAPSULE | Refills: 0 | Status: SHIPPED | OUTPATIENT
Start: 2022-10-21 | End: 2023-01-13

## 2022-10-21 NOTE — TELEPHONE ENCOUNTER
Nelsy Stokes is returning your call.    Explained to patient Diane Bryant is in clinic and may return call between patients or at the end of the day.  Encouraged patient to keep phone by her and she stated she will do this.    Thank you,  Tana Jeronimo RN  Triage Nurse [facility]

## 2022-10-21 NOTE — TELEPHONE ENCOUNTER
Patient was recently seen for palpitations and felt like her heart rate was running 40-50s at home.  EKG showed NSR with supraventricular bigeminy.  I felt like the APCs were occurring because of her pulmonary status and she would benefit from quitting smoking.  She was noted to have frequent episodes of SVT lasting 11 beats in duration at approximate rates of 160.  Her average heart rate is at 79 and the minimum heart rate was 56.  Would not put her on a beta-blocker, but possibly a calcium channel blocker.  I left a message for patient to return my call to discuss.

## 2022-10-21 NOTE — TELEPHONE ENCOUNTER
I spoke with patient via telephone about test results and she verbalizes understanding.  She is experiencing palpitations.  Start diltiazem 120 mg daily.  She reports chest pain.  I have ordered an echocardiogram and walking stress test.    Office Scheduling-please arrange an echocardiogram and stress test to be completed.  Thank you.

## 2022-10-27 ENCOUNTER — OFFICE VISIT (OUTPATIENT)
Dept: FAMILY MEDICINE CLINIC | Facility: CLINIC | Age: 67
End: 2022-10-27

## 2022-10-27 VITALS
DIASTOLIC BLOOD PRESSURE: 80 MMHG | HEIGHT: 68 IN | TEMPERATURE: 97.5 F | HEART RATE: 75 BPM | RESPIRATION RATE: 16 BRPM | SYSTOLIC BLOOD PRESSURE: 136 MMHG | BODY MASS INDEX: 25.88 KG/M2 | WEIGHT: 170.8 LBS | OXYGEN SATURATION: 97 %

## 2022-10-27 DIAGNOSIS — I49.1 APC (ATRIAL PREMATURE CONTRACTIONS): ICD-10-CM

## 2022-10-27 DIAGNOSIS — I73.9 PAD (PERIPHERAL ARTERY DISEASE): Chronic | ICD-10-CM

## 2022-10-27 DIAGNOSIS — I71.40 ABDOMINAL AORTIC ANEURYSM (AAA) WITHOUT RUPTURE, UNSPECIFIED PART: ICD-10-CM

## 2022-10-27 DIAGNOSIS — I65.22 LEFT CAROTID ARTERY STENOSIS: ICD-10-CM

## 2022-10-27 DIAGNOSIS — E78.2 MIXED HYPERLIPIDEMIA: ICD-10-CM

## 2022-10-27 DIAGNOSIS — C44.92 SQUAMOUS CELL SKIN CANCER: ICD-10-CM

## 2022-10-27 DIAGNOSIS — Z72.0 TOBACCO ABUSE: Chronic | ICD-10-CM

## 2022-10-27 DIAGNOSIS — I65.23 BILATERAL CAROTID ARTERY STENOSIS: Chronic | ICD-10-CM

## 2022-10-27 DIAGNOSIS — I10 ESSENTIAL HYPERTENSION: Primary | ICD-10-CM

## 2022-10-27 PROCEDURE — 99214 OFFICE O/P EST MOD 30 MIN: CPT | Performed by: PHYSICIAN ASSISTANT

## 2022-10-27 RX ORDER — VALSARTAN 320 MG/1
320 TABLET ORAL DAILY
Qty: 90 TABLET | Refills: 1 | Status: SHIPPED | OUTPATIENT
Start: 2022-10-27

## 2022-10-27 NOTE — PROGRESS NOTES
"Subjective   Nelsy Stokes is a 67 y.o. female.     History of Present Illness    Since the last visit, she has overall felt fairly well.  She has Primary Hypertension and well controlled on current medication, Impaired fasting glucose and will monitor labs to watch for DMII, GERD controlled on PPI Rx, Vitamin D deficiency and will update labs for continued management and Mixed hyperlipidemia need to update labs and concerned that her last LDL was not less than 70.  With patient's multiple risk factors with PAD and carotid artery atherosclerosis with stenosis do want to consider injectable cholesterol Rx in addition to her current regimen to get LDL less than 70.  she has been compliant with current medications have reviewed them.  The patient denies medication side effects.  Will refill medications. /80 (BP Location: Left arm, Patient Position: Sitting, Cuff Size: Adult)   Pulse 75   Temp 97.5 °F (36.4 °C) (Oral)   Resp 16   Ht 172.7 cm (67.99\")   Wt 77.5 kg (170 lb 12.8 oz)   LMP  (LMP Unknown)   SpO2 97%   BMI 25.98 kg/m²   She is taking Diltiazem XR now----since Holter. Still on Valsartan    Results for orders placed or performed during the hospital encounter of 10/18/22   Holter Monitor - 24 Hour   Result Value Ref Range    Target HR (85%) 130 bpm    Max. Pred. HR (100%) 153 bpm   Last visit 7/6/2022 started her on metformin for treatment of impaired fasting glucose.  Also noted could consider gabapentin for the neuropathy type pain in her feet.    Patient sees many specialist and note last visit with cardiology was 10/14/2022 to follow-up on APCs and palpitations.  Noted her history of PAD with coronary artery stenosis, abdominal atherosclerotic disease, left subclavian artery stenosis, hyperlipidemia and current smoker..  Noting nuclear stress test February 2020 with mild ischemia.  Cardiologist practitioner ordered 24-hour Holter due to patient's concern with heart rate in the 40s to 50s at " home at times and noted EKG that visit day with sinus rhythm, supraventricular bigeminy, and heart rate of 71.  Was concerned her symptoms were related to pulmonary status.------abn Holter and to have stress echo.  Noting on the Holter monitor minimum heart rate was 56.  Saw Patsy----Doxy and pred---worked  Patient on Livalo due to intolerance to multiple tried statins.  Saw DR Bosch, pulmonologist for COPD follow-up on 2/16/2022 continued trilogy and note lung CT screening was ordered by me.  Continue inhaler, stable COPD follow-up 12 months    Need notes from Sept---vascular  My last notes from 3-31-22-----    She reports AAA 2.5  Had several dopplers---need reports  Patient had follow-up with vascular surgeon Dr. Leong on 3/31/2022 did discuss her normal lower extremity arterial pressures right ankle but positive claudication and very mild diminished on the left and positive claudication.  Noting that she likely had blockage at aortic level but not causing her enough signs and symptoms to do invasive testing.  Patient also sees pain vascular for carotid artery stenosis and peripheral artery disease.  She had transcarotid artery revascularization December 2018.  Notes patient continues on aspirin and was on Plavix but due to extensive bruising medication was DC'd.  She was noted to have frequent episodes of SVT lasting 11 beats in duration at approximate rates of 160.  Her average heart rate is at 79 and the minimum heart rate was 56.  Would not put her on a beta-blocker, but possibly a calcium channel blocker.  I left a message for patient to return my call to discuss.    Saw urologist Sept----need copies reports  Monitors her hematuria ---benign work up 2018  Sees derm---had skin cancer-----squamous cell---has f/u 6 mos    The following portions of the patient's history were reviewed and updated as appropriate: allergies, current medications, past family history, past medical history, past social history, past  surgical history and problem list.    Review of Systems   Constitutional: Negative for activity change, appetite change and unexpected weight change.   HENT: Negative for nosebleeds and trouble swallowing.    Eyes: Negative for pain and visual disturbance.   Respiratory: Negative for chest tightness, shortness of breath and wheezing.    Cardiovascular: Positive for palpitations. Negative for chest pain.   Gastrointestinal: Negative for abdominal pain and blood in stool.   Endocrine: Negative.    Genitourinary: Negative for difficulty urinating and hematuria.   Musculoskeletal: Negative for joint swelling.   Skin: Negative for color change and rash.   Allergic/Immunologic: Negative.    Neurological: Negative for syncope and speech difficulty.   Hematological: Negative for adenopathy.   Psychiatric/Behavioral: Negative for agitation and confusion.   All other systems reviewed and are negative.      Objective   Physical Exam  Vitals and nursing note reviewed.   Constitutional:       General: She is not in acute distress.     Appearance: She is well-developed. She is not ill-appearing or toxic-appearing.   HENT:      Head: Normocephalic.      Right Ear: External ear normal.      Left Ear: External ear normal.      Nose: Nose normal.      Mouth/Throat:      Pharynx: Oropharynx is clear.   Eyes:      General: No scleral icterus.     Conjunctiva/sclera: Conjunctivae normal.      Pupils: Pupils are equal, round, and reactive to light.   Neck:      Thyroid: No thyromegaly.   Cardiovascular:      Rate and Rhythm: Normal rate. Rhythm irregular.      Comments: Barely palp pedal pulses  Pulmonary:      Effort: Pulmonary effort is normal. No respiratory distress.      Breath sounds: Normal breath sounds.   Musculoskeletal:         General: No deformity. Normal range of motion.      Cervical back: Normal range of motion and neck supple.      Right lower leg: No edema.      Left lower leg: No edema.   Skin:     General: Skin is  warm and dry.      Findings: No rash.   Neurological:      General: No focal deficit present.      Mental Status: She is alert and oriented to person, place, and time. Mental status is at baseline.   Psychiatric:         Mood and Affect: Mood normal.         Behavior: Behavior normal.         Thought Content: Thought content normal.         Judgment: Judgment normal.         Assessment & Plan   Diagnoses and all orders for this visit:    1. Essential hypertension (Primary)    2. APC (atrial premature contractions)  Comments:  sees cardio    3. Left carotid artery stenosis  Comments:  sees vascular    4. Mixed hyperlipidemia    5. Bilateral carotid artery stenosis    6. Tobacco abuse    7. PAD (peripheral artery disease) (HCC)  Comments:  sees vascular    8. Abdominal aortic aneurysm (AAA) without rupture, unspecified part  Comments:  sees vascular    9. Squamous cell skin cancer  Comments:  sees derm Q 6 mos        She is having leg pain with walking-----if cont ---see vascular  Do want Prevnar 20  Consider pap  make sure to have an appointment with pulmonologist in February for follow-up on COPD  Patient is tolerating metformin for the impaired fasting glucose we will update her labs to check kidney functions and A1c and we will also update LDL due to concern not at goal last labs and note patient is on statin and Zetia  Has stress echo scheduled with cardiology and now on diltiazem due to Holter monitor showing episodes of SVT lasting 11 beats

## 2022-11-05 RX ORDER — ALBUTEROL SULFATE 90 UG/1
2 AEROSOL, METERED RESPIRATORY (INHALATION) EVERY 4 HOURS PRN
Qty: 24 G | Refills: 3 | Status: SHIPPED | OUTPATIENT
Start: 2022-11-05

## 2022-11-05 RX ORDER — METHYLPREDNISOLONE 4 MG/1
TABLET ORAL
Qty: 21 TABLET | Refills: 0 | Status: SHIPPED | OUTPATIENT
Start: 2022-11-05 | End: 2023-01-06 | Stop reason: ALTCHOICE

## 2022-11-08 ENCOUNTER — TELEPHONE (OUTPATIENT)
Dept: CARDIOLOGY | Facility: CLINIC | Age: 67
End: 2022-11-08

## 2022-11-08 NOTE — TELEPHONE ENCOUNTER
----- Message from BENITO Patino sent at 11/6/2022  4:46 PM EST -----  Regarding: FW: test scheduled for 11/7/21  Contact: [phone]    ----- Message -----  From: Nelsy Stokes  Sent: 11/5/2022   3:46 PM EST  To: [facility]  Subject: test scheduled for 11/7/21                       I tested positive for Covid on 11/4/21.Please contact me to reschedule at a later date.

## 2022-11-08 NOTE — TELEPHONE ENCOUNTER
Patient had to cancel her recent stress test and echocardiogram because of COVID-19 virus.  Please call to schedule in the future.  Thank you.

## 2022-11-09 ENCOUNTER — PATIENT MESSAGE (OUTPATIENT)
Dept: FAMILY MEDICINE CLINIC | Facility: CLINIC | Age: 67
End: 2022-11-09

## 2022-11-09 NOTE — TELEPHONE ENCOUNTER
I need to discuss with cardiology and collaborate plan.  We will ask cardiology if okay to increase diltiazem to 240 mg

## 2022-11-09 NOTE — TELEPHONE ENCOUNTER
Gretchen Sinha LPN 11/9/2022 4:36 PM EST      ----- Message -----  From: Nelsy Stokes  Sent: 11/9/2022 3:23 PM EST  To: [facility]  Subject: blood pressure     Right now my is /91 and my pulse is74. my BP has been running in the 180s/100s. It has not been normal in a few days now, even taking the Valsartan. Thank you for helping me.

## 2022-11-10 ENCOUNTER — TELEPHONE (OUTPATIENT)
Dept: CARDIOLOGY | Facility: CLINIC | Age: 67
End: 2022-11-10

## 2022-11-10 NOTE — TELEPHONE ENCOUNTER
Please call patient and triage this.  If her blood pressures are still elevated greater than 140/90, increase diltiazem to 240 mg daily.  She will then need a follow-up appointment in 3 to 4 weeks.  Thank you.

## 2022-11-10 NOTE — TELEPHONE ENCOUNTER
Regarding: blood pressure  Contact: [phone]  ----- Message from Gretchen King PA-C sent at 11/9/2022  5:52 PM EST -----       ----- Message from Nelsy Stokes to Gretchen King PA-C sent at 11/9/2022  3:23 PM -----   Right now my is /91 and my pulse is74. my BP has been running in the 180s/100s. It has not been normal in a few days now, even taking the Valsartan. Thank you for helping me.      ----- Message -----       From:Gretchen King PA-C       Sent:11/9/2022 12:48 PM EST         To:Nelsy Stokes    Subject:blood pressure    Need to know what your blood pressure readings are?  Also pulse rate----it is possible that I can increase still diltiazem but need to know more      ----- Message -----       From:Nelsy Stokes       Sent:11/9/2022 12:19 PM EST         To:Gretchen King PA-C    Subject:blood pressure    Gretchen my BP is luis high and I am not sure what is going on or what to do. Sorry to keep bothering you but this Covid issue has me all over the place and a little scared. Thank you once again

## 2022-11-11 NOTE — TELEPHONE ENCOUNTER
Attempted to call patient, no answer.  Left a voicemail for patient to call back.  Will continue to try to reach patient.    Vicki Gomez RN  [facility] Triage  11/11/22 09:22 EST

## 2022-11-15 NOTE — TELEPHONE ENCOUNTER
Called and spoke with patient. Went over Diane's recommendations. Scheduled f/u with Diane. Pt verbalized understanding.    Thank you,    Concepción Muhammad, RN  [place] [facility]  11/15/22 12:55 EST

## 2022-11-15 NOTE — TELEPHONE ENCOUNTER
Attempted to call patient, no answer.  Left a voicemail for patient to call back.  Will continue to try to reach patient.    Vicki Gomez RN  [facility] Triage  11/15/22 10:50 EST

## 2022-11-20 ENCOUNTER — APPOINTMENT (OUTPATIENT)
Dept: CT IMAGING | Facility: HOSPITAL | Age: 67
End: 2022-11-20

## 2022-11-20 ENCOUNTER — HOSPITAL ENCOUNTER (EMERGENCY)
Facility: HOSPITAL | Age: 67
Discharge: HOME | End: 2022-11-20
Attending: EMERGENCY MEDICINE | Admitting: EMERGENCY MEDICINE

## 2022-11-20 VITALS
OXYGEN SATURATION: 96 % | SYSTOLIC BLOOD PRESSURE: 160 MMHG | DIASTOLIC BLOOD PRESSURE: 83 MMHG | WEIGHT: 168 LBS | TEMPERATURE: 96.7 F | HEART RATE: 79 BPM | BODY MASS INDEX: 25.46 KG/M2 | HEIGHT: 68 IN | RESPIRATION RATE: 18 BRPM

## 2022-11-20 DIAGNOSIS — S39.012A STRAIN OF LUMBAR REGION, INITIAL ENCOUNTER: Primary | ICD-10-CM

## 2022-11-20 DIAGNOSIS — R10.30 LOWER ABDOMINAL PAIN: ICD-10-CM

## 2022-11-20 LAB
ALBUMIN SERPL-MCNC: 3.9 G/DL (ref 3.5–5.2)
ALBUMIN/GLOB SERPL: 1.5 G/DL
ALP SERPL-CCNC: 68 U/L (ref 39–117)
ALT SERPL W P-5'-P-CCNC: 14 U/L (ref 1–33)
ANION GAP SERPL CALCULATED.3IONS-SCNC: 9 MMOL/L (ref 5–15)
AST SERPL-CCNC: 8 U/L (ref 1–32)
BACTERIA UR QL AUTO: ABNORMAL /HPF
BASOPHILS # BLD AUTO: 0.08 10*3/MM3 (ref 0–0.2)
BASOPHILS NFR BLD AUTO: 1 % (ref 0–1.5)
BILIRUB SERPL-MCNC: 0.9 MG/DL (ref 0–1.2)
BILIRUB UR QL STRIP: NEGATIVE
BUN SERPL-MCNC: <2 MG/DL (ref 8–23)
BUN/CREAT SERPL: ABNORMAL
CALCIUM SPEC-SCNC: 9.1 MG/DL (ref 8.6–10.5)
CHLORIDE SERPL-SCNC: 102 MMOL/L (ref 98–107)
CLARITY UR: CLEAR
CO2 SERPL-SCNC: 24 MMOL/L (ref 22–29)
COLOR UR: YELLOW
CREAT SERPL-MCNC: 0.87 MG/DL (ref 0.57–1)
DEPRECATED RDW RBC AUTO: 43.9 FL (ref 37–54)
EGFRCR SERPLBLD CKD-EPI 2021: 73.1 ML/MIN/1.73
EOSINOPHIL # BLD AUTO: 0.19 10*3/MM3 (ref 0–0.4)
EOSINOPHIL NFR BLD AUTO: 2.4 % (ref 0.3–6.2)
ERYTHROCYTE [DISTWIDTH] IN BLOOD BY AUTOMATED COUNT: 12.6 % (ref 12.3–15.4)
GLOBULIN UR ELPH-MCNC: 2.6 GM/DL
GLUCOSE SERPL-MCNC: 94 MG/DL (ref 65–99)
GLUCOSE UR STRIP-MCNC: NEGATIVE MG/DL
HCT VFR BLD AUTO: 41.1 % (ref 34–46.6)
HGB BLD-MCNC: 13.5 G/DL (ref 12–15.9)
HGB UR QL STRIP.AUTO: ABNORMAL
HYALINE CASTS UR QL AUTO: ABNORMAL /LPF
IMM GRANULOCYTES # BLD AUTO: 0.02 10*3/MM3 (ref 0–0.05)
IMM GRANULOCYTES NFR BLD AUTO: 0.2 % (ref 0–0.5)
KETONES UR QL STRIP: ABNORMAL
LEUKOCYTE ESTERASE UR QL STRIP.AUTO: ABNORMAL
LIPASE SERPL-CCNC: 20 U/L (ref 13–60)
LYMPHOCYTES # BLD AUTO: 2.01 10*3/MM3 (ref 0.7–3.1)
LYMPHOCYTES NFR BLD AUTO: 24.9 % (ref 19.6–45.3)
MCH RBC QN AUTO: 31 PG (ref 26.6–33)
MCHC RBC AUTO-ENTMCNC: 32.8 G/DL (ref 31.5–35.7)
MCV RBC AUTO: 94.5 FL (ref 79–97)
MONOCYTES # BLD AUTO: 0.87 10*3/MM3 (ref 0.1–0.9)
MONOCYTES NFR BLD AUTO: 10.8 % (ref 5–12)
NEUTROPHILS NFR BLD AUTO: 4.9 10*3/MM3 (ref 1.7–7)
NEUTROPHILS NFR BLD AUTO: 60.7 % (ref 42.7–76)
NITRITE UR QL STRIP: NEGATIVE
NRBC BLD AUTO-RTO: 0 /100 WBC (ref 0–0.2)
PH UR STRIP.AUTO: 5.5 [PH] (ref 5–8)
PLATELET # BLD AUTO: 240 10*3/MM3 (ref 140–450)
PMV BLD AUTO: 9 FL (ref 6–12)
POTASSIUM SERPL-SCNC: 4.6 MMOL/L (ref 3.5–5.2)
PROT SERPL-MCNC: 6.5 G/DL (ref 6–8.5)
PROT UR QL STRIP: ABNORMAL
RBC # BLD AUTO: 4.35 10*6/MM3 (ref 3.77–5.28)
RBC # UR STRIP: ABNORMAL /HPF
REF LAB TEST METHOD: ABNORMAL
SODIUM SERPL-SCNC: 135 MMOL/L (ref 136–145)
SP GR UR STRIP: >=1.03 (ref 1–1.03)
SQUAMOUS #/AREA URNS HPF: ABNORMAL /HPF
UROBILINOGEN UR QL STRIP: ABNORMAL
WBC # UR STRIP: ABNORMAL /HPF
WBC NRBC COR # BLD: 8.07 10*3/MM3 (ref 3.4–10.8)

## 2022-11-20 PROCEDURE — 99284 EMERGENCY DEPT VISIT MOD MDM: CPT

## 2022-11-20 PROCEDURE — 25010000002 ONDANSETRON PER 1 MG: Performed by: EMERGENCY MEDICINE

## 2022-11-20 PROCEDURE — 25010000002 HYDROMORPHONE PER 4 MG: Performed by: EMERGENCY MEDICINE

## 2022-11-20 PROCEDURE — 83690 ASSAY OF LIPASE: CPT | Performed by: EMERGENCY MEDICINE

## 2022-11-20 PROCEDURE — 96374 THER/PROPH/DIAG INJ IV PUSH: CPT

## 2022-11-20 PROCEDURE — 85025 COMPLETE CBC W/AUTO DIFF WBC: CPT | Performed by: EMERGENCY MEDICINE

## 2022-11-20 PROCEDURE — 81001 URINALYSIS AUTO W/SCOPE: CPT | Performed by: EMERGENCY MEDICINE

## 2022-11-20 PROCEDURE — 74174 CTA ABD&PLVS W/CONTRAST: CPT

## 2022-11-20 PROCEDURE — 80053 COMPREHEN METABOLIC PANEL: CPT | Performed by: EMERGENCY MEDICINE

## 2022-11-20 PROCEDURE — 25010000002 IOPAMIDOL 61 % SOLUTION: Performed by: EMERGENCY MEDICINE

## 2022-11-20 PROCEDURE — 96375 TX/PRO/DX INJ NEW DRUG ADDON: CPT

## 2022-11-20 PROCEDURE — 87086 URINE CULTURE/COLONY COUNT: CPT | Performed by: EMERGENCY MEDICINE

## 2022-11-20 RX ORDER — HYDROMORPHONE HYDROCHLORIDE 1 MG/ML
0.5 INJECTION, SOLUTION INTRAMUSCULAR; INTRAVENOUS; SUBCUTANEOUS ONCE
Status: COMPLETED | OUTPATIENT
Start: 2022-11-20 | End: 2022-11-20

## 2022-11-20 RX ORDER — CYCLOBENZAPRINE HCL 5 MG
5 TABLET ORAL 3 TIMES DAILY PRN
Qty: 15 TABLET | Refills: 0 | Status: SHIPPED | OUTPATIENT
Start: 2022-11-20 | End: 2022-11-25

## 2022-11-20 RX ORDER — ONDANSETRON 2 MG/ML
4 INJECTION INTRAMUSCULAR; INTRAVENOUS ONCE
Status: COMPLETED | OUTPATIENT
Start: 2022-11-20 | End: 2022-11-20

## 2022-11-20 RX ORDER — SODIUM CHLORIDE 0.9 % (FLUSH) 0.9 %
10 SYRINGE (ML) INJECTION AS NEEDED
Status: DISCONTINUED | OUTPATIENT
Start: 2022-11-20 | End: 2022-11-20 | Stop reason: HOSPADM

## 2022-11-20 RX ADMIN — IOPAMIDOL 95 ML: 612 INJECTION, SOLUTION INTRAVENOUS at 11:23

## 2022-11-20 RX ADMIN — ONDANSETRON 4 MG: 2 INJECTION INTRAMUSCULAR; INTRAVENOUS at 11:02

## 2022-11-20 RX ADMIN — HYDROMORPHONE HYDROCHLORIDE 0.5 MG: 1 INJECTION, SOLUTION INTRAMUSCULAR; INTRAVENOUS; SUBCUTANEOUS at 11:02

## 2022-11-20 NOTE — ED TRIAGE NOTES
Patient to ed from  with complaints of lower back pain after lifting heavy on Wednesday. Patient has hx of AAA, reporting some lower abd pain.

## 2022-11-20 NOTE — ED NOTES
Pt to ED from home c/o back pain, pt reports lifting heavy furniture 11/16/22 and heard a pop in her back, pain has progressively worsened in lower back and radiates into lower abd, pt denies LOC, head injury, or loss of bowel/urine. Pt a/o x 4 at this time, spo2 04 % RA    PPE per protocol utilized throughout entire patient encounter.      alert and oriented x 3/responds to verbal commands

## 2022-11-21 LAB — BACTERIA SPEC AEROBE CULT: NO GROWTH

## 2022-11-26 DIAGNOSIS — M85.89 OSTEOPENIA OF MULTIPLE SITES: ICD-10-CM

## 2022-11-26 DIAGNOSIS — S32.020A COMPRESSION FRACTURE OF L2 VERTEBRA, INITIAL ENCOUNTER: Primary | ICD-10-CM

## 2022-11-29 ENCOUNTER — HOSPITAL ENCOUNTER (EMERGENCY)
Facility: HOSPITAL | Age: 67
Discharge: HOME | End: 2022-11-29
Attending: EMERGENCY MEDICINE | Admitting: EMERGENCY MEDICINE

## 2022-11-29 ENCOUNTER — APPOINTMENT (OUTPATIENT)
Dept: CT IMAGING | Facility: HOSPITAL | Age: 67
End: 2022-11-29

## 2022-11-29 ENCOUNTER — HOSPITAL ENCOUNTER (EMERGENCY)
Facility: HOSPITAL | Age: 67
Discharge: LEFT BEFORE DISCHARGE | End: 2022-11-29

## 2022-11-29 VITALS
TEMPERATURE: 97.5 F | RESPIRATION RATE: 16 BRPM | HEART RATE: 90 BPM | BODY MASS INDEX: 25.46 KG/M2 | HEIGHT: 68 IN | OXYGEN SATURATION: 96 % | SYSTOLIC BLOOD PRESSURE: 126 MMHG | DIASTOLIC BLOOD PRESSURE: 88 MMHG | WEIGHT: 168 LBS

## 2022-11-29 VITALS
SYSTOLIC BLOOD PRESSURE: 137 MMHG | HEIGHT: 68 IN | BODY MASS INDEX: 25.46 KG/M2 | DIASTOLIC BLOOD PRESSURE: 74 MMHG | RESPIRATION RATE: 15 BRPM | WEIGHT: 168 LBS | TEMPERATURE: 98.3 F | HEART RATE: 73 BPM | OXYGEN SATURATION: 95 %

## 2022-11-29 DIAGNOSIS — K59.00 CONSTIPATION, UNSPECIFIED CONSTIPATION TYPE: Primary | ICD-10-CM

## 2022-11-29 LAB
ALBUMIN SERPL-MCNC: 4.5 G/DL (ref 3.5–5.2)
ALBUMIN/GLOB SERPL: 1.5 G/DL
ALP SERPL-CCNC: 97 U/L (ref 39–117)
ALT SERPL W P-5'-P-CCNC: 21 U/L (ref 1–33)
ANION GAP SERPL CALCULATED.3IONS-SCNC: 10 MMOL/L (ref 5–15)
AST SERPL-CCNC: 15 U/L (ref 1–32)
BACTERIA UR QL AUTO: ABNORMAL /HPF
BASOPHILS # BLD AUTO: 0.06 10*3/MM3 (ref 0–0.2)
BASOPHILS NFR BLD AUTO: 0.9 % (ref 0–1.5)
BILIRUB SERPL-MCNC: 0.4 MG/DL (ref 0–1.2)
BILIRUB UR QL STRIP: NEGATIVE
BUN SERPL-MCNC: 10 MG/DL (ref 8–23)
BUN/CREAT SERPL: 11.4 (ref 7–25)
CALCIUM SPEC-SCNC: 9.6 MG/DL (ref 8.6–10.5)
CHLORIDE SERPL-SCNC: 101 MMOL/L (ref 98–107)
CLARITY UR: ABNORMAL
CO2 SERPL-SCNC: 27 MMOL/L (ref 22–29)
COLOR UR: YELLOW
CREAT SERPL-MCNC: 0.88 MG/DL (ref 0.57–1)
DEPRECATED RDW RBC AUTO: 43.6 FL (ref 37–54)
EGFRCR SERPLBLD CKD-EPI 2021: 72.1 ML/MIN/1.73
EOSINOPHIL # BLD AUTO: 0.17 10*3/MM3 (ref 0–0.4)
EOSINOPHIL NFR BLD AUTO: 2.5 % (ref 0.3–6.2)
ERYTHROCYTE [DISTWIDTH] IN BLOOD BY AUTOMATED COUNT: 12.7 % (ref 12.3–15.4)
GLOBULIN UR ELPH-MCNC: 3 GM/DL
GLUCOSE SERPL-MCNC: 142 MG/DL (ref 65–99)
GLUCOSE UR STRIP-MCNC: NEGATIVE MG/DL
HCT VFR BLD AUTO: 46.4 % (ref 34–46.6)
HGB BLD-MCNC: 15.3 G/DL (ref 12–15.9)
HGB UR QL STRIP.AUTO: ABNORMAL
HYALINE CASTS UR QL AUTO: ABNORMAL /LPF
IMM GRANULOCYTES # BLD AUTO: 0.01 10*3/MM3 (ref 0–0.05)
IMM GRANULOCYTES NFR BLD AUTO: 0.1 % (ref 0–0.5)
KETONES UR QL STRIP: NEGATIVE
LEUKOCYTE ESTERASE UR QL STRIP.AUTO: ABNORMAL
LIPASE SERPL-CCNC: 20 U/L (ref 13–60)
LYMPHOCYTES # BLD AUTO: 3.37 10*3/MM3 (ref 0.7–3.1)
LYMPHOCYTES NFR BLD AUTO: 49.7 % (ref 19.6–45.3)
MCH RBC QN AUTO: 30.9 PG (ref 26.6–33)
MCHC RBC AUTO-ENTMCNC: 33 G/DL (ref 31.5–35.7)
MCV RBC AUTO: 93.7 FL (ref 79–97)
MONOCYTES # BLD AUTO: 0.44 10*3/MM3 (ref 0.1–0.9)
MONOCYTES NFR BLD AUTO: 6.5 % (ref 5–12)
NEUTROPHILS NFR BLD AUTO: 2.73 10*3/MM3 (ref 1.7–7)
NEUTROPHILS NFR BLD AUTO: 40.3 % (ref 42.7–76)
NITRITE UR QL STRIP: NEGATIVE
NRBC BLD AUTO-RTO: 0 /100 WBC (ref 0–0.2)
PH UR STRIP.AUTO: 6.5 [PH] (ref 5–8)
PLATELET # BLD AUTO: 337 10*3/MM3 (ref 140–450)
PMV BLD AUTO: 8.6 FL (ref 6–12)
POTASSIUM SERPL-SCNC: 4 MMOL/L (ref 3.5–5.2)
PROT SERPL-MCNC: 7.5 G/DL (ref 6–8.5)
PROT UR QL STRIP: ABNORMAL
RBC # BLD AUTO: 4.95 10*6/MM3 (ref 3.77–5.28)
RBC # UR STRIP: ABNORMAL /HPF
REF LAB TEST METHOD: ABNORMAL
SODIUM SERPL-SCNC: 138 MMOL/L (ref 136–145)
SP GR UR STRIP: 1.01 (ref 1–1.03)
SQUAMOUS #/AREA URNS HPF: ABNORMAL /HPF
UROBILINOGEN UR QL STRIP: ABNORMAL
WBC # UR STRIP: ABNORMAL /HPF
WBC NRBC COR # BLD: 6.78 10*3/MM3 (ref 3.4–10.8)

## 2022-11-29 PROCEDURE — 99211 OFF/OP EST MAY X REQ PHY/QHP: CPT

## 2022-11-29 PROCEDURE — 99283 EMERGENCY DEPT VISIT LOW MDM: CPT

## 2022-11-29 PROCEDURE — 74176 CT ABD & PELVIS W/O CONTRAST: CPT

## 2022-11-29 PROCEDURE — 85025 COMPLETE CBC W/AUTO DIFF WBC: CPT | Performed by: EMERGENCY MEDICINE

## 2022-11-29 PROCEDURE — 80053 COMPREHEN METABOLIC PANEL: CPT | Performed by: EMERGENCY MEDICINE

## 2022-11-29 PROCEDURE — 81001 URINALYSIS AUTO W/SCOPE: CPT | Performed by: EMERGENCY MEDICINE

## 2022-11-29 PROCEDURE — 83690 ASSAY OF LIPASE: CPT | Performed by: EMERGENCY MEDICINE

## 2022-11-29 PROCEDURE — 36415 COLL VENOUS BLD VENIPUNCTURE: CPT | Performed by: EMERGENCY MEDICINE

## 2022-11-29 RX ORDER — POLYETHYLENE GLYCOL 3350 17 G/17G
17 POWDER, FOR SOLUTION ORAL DAILY
Qty: 507 G | Refills: 0 | Status: SHIPPED | OUTPATIENT
Start: 2022-11-29

## 2022-12-20 ENCOUNTER — HOSPITAL ENCOUNTER (OUTPATIENT)
Dept: CT IMAGING | Facility: HOSPITAL | Age: 67
Discharge: HOME | End: 2022-12-20
Admitting: PHYSICIAN ASSISTANT

## 2022-12-20 PROCEDURE — 72131 CT LUMBAR SPINE W/O DYE: CPT

## 2022-12-22 PROBLEM — S32.020A COMPRESSION FRACTURE OF L2 LUMBAR VERTEBRA: Status: ACTIVE | Noted: 2022-12-22

## 2023-01-06 ENCOUNTER — INFUSION (OUTPATIENT)
Dept: ONCOLOGY | Facility: HOSPITAL | Age: 68
End: 2023-01-06
Payer: COMMERCIAL

## 2023-01-06 ENCOUNTER — LAB (OUTPATIENT)
Dept: OTHER | Facility: HOSPITAL | Age: 68
End: 2023-01-06
Payer: COMMERCIAL

## 2023-01-06 VITALS — TEMPERATURE: 97 F | BODY MASS INDEX: 25.74 KG/M2 | WEIGHT: 164 LBS | RESPIRATION RATE: 18 BRPM | HEIGHT: 67 IN

## 2023-01-06 DIAGNOSIS — M85.89 OSTEOPENIA OF MULTIPLE SITES: ICD-10-CM

## 2023-01-06 DIAGNOSIS — S32.020A COMPRESSION FRACTURE OF L2 VERTEBRA, INITIAL ENCOUNTER: Primary | ICD-10-CM

## 2023-01-06 DIAGNOSIS — S32.020A COMPRESSION FRACTURE OF L2 VERTEBRA, INITIAL ENCOUNTER: ICD-10-CM

## 2023-01-06 LAB
25(OH)D3 SERPL-MCNC: 33.1 NG/ML (ref 30–100)
ALBUMIN SERPL-MCNC: 4.1 G/DL (ref 3.5–5.2)
ALBUMIN/GLOB SERPL: 1.5 G/DL
ALP SERPL-CCNC: 83 U/L (ref 39–117)
ALT SERPL W P-5'-P-CCNC: 19 U/L (ref 1–33)
ANION GAP SERPL CALCULATED.3IONS-SCNC: 7.2 MMOL/L (ref 5–15)
AST SERPL-CCNC: 17 U/L (ref 1–32)
BILIRUB SERPL-MCNC: 0.3 MG/DL (ref 0–1.2)
BUN SERPL-MCNC: 16 MG/DL (ref 8–23)
BUN/CREAT SERPL: 15.5 (ref 7–25)
CALCIUM SPEC-SCNC: 9.7 MG/DL (ref 8.6–10.5)
CHLORIDE SERPL-SCNC: 104 MMOL/L (ref 98–107)
CO2 SERPL-SCNC: 28.8 MMOL/L (ref 22–29)
CREAT SERPL-MCNC: 1.03 MG/DL (ref 0.57–1)
EGFRCR SERPLBLD CKD-EPI 2021: 59.7 ML/MIN/1.73
GLOBULIN UR ELPH-MCNC: 2.7 GM/DL
GLUCOSE SERPL-MCNC: 103 MG/DL (ref 65–99)
MAGNESIUM SERPL-MCNC: 1.9 MG/DL (ref 1.6–2.4)
PHOSPHATE SERPL-MCNC: 3.6 MG/DL (ref 2.5–4.5)
POTASSIUM SERPL-SCNC: 4.3 MMOL/L (ref 3.5–5.2)
PROT SERPL-MCNC: 6.8 G/DL (ref 6–8.5)
SODIUM SERPL-SCNC: 140 MMOL/L (ref 136–145)

## 2023-01-06 PROCEDURE — 84100 ASSAY OF PHOSPHORUS: CPT | Performed by: PHYSICIAN ASSISTANT

## 2023-01-06 PROCEDURE — 25010000002 DENOSUMAB 60 MG/ML SOLUTION PREFILLED SYRINGE: Performed by: PHYSICIAN ASSISTANT

## 2023-01-06 PROCEDURE — 80053 COMPREHEN METABOLIC PANEL: CPT | Performed by: PHYSICIAN ASSISTANT

## 2023-01-06 PROCEDURE — 96372 THER/PROPH/DIAG INJ SC/IM: CPT

## 2023-01-06 PROCEDURE — 82306 VITAMIN D 25 HYDROXY: CPT | Performed by: PHYSICIAN ASSISTANT

## 2023-01-06 PROCEDURE — 83735 ASSAY OF MAGNESIUM: CPT | Performed by: PHYSICIAN ASSISTANT

## 2023-01-06 RX ADMIN — DENOSUMAB 60 MG: 60 INJECTION SUBCUTANEOUS at 15:58

## 2023-01-06 NOTE — NURSING NOTE
Arrived  for prolia injection. Indication and side effects reviewed. Denies recent dental work. Labs and medications verified. Pt will reach out to Gretchen King's office to ask about starting a calcium and vitamin D supplement. Pt has vitamin D listed in her medication list but is currently not taking. Prolia administered in R lower abdomen without incidence. Instructed to call prescribing MD for any concerns or questions and instructed on how to schedule future appts.  Pt vu and discharged in stable condition.

## 2023-01-09 NOTE — PROGRESS NOTES
Subjective   Patient ID: Nelsy Stokes is a 67 y.o. female is being seen for consultation today at the request of Gretchen King PA-C for L2 compression fracture. She has a recent lumbar CT preformed on 12/20/2022.    History of Present Illness    67-year-old female who presents with an L2 compression fracture after referral by her PCP.  States on November 10, 2022, she was lifting a heavy box when she felt sudden onset pain and heard a pop in her low back.  She subsequently went to the emergency department and a CT was performed that revealed an L2 compression fracture.  She states she was sent home with pain medication and Flexeril.  Had relief with the pain medication and Flexeril.  She subsequently presented to her PCP who ordered a CT of her lumbar spine and referred her to be evaluated by us.  She had the CT of her lumbar spine on 12/20/22 and the compression fracture had progressed slightly since the scan 1 month prior.  Today she presents with mid to low back pain with radiation into the right lower extremity.  States that the mid to low back pain and right radicular pain are intermittent in nature.  States the pain is worsened with movement and walking.  Denies any numbness/tingling in her bilateral lower extremities.  Denies weakness in her bilateral lower extremities.  Denies any pain in the left lower extremity.  Denies incontinence of bowel/bladder.  Denies saddle numbness.    Review of Systems   Musculoskeletal: Positive for back pain and gait problem.   Neurological: Negative for weakness and numbness.       Tobacco Use: High Risk   • Smoking Tobacco Use: Every Day   • Smokeless Tobacco Use: Never   • Passive Exposure: Not on file     Nelsy Stokes  reports that she has been smoking cigarettes. She has been smoking an average of 1 pack per day. She has never used smokeless tobacco.    Objective     Vitals:    01/10/23 0901   BP: 118/80   Pulse: 80   Temp: 97.1 °F (36.2 °C)   SpO2: 96%   Weight:  74.8 kg (165 lb)   Height: 170.2 cm (67.01\")     Body mass index is 25.84 kg/m².      Physical Exam  Vitals reviewed.   Constitutional:       General: She is not in acute distress.     Appearance: Normal appearance. She is normal weight. She is not ill-appearing, toxic-appearing or diaphoretic.   HENT:      Head: Normocephalic and atraumatic.   Pulmonary:      Effort: Pulmonary effort is normal. No respiratory distress.   Neurological:      Mental Status: She is alert and oriented to person, place, and time.      Motor: Motor strength is normal.      Gait: Gait is intact.      Deep Tendon Reflexes:      Reflex Scores:       Patellar reflexes are 1+ on the right side and 1+ on the left side.       Achilles reflexes are 1+ on the right side and 1+ on the left side.  Psychiatric:         Mood and Affect: Mood normal.         Speech: Speech normal.         Behavior: Behavior normal.         Thought Content: Thought content normal.         Judgment: Judgment normal.       Neurologic Exam     Mental Status   Oriented to person, place, and time.   Attention: normal. Concentration: normal.   Speech: speech is normal   Level of consciousness: alert  Knowledge: good.   Normal comprehension.     Motor Exam   Muscle bulk: normal  Overall muscle tone: normal  Right arm tone: normal  Left arm tone: normal  Right leg tone: normal  Left leg tone: normal    Strength   Strength 5/5 throughout.     Sensory Exam   Light touch normal.     Gait, Coordination, and Reflexes     Gait  Gait: normal    Reflexes   Right patellar: 1+  Left patellar: 1+  Right achilles: 1+  Left achilles: 1+  Right ankle clonus: absent  Left ankle clonus: absent          Assessment & Plan   Independent Review of Radiographic Studies:      I personally reviewed the images from the following studies. Agree with radiology.      CT lumbar spine wo contrast    Result Date: 12/22/2022  1. There is a subacute compression fracture involving the inferior body and  endplate of the L2 lumbar vertebra where there is 30 to 40% loss of anterior, 20% loss of central and less than 10% loss of posterior vertebral body height. There is detachment of a small fracture fragment off the anterior inferior endplate of the compressed L2 vertebra. The compression fracture was present on the prior CT of the abdomen and pelvis on 2022 and it has slightly progressed as on that exam there was only 20% loss of anterior vertebral body height.  It is clearly a new compression fracture when compared to 2022 and is felt to be a subacute compression fracture. 2. There is lumbar spondylosis as described. 3. At L3-4 there is moderate facet overgrowth,  a 4 mm degenerative anterolisthesis of L3 on L4 and mild-to-moderate canal and mild left foraminal narrowing. 4. At L4-5 there is diffuse posterior disc bulge, mild left and moderate right facet overgrowth and there is mild canal narrowing.  There is minimal left foraminal narrowing and eccentric spurring and bulging disc material into the inferior right foramen and to the far right laterally that mild to moderately narrows the right foramen and abuts the anteromedial aspect of the right L4 nerve root within the right foramen and lateral to the foramen to the far right laterally at L4-5. 5. At L5-S1 there is minimal facet overgrowth and a mild diffuse posterior disc bulge but no canal or lateral recess narrowing.  There is eccentric spurring and bulging disc material into the inferior left foramen and to the far left laterally mild-to-moderately narrowing the left foramen abutting the undersurface of the exiting left L5 nerve root. The remainder of the lumbar spine CT is unremarkable.  Radiation dose reduction techniques were utilized, including automated exposure control and exposure modulation based on body size.  This report was finalized on 2022 7:16 AM by Dr. Rafi Dixon M.D.      Medical Decision Makin-year-old female who  presents with new onset low back pain with radiation to the right lower extremity that started following an injury approximately 6 weeks ago.  On exam, she is neurologically intact.  She does not have any red flag symptoms of cauda equina syndrome.  Her fracture slightly progressed from imaging on 11/20/2 to 2 imaging on 12/20/22.  Given the worsening of her symptoms and further progression of her fracture, I recommended she wear an LSO brace.  I have ordered her an LSO brace.  I have also ordered an MRI of her lumbar spine since she is having radicular symptoms.  I have ordered her Flexeril as she had relief when provided this from the ER.  For her radicular symptoms, I have also given her Medrol Dosepak.  I discussed with her potential treatment options for fracture including kyphoplasty versus if a different surgery may be needed since she is having radicular symptoms.  I told her that to know what the treatment options would be going forward, it would be important to get the MRI and see what the fracture looks like as it may have progressed.  We will have her follow-up with Dr. Kay following her MRI to discuss treatment options.  I discussed with her that she should call the office for any worsening numbness/tingling, pain, weakness in the meantime.  Patient was agreeable to the plan.     Diagnoses and all orders for this visit:    1. Closed compression fracture of L2 lumbar vertebra, sequela (Primary)  -     MRI Lumbar Spine Without Contrast; Future  -     Back Brace    Other orders  -     methylPREDNISolone (MEDROL) 4 MG dose pack; Take as directed on package instructions.  Dispense: 21 tablet; Refill: 0  -     cyclobenzaprine (FLEXERIL) 10 MG tablet; Take 0.5 tablets by mouth 3 (Three) Times a Day As Needed for Muscle Spasms.  Dispense: 30 tablet; Refill: 1      Return for Follow up with Dr. Kay after imaging.

## 2023-01-10 ENCOUNTER — OFFICE VISIT (OUTPATIENT)
Dept: NEUROSURGERY | Facility: CLINIC | Age: 68
End: 2023-01-10
Payer: COMMERCIAL

## 2023-01-10 VITALS
SYSTOLIC BLOOD PRESSURE: 118 MMHG | HEIGHT: 67 IN | DIASTOLIC BLOOD PRESSURE: 80 MMHG | WEIGHT: 165 LBS | HEART RATE: 80 BPM | TEMPERATURE: 97.1 F | BODY MASS INDEX: 25.9 KG/M2 | OXYGEN SATURATION: 96 %

## 2023-01-10 DIAGNOSIS — S32.020S CLOSED COMPRESSION FRACTURE OF L2 LUMBAR VERTEBRA, SEQUELA: Primary | ICD-10-CM

## 2023-01-10 PROCEDURE — 99214 OFFICE O/P EST MOD 30 MIN: CPT

## 2023-01-10 RX ORDER — CYCLOBENZAPRINE HCL 10 MG
5 TABLET ORAL 3 TIMES DAILY PRN
Qty: 30 TABLET | Refills: 1 | Status: SHIPPED | OUTPATIENT
Start: 2023-01-10

## 2023-01-10 RX ORDER — METHYLPREDNISOLONE 4 MG/1
TABLET ORAL
Qty: 21 TABLET | Refills: 0 | Status: SHIPPED | OUTPATIENT
Start: 2023-01-10 | End: 2023-02-09

## 2023-01-10 RX ORDER — FLUTICASONE PROPIONATE 50 MCG
2 SPRAY, SUSPENSION (ML) NASAL
COMMUNITY
Start: 2022-11-05 | End: 2023-02-09

## 2023-01-13 ENCOUNTER — PATIENT ROUNDING (BHMG ONLY) (OUTPATIENT)
Dept: NEUROSURGERY | Facility: CLINIC | Age: 68
End: 2023-01-13
Payer: COMMERCIAL

## 2023-01-13 RX ORDER — DILTIAZEM HYDROCHLORIDE 120 MG/1
CAPSULE, EXTENDED RELEASE ORAL
Qty: 90 CAPSULE | Refills: 0 | Status: SHIPPED | OUTPATIENT
Start: 2023-01-13

## 2023-01-15 RX ORDER — CHOLECALCIFEROL (VITAMIN D3) 25 MCG
TABLET,CHEWABLE ORAL
Qty: 90 LOZENGE | Refills: 1 | Status: SHIPPED | OUTPATIENT
Start: 2023-01-15

## 2023-01-16 ENCOUNTER — TELEPHONE (OUTPATIENT)
Dept: NEUROSURGERY | Facility: CLINIC | Age: 68
End: 2023-01-16
Payer: COMMERCIAL

## 2023-01-16 NOTE — TELEPHONE ENCOUNTER
Caller: Nelsy Stokes    Relationship to patient: Self    Best call back number: [phone]   Patient is needing: PATIENT IS NEEDING A FOLLOW UP W/DR. SNOWDEN PER LAST OV NOTES WITH JEET RIZVI.      PATIENT'S MRI IS SCHEDULED 01.20.23      PLEASE CALL PATIENT TO SCHEDULE

## 2023-01-20 ENCOUNTER — HOSPITAL ENCOUNTER (OUTPATIENT)
Dept: MRI IMAGING | Facility: HOSPITAL | Age: 68
Discharge: HOME | End: 2023-01-20
Payer: COMMERCIAL

## 2023-01-20 DIAGNOSIS — S32.020S CLOSED COMPRESSION FRACTURE OF L2 LUMBAR VERTEBRA, SEQUELA: ICD-10-CM

## 2023-01-20 PROCEDURE — 72148 MRI LUMBAR SPINE W/O DYE: CPT

## 2023-01-30 NOTE — TELEPHONE ENCOUNTER
Caller: Nelsy Stokes    Relationship: Self    Best call back number: [phone]    What is the best time to reach you:ANYTIME    Who are you requesting to speak with (clinical staff, provider,  specific staff member):CLINICAL STAFF    Do you know the name of the person who called:MIKEY    What was the call regarding:PT CALLED AND STATES THAT SHE MISSED A CALL FROM [facility] FOR A SOONER APPT. TRIED TO WARM TRANSFER SPOKE WITH COLETTE AND SHE TRANSFERRED TO [facility]-NO ANSWER UNABLE TO LVM-PT WOULD LIKE A CALL BACK IF SOONER APPT. IS STILL AVAILABLE THANK YOU    Do you require a callback:YES

## 2023-01-31 ENCOUNTER — PREP FOR SURGERY (OUTPATIENT)
Dept: OTHER | Facility: HOSPITAL | Age: 68
End: 2023-01-31
Payer: COMMERCIAL

## 2023-01-31 ENCOUNTER — OFFICE VISIT (OUTPATIENT)
Dept: NEUROSURGERY | Facility: CLINIC | Age: 68
End: 2023-01-31
Payer: COMMERCIAL

## 2023-01-31 VITALS
WEIGHT: 170 LBS | DIASTOLIC BLOOD PRESSURE: 78 MMHG | HEIGHT: 67 IN | HEART RATE: 82 BPM | OXYGEN SATURATION: 96 % | TEMPERATURE: 96.8 F | BODY MASS INDEX: 26.68 KG/M2 | SYSTOLIC BLOOD PRESSURE: 122 MMHG

## 2023-01-31 DIAGNOSIS — S32.020S CLOSED COMPRESSION FRACTURE OF L2 LUMBAR VERTEBRA, SEQUELA: Primary | ICD-10-CM

## 2023-01-31 DIAGNOSIS — Z01.818 PRE-OP EVALUATION: ICD-10-CM

## 2023-01-31 DIAGNOSIS — S32.030G CLOSED WEDGE COMPRESSION FRACTURE OF L3 VERTEBRA WITH DELAYED HEALING, SUBSEQUENT ENCOUNTER: ICD-10-CM

## 2023-01-31 PROCEDURE — 99214 OFFICE O/P EST MOD 30 MIN: CPT | Performed by: NEUROLOGICAL SURGERY

## 2023-01-31 NOTE — PROGRESS NOTES
Subjective   Patient ID: Nelsy Stokes is a 67 y.o. female is here today for follow-up.  She reports that on 11/15/2022 she was trying to lift an entertainment a painful pop in her back.  She reports that the pain was severe.  She still has daily moderate to severe pain in her upper lumbar spine.  She reports that the pain does not radiate into her lower extremities.  Denies any changes in strength or sensation.  She reports that she has had no improvement over the past 2 months    History of Present Illness    The following portions of the patient's history were reviewed and updated as appropriate: allergies, past family history, past medical history, past social history, past surgical history and problem list.     Past Medical History:   Diagnosis Date   • Acid reflux    • Anxiety    • Aortic atherosclerosis ([facility]) 10/23/2018   • APC (atrial premature contractions) 10/14/2022   • Asthma    • Carotid artery disease ([facility])    • Carotid artery stenosis     left   • Compression fracture of L2 lumbar vertebra ([facility]) 12/22/2022   • COPD (chronic obstructive pulmonary disease) ([facility])    • Coronary artery disease    • Gallbladder disease    • GERD (gastroesophageal reflux disease)    • History of bradycardia    • History of chest pain     ECHO AND STRESS TEST IN EPIC FROM 2/2020   • History of colon polyps    • Hyperlipidemia    • Hypertension    • Nausea    • PONV (postoperative nausea and vomiting)    • PVD (peripheral vascular disease) ([facility])    • Subclavian artery stenosis ([facility])    • Tobacco abuse    • Umbilical hernia      Allergies   Allergen Reactions   • Pitavastatin Myalgia     Other reaction(s): Myalgia     Past Surgical History:   Procedure Laterality Date   • CAROTID STENT Right 2018   • CHEST WALL BIOPSY  09/2022   • CHOLECYSTECTOMY WITH INTRAOPERATIVE CHOLANGIOGRAM N/A 11/18/2020    Procedure: LAPAROSCOPIC CHOLECYSTECTOMY WITH INTRAOPERATIVE CHOLANGIOGRAM, OPEN UMBILICAL HERNIA REPAIR;  Surgeon: To  MD Maria D;  Location: [facility] MAIN OR;  Service: General;  Laterality: N/A;   • COLONOSCOPY N/A 2017   • ENDOSCOPY N/A 10/09/2020    Procedure: ESOPHAGOGASTRODUODENOSCOPY WITH COLD BIOPSIES;  Surgeon: Jacque Huber MD;  Location: [facility] ENDOSCOPY;  Service: Gastroenterology;  Laterality: N/A;  PRE: REFLUX  POST: ESOPHAGITIS, GASTRITIS, DUODENITIS   • EXTRACORPOREAL SHOCK WAVE LITHOTRIPSY (ESWL)  09/09/2020    [facility] IN [place]   • KNEE CARTILAGE SURGERY Right 1990s     Current Outpatient Medications on File Prior to Visit   Medication Sig Dispense Refill   • albuterol sulfate  (90 Base) MCG/ACT inhaler Inhale 2 puffs Every 4 (Four) Hours As Needed for Wheezing. 24 g 3   • aspirin 81 MG EC tablet Take 81 mg by mouth Daily. Instructed pt to follow md instructions regarding holding for surgery     • Cholecalciferol 50 MCG (2000 UT) capsule One PO daily 90 each 3   • Cyanocobalamin (B-12) 1000 MCG lozenge PLACE ONE TABLET UNDER THE TONGUE DAILY 90 lozenge 1   • cyclobenzaprine (FLEXERIL) 10 MG tablet Take 0.5 tablets by mouth 3 (Three) Times a Day As Needed for Muscle Spasms. 30 tablet 1   • dilTIAZem XR (DILACOR XR) 120 MG 24 hr capsule TAKE ONE CAPSULE BY MOUTH DAILY 90 capsule 0   • ezetimibe (ZETIA) 10 MG tablet Take 1 tablet by mouth Daily. For cholesterol 90 tablet 1   • fluticasone (FLONASE) 50 MCG/ACT nasal spray      • Fluticasone-Umeclidin-Vilant (Trelegy Ellipta) 100-62.5-25 MCG/INH inhaler Inhale 1 puff Daily. 60 each 5   • folic acid (FOLVITE) 1 MG tablet Take 1 tablet by mouth Daily. 90 tablet 1   • metFORMIN ER (GLUCOPHAGE-XR) 500 MG 24 hr tablet 2 PO daily with food for impaired fasting glucose 180 tablet 3   • methylPREDNISolone (MEDROL) 4 MG dose pack Take as directed on package instructions. 21 tablet 0   • pantoprazole (Protonix) 40 MG EC tablet One PO daily for GERD 90 tablet 1   • Pitavastatin Calcium 4 MG tablet Take 1 tablet by mouth Every Night. For cholesterol 90  "tablet 3   • polyethylene glycol (MIRALAX) 17 GM/SCOOP powder Take 17 g by mouth Daily. 507 g 0   • valsartan (DIOVAN) 320 MG tablet Take 1 tablet by mouth Daily. for HTN 90 tablet 1     No current facility-administered medications on file prior to visit.         Review of Systems   Constitutional: Positive for fatigue. Negative for activity change.   HENT: Negative for ear discharge, ear pain, tinnitus and trouble swallowing.    Eyes: Negative for pain.   Respiratory: Negative for chest tightness and shortness of breath.    Cardiovascular: Negative for chest pain.   Gastrointestinal: Negative for abdominal pain, nausea and vomiting.   Musculoskeletal: Positive for back pain. Negative for gait problem, neck pain and neck stiffness.   Neurological: Positive for light-headedness. Negative for dizziness, weakness, numbness and headaches.   Psychiatric/Behavioral: Positive for sleep disturbance. Negative for confusion and decreased concentration.         Objective     Vitals:    01/31/23 1102   BP: 122/78   Pulse: 82   Temp: 96.8 °F (36 °C)   SpO2: 96%   Weight: 77.1 kg (170 lb)   Height: 170.2 cm (67.01\")     Body mass index is 26.62 kg/m².    Tobacco Use: High Risk   • Smoking Tobacco Use: Every Day   • Smokeless Tobacco Use: Never   • Passive Exposure: Not on file          Physical Exam  Neurologic Exam    Awake, alert, oriented x3  Pupils equal round reactive to light  Extraocular muscles intact  Face symmetric  Speech is fluent and clear  No pronator drift  Motor exam  Bilateral deltoids 5/5, bilateral biceps 5/5, bilateral triceps 5/5, bilateral wrist extension 5/5 bilateral hand  5/5  Bilateral hip flexion 5/5, bilateral knee extension 5/5, bilateral DF/PF 5/5  No clonus  No Oni's reflex  Steady normal gait  Able to detect  light touch in all 4 extremities      Assessment & Plan   Independent Review of Radiographic Studies:      I personally reviewed the images from the following studies.  MRI of the " lumbar spine from 2023  The MRI images demonstrate an L2 compression fracture with approximately 20 to 30% loss of height and a L3 compression fracture with no significant loss of height.  There is increased T2 signal within both vertebral bodies    Medical Decision Makin-year-old female with L2 and L3 compression fractures from lifting a heavy object on 11/15/2022  -She reports that she has not had any improvement in her back pain since that time.  She reports that the pain continues to be severe.  We discussed the risks and benefits of an L2 and L3 kyphoplasty.  We talked about the risks including, but not limited to the risk of infection, worsening of spinal stenosis, paralysis, hemorrhage.  She expressed understanding of the risks and benefits and elected to proceed at the next available date.  -Of note she takes aspirin 81 mg daily.  I have asked her to stop taking aspirin 5 days before the procedure.  She can most likely resume taking aspirin the day after the procedure  -I stressed the importance of smoking cessation to slow the progression of her advanced degenerative spinal disease and atherosclerotic disease.  -We also discussed that the presence of moderate lumbar stenosis at the L2-3, L3-4 and L4-5 spinal levels.  She is asymptomatic at this time.  We will continue to monitor for any signs/symptoms of neurogenic claudication    Diagnoses and all orders for this visit:    1. Closed compression fracture of L2 lumbar vertebra, sequela (Primary)    2. Closed wedge compression fracture of L3 vertebra with delayed healing, subsequent encounter      Return for L2 and L3 kyphoplasty.

## 2023-02-09 ENCOUNTER — PRE-ADMISSION TESTING (OUTPATIENT)
Dept: PREADMISSION TESTING | Facility: HOSPITAL | Age: 68
End: 2023-02-09
Payer: COMMERCIAL

## 2023-02-09 VITALS
RESPIRATION RATE: 20 BRPM | TEMPERATURE: 98.1 F | BODY MASS INDEX: 26.12 KG/M2 | OXYGEN SATURATION: 97 % | WEIGHT: 166.4 LBS | HEIGHT: 67 IN | HEART RATE: 75 BPM | SYSTOLIC BLOOD PRESSURE: 158 MMHG | DIASTOLIC BLOOD PRESSURE: 79 MMHG

## 2023-02-09 DIAGNOSIS — Z01.818 PRE-OP EVALUATION: ICD-10-CM

## 2023-02-09 DIAGNOSIS — S32.020S CLOSED COMPRESSION FRACTURE OF L2 LUMBAR VERTEBRA, SEQUELA: ICD-10-CM

## 2023-02-09 DIAGNOSIS — S32.030G CLOSED WEDGE COMPRESSION FRACTURE OF L3 VERTEBRA WITH DELAYED HEALING, SUBSEQUENT ENCOUNTER: ICD-10-CM

## 2023-02-09 LAB
ANION GAP SERPL CALCULATED.3IONS-SCNC: 10 MMOL/L (ref 5–15)
BUN SERPL-MCNC: 14 MG/DL (ref 8–23)
BUN/CREAT SERPL: 15.4 (ref 7–25)
CALCIUM SPEC-SCNC: 9 MG/DL (ref 8.6–10.5)
CHLORIDE SERPL-SCNC: 105 MMOL/L (ref 98–107)
CO2 SERPL-SCNC: 26 MMOL/L (ref 22–29)
CREAT SERPL-MCNC: 0.91 MG/DL (ref 0.57–1)
DEPRECATED RDW RBC AUTO: 43.5 FL (ref 37–54)
EGFRCR SERPLBLD CKD-EPI 2021: 69.3 ML/MIN/1.73
ERYTHROCYTE [DISTWIDTH] IN BLOOD BY AUTOMATED COUNT: 12.8 % (ref 12.3–15.4)
GLUCOSE SERPL-MCNC: 105 MG/DL (ref 65–99)
HCT VFR BLD AUTO: 44 % (ref 34–46.6)
HGB BLD-MCNC: 14.3 G/DL (ref 12–15.9)
MCH RBC QN AUTO: 30.6 PG (ref 26.6–33)
MCHC RBC AUTO-ENTMCNC: 32.5 G/DL (ref 31.5–35.7)
MCV RBC AUTO: 94 FL (ref 79–97)
PLATELET # BLD AUTO: 308 10*3/MM3 (ref 140–450)
PMV BLD AUTO: 8.5 FL (ref 6–12)
POTASSIUM SERPL-SCNC: 4.4 MMOL/L (ref 3.5–5.2)
RBC # BLD AUTO: 4.68 10*6/MM3 (ref 3.77–5.28)
SODIUM SERPL-SCNC: 141 MMOL/L (ref 136–145)
WBC NRBC COR # BLD: 8.42 10*3/MM3 (ref 3.4–10.8)

## 2023-02-09 PROCEDURE — 80048 BASIC METABOLIC PNL TOTAL CA: CPT

## 2023-02-09 PROCEDURE — 93010 ELECTROCARDIOGRAM REPORT: CPT | Performed by: INTERNAL MEDICINE

## 2023-02-09 PROCEDURE — 36415 COLL VENOUS BLD VENIPUNCTURE: CPT

## 2023-02-09 PROCEDURE — 85027 COMPLETE CBC AUTOMATED: CPT

## 2023-02-09 PROCEDURE — 93005 ELECTROCARDIOGRAM TRACING: CPT

## 2023-02-09 RX ORDER — CHLORHEXIDINE GLUCONATE 500 MG/1
CLOTH TOPICAL
COMMUNITY

## 2023-02-09 NOTE — DISCHARGE INSTRUCTIONS
CHLORHEXIDINE CLOTH INSTRUCTIONS  The morning of surgery follow these instructions using the Chlorhexidine cloths you've been given.  These steps reduce bacteria on the body.  Do not use the cloths near your eyes, ears mouth, genitalia or on open wounds.  Throw the cloths away after use but do not try to flush them down a toilet.      Open and remove one cloth at a time from the package.    Leave the cloth unfolded and begin the bathing.  Massage the skin with the cloths using gentle pressure to remove bacteria.  Do not scrub harshly.   Follow the steps below with one 2% CHG cloth per area (6 total cloths).  One cloth for neck, shoulders and chest.  One cloth for both arms, hands, fingers and underarms (do underarms last).  One cloth for the abdomen followed by groin.  One cloth for right leg and foot including between the toes.  One cloth for left leg and foot including between the toes.  The last cloth is to be used for the back of the neck, back and buttocks.    Allow the CHG to air dry 3 minutes on the skin which will give it time to work and decrease the chance of irritation.  The skin may feel sticky until it is dry.  Do not rinse with water or any other liquid or you will lose the beneficial effects of the CHG.  If mild skin irritation occurs, do rinse the skin to remove the CHG.  Report this to the nurse at time of admission.  Do not apply lotions, creams, ointments, deodorants or perfumes after using the clothes. Dress in clean clothes before coming to the hospital.     Take the following medications the morning of surgery:  INHALER, DILTIAZEM, AND PANTOPRAZOLE    ARRIVAL TIME 1000 ON 2/14/23      If you are on prescription narcotic pain medication to control your pain you may also take that medication the morning of surgery.    General Instructions:  Do not eat solid food after midnight the night before surgery.  You may drink clear liquids day of surgery but must stop at least one hour before your  hospital arrival time.  It is beneficial for you to have a clear drink that contains carbohydrates the day of surgery.  We suggest a 12 to 20 ounce bottle of Gatorade or Powerade for non-diabetic patients or a 12 to 20 ounce bottle of G2 or Powerade Zero for diabetic patients. (Pediatric patients, are not advised to drink a 12 to 20 ounce carbohydrate drink)    Clear liquids are liquids you can see through.  Nothing red in color.     Plain water                               Sports drinks  Sodas                                   Gelatin (Jell-O)  Fruit juices without pulp such as white grape juice and apple juice  Popsicles that contain no fruit or yogurt  Tea or coffee (no cream or milk added)  Gatorade / Powerade  G2 / Powerade Zero    Infants may have breast milk up to four hours before surgery.  Infants drinking formula may drink formula up to six hours before surgery.   Patients who avoid smoking, chewing tobacco and alcohol for 4 weeks prior to surgery have a reduced risk of post-operative complications.  Quit smoking as many days before surgery as you can.  Do not smoke, use chewing tobacco or drink alcohol the day of surgery.   If applicable bring your C-PAP/ BI-PAP machine.  Bring any papers given to you in the doctor’s office.  Wear clean comfortable clothes.  Do not wear contact lenses, false eyelashes or make-up.  Bring a case for your glasses.   Bring crutches or walker if applicable.  Remove all piercings.  Leave jewelry and any other valuables at home.  Hair extensions with metal clips must be removed prior to surgery.  The Pre-Admission Testing nurse will instruct you to bring medications if unable to obtain an accurate list in Pre-Admission Testing.            Preventing a Surgical Site Infection:  For 2 to 3 days before surgery, avoid shaving with a razor because the razor can irritate skin and make it easier to develop an infection.    Any areas of open skin can increase the risk of a  post-operative wound infection by allowing bacteria to enter and travel throughout the body.  Notify your surgeon if you have any skin wounds / rashes even if it is not near the expected surgical site.  The area will need assessed to determine if surgery should be delayed until it is healed.  The night prior to surgery shower using a fresh bar of anti-bacterial soap (such as Dial) and clean washcloth.  Sleep in a clean bed with clean clothing.  Do not allow pets to sleep with you.  Shower on the morning of surgery using a fresh bar of anti-bacterial soap (such as Dial) and clean washcloth.  Dry with a clean towel and dress in clean clothing.  Ask your surgeon if you will be receiving antibiotics prior to surgery.  Make sure you, your family, and all healthcare providers clean their hands with soap and water or an alcohol based hand  before caring for you or your wound.    Day of surgery:  Your arrival time is approximately two hours before your scheduled surgery time.  Upon arrival, a Pre-op nurse and Anesthesiologist will review your health history, obtain vital signs, and answer questions you may have.  The only belongings needed at this time will be a list of your home medications and if applicable your C-PAP/BI-PAP machine.  A Pre-op nurse will start an IV and you may receive medication in preparation for surgery, including something to help you relax.     Please be aware that surgery does come with discomfort.  We want to make every effort to control your discomfort so please discuss any uncontrolled symptoms with your nurse.   Your doctor will most likely have prescribed pain medications.      If you are going home after surgery you will receive individualized written care instructions before being discharged.  A responsible adult must drive you to and from the hospital on the day of your surgery and stay with you for 24 hours.  Discharge prescriptions can be filled by the hospital pharmacy during  regular pharmacy hours.  If you are having surgery late in the day/evening your prescription may be e-prescribed to your pharmacy.  Please verify your pharmacy hours or chose a 24 hour pharmacy to avoid not having access to your prescription because your pharmacy has closed for the day.    If you are staying overnight following surgery, you will be transported to your hospital room following the recovery period.  [facility] has all private rooms.    If you have any questions please call Pre-Admission Testing at [phone].  Deductibles and co-payments are collected on the day of service. Please be prepared to pay the required co-pay, deductible or deposit on the day of service as defined by your plan.    Call your surgeon immediately if you experience any of the following symptoms:  Sore Throat  Shortness of Breath or difficulty breathing  Cough  Chills  Body soreness or muscle pain  Headache  Fever  New loss of taste or smell  Do not arrive for your surgery ill.  Your procedure will need to be rescheduled to another time.  You will need to call your physician before the day of surgery to avoid any unnecessary exposure to hospital staff as well as other patients.

## 2023-02-10 ENCOUNTER — TELEPHONE (OUTPATIENT)
Dept: NEUROSURGERY | Facility: CLINIC | Age: 68
End: 2023-02-10
Payer: COMMERCIAL

## 2023-02-10 LAB — QT INTERVAL: 399 MS

## 2023-02-10 RX ORDER — EZETIMIBE 10 MG/1
TABLET ORAL
Qty: 90 TABLET | Refills: 1 | Status: SHIPPED | OUTPATIENT
Start: 2023-02-10

## 2023-02-10 NOTE — TELEPHONE ENCOUNTER
Spoke with patient regarding her past cardiac issues that were from October 2022 indicated by PAT. She states that she has not had any chest pain or palpitations since then and that she is comfortable with proceeding with kyphoplasty on February 14th.

## 2023-02-14 ENCOUNTER — ANESTHESIA (OUTPATIENT)
Dept: PERIOP | Facility: HOSPITAL | Age: 68
End: 2023-02-14
Payer: COMMERCIAL

## 2023-02-14 ENCOUNTER — APPOINTMENT (OUTPATIENT)
Dept: GENERAL RADIOLOGY | Facility: HOSPITAL | Age: 68
End: 2023-02-14
Payer: COMMERCIAL

## 2023-02-14 ENCOUNTER — HOSPITAL ENCOUNTER (OUTPATIENT)
Facility: HOSPITAL | Age: 68
Setting detail: HOSPITAL OUTPATIENT SURGERY
Discharge: HOME | End: 2023-02-14
Attending: NEUROLOGICAL SURGERY | Admitting: NEUROLOGICAL SURGERY
Payer: COMMERCIAL

## 2023-02-14 ENCOUNTER — ANESTHESIA EVENT (OUTPATIENT)
Dept: PERIOP | Facility: HOSPITAL | Age: 68
End: 2023-02-14
Payer: COMMERCIAL

## 2023-02-14 VITALS
HEIGHT: 67 IN | SYSTOLIC BLOOD PRESSURE: 162 MMHG | OXYGEN SATURATION: 93 % | WEIGHT: 166 LBS | DIASTOLIC BLOOD PRESSURE: 77 MMHG | TEMPERATURE: 97.5 F | BODY MASS INDEX: 26.06 KG/M2 | RESPIRATION RATE: 16 BRPM | HEART RATE: 76 BPM

## 2023-02-14 DIAGNOSIS — S32.020S CLOSED COMPRESSION FRACTURE OF L2 LUMBAR VERTEBRA, SEQUELA: ICD-10-CM

## 2023-02-14 DIAGNOSIS — S32.030G CLOSED WEDGE COMPRESSION FRACTURE OF L3 VERTEBRA WITH DELAYED HEALING, SUBSEQUENT ENCOUNTER: ICD-10-CM

## 2023-02-14 DIAGNOSIS — Z01.818 PRE-OP EVALUATION: ICD-10-CM

## 2023-02-14 PROCEDURE — 25010000002 PHENYLEPHRINE 10 MG/ML SOLUTION 5 ML VIAL: Performed by: NURSE ANESTHETIST, CERTIFIED REGISTERED

## 2023-02-14 PROCEDURE — C1713 ANCHOR/SCREW BN/BN,TIS/BN: HCPCS | Performed by: NEUROLOGICAL SURGERY

## 2023-02-14 PROCEDURE — 25010000002 PROPOFOL 10 MG/ML EMULSION: Performed by: NURSE ANESTHETIST, CERTIFIED REGISTERED

## 2023-02-14 PROCEDURE — 0 IOPAMIDOL 41 % SOLUTION: Performed by: NEUROLOGICAL SURGERY

## 2023-02-14 PROCEDURE — 25010000002 MAGNESIUM SULFATE PER 500 MG OF MAGNESIUM: Performed by: NURSE ANESTHETIST, CERTIFIED REGISTERED

## 2023-02-14 PROCEDURE — 88342 IMHCHEM/IMCYTCHM 1ST ANTB: CPT | Performed by: NEUROLOGICAL SURGERY

## 2023-02-14 PROCEDURE — 88307 TISSUE EXAM BY PATHOLOGIST: CPT | Performed by: NEUROLOGICAL SURGERY

## 2023-02-14 PROCEDURE — 25010000002 HYDROMORPHONE PER 4 MG: Performed by: NURSE ANESTHETIST, CERTIFIED REGISTERED

## 2023-02-14 PROCEDURE — 25010000002 FENTANYL CITRATE (PF) 50 MCG/ML SOLUTION: Performed by: NURSE ANESTHETIST, CERTIFIED REGISTERED

## 2023-02-14 PROCEDURE — 25010000002 DEXAMETHASONE SODIUM PHOSPHATE 20 MG/5ML SOLUTION: Performed by: NURSE ANESTHETIST, CERTIFIED REGISTERED

## 2023-02-14 PROCEDURE — 25010000002 CEFAZOLIN IN DEXTROSE 2-4 GM/100ML-% SOLUTION: Performed by: NEUROLOGICAL SURGERY

## 2023-02-14 PROCEDURE — 25010000002 ONDANSETRON PER 1 MG: Performed by: NURSE ANESTHETIST, CERTIFIED REGISTERED

## 2023-02-14 PROCEDURE — 22515 PERQ VERTEBRAL AUGMENTATION: CPT | Performed by: NEUROLOGICAL SURGERY

## 2023-02-14 PROCEDURE — 25010000002 NEOSTIGMINE 5 MG/10ML SOLUTION: Performed by: NURSE ANESTHETIST, CERTIFIED REGISTERED

## 2023-02-14 PROCEDURE — 22514 PERQ VERTEBRAL AUGMENTATION: CPT | Performed by: NEUROLOGICAL SURGERY

## 2023-02-14 PROCEDURE — 88311 DECALCIFY TISSUE: CPT | Performed by: NEUROLOGICAL SURGERY

## 2023-02-14 DEVICE — CEMENT C01A KYPHX HV-R BONE CEMENT EN
Type: IMPLANTABLE DEVICE | Site: SPINE LUMBAR | Status: FUNCTIONAL
Brand: KYPHON® HV-R® BONE CEMENT

## 2023-02-14 RX ORDER — LIDOCAINE HYDROCHLORIDE 20 MG/ML
INJECTION, SOLUTION INTRAVENOUS AS NEEDED
Status: DISCONTINUED | OUTPATIENT
Start: 2023-02-14 | End: 2023-02-14 | Stop reason: SURG

## 2023-02-14 RX ORDER — MAGNESIUM HYDROXIDE 1200 MG/15ML
LIQUID ORAL AS NEEDED
Status: DISCONTINUED | OUTPATIENT
Start: 2023-02-14 | End: 2023-02-14 | Stop reason: HOSPADM

## 2023-02-14 RX ORDER — SODIUM CHLORIDE, SODIUM LACTATE, POTASSIUM CHLORIDE, CALCIUM CHLORIDE 600; 310; 30; 20 MG/100ML; MG/100ML; MG/100ML; MG/100ML
9 INJECTION, SOLUTION INTRAVENOUS CONTINUOUS
Status: DISCONTINUED | OUTPATIENT
Start: 2023-02-14 | End: 2023-02-14 | Stop reason: HOSPADM

## 2023-02-14 RX ORDER — GLYCOPYRROLATE 0.2 MG/ML
INJECTION INTRAMUSCULAR; INTRAVENOUS AS NEEDED
Status: DISCONTINUED | OUTPATIENT
Start: 2023-02-14 | End: 2023-02-14 | Stop reason: SURG

## 2023-02-14 RX ORDER — HYDRALAZINE HYDROCHLORIDE 20 MG/ML
5 INJECTION INTRAMUSCULAR; INTRAVENOUS
Status: DISCONTINUED | OUTPATIENT
Start: 2023-02-14 | End: 2023-02-14 | Stop reason: HOSPADM

## 2023-02-14 RX ORDER — DIPHENHYDRAMINE HYDROCHLORIDE 50 MG/ML
12.5 INJECTION INTRAMUSCULAR; INTRAVENOUS
Status: DISCONTINUED | OUTPATIENT
Start: 2023-02-14 | End: 2023-02-14 | Stop reason: HOSPADM

## 2023-02-14 RX ORDER — FENTANYL CITRATE 50 UG/ML
50 INJECTION, SOLUTION INTRAMUSCULAR; INTRAVENOUS
Status: DISCONTINUED | OUTPATIENT
Start: 2023-02-14 | End: 2023-02-14 | Stop reason: HOSPADM

## 2023-02-14 RX ORDER — MIDAZOLAM HYDROCHLORIDE 1 MG/ML
0.5 INJECTION INTRAMUSCULAR; INTRAVENOUS
Status: DISCONTINUED | OUTPATIENT
Start: 2023-02-14 | End: 2023-02-14 | Stop reason: HOSPADM

## 2023-02-14 RX ORDER — DIPHENHYDRAMINE HCL 25 MG
25 CAPSULE ORAL
Status: DISCONTINUED | OUTPATIENT
Start: 2023-02-14 | End: 2023-02-14 | Stop reason: HOSPADM

## 2023-02-14 RX ORDER — ONDANSETRON 2 MG/ML
4 INJECTION INTRAMUSCULAR; INTRAVENOUS ONCE AS NEEDED
Status: DISCONTINUED | OUTPATIENT
Start: 2023-02-14 | End: 2023-02-14 | Stop reason: HOSPADM

## 2023-02-14 RX ORDER — SODIUM CHLORIDE 0.9 % (FLUSH) 0.9 %
3-10 SYRINGE (ML) INJECTION AS NEEDED
Status: DISCONTINUED | OUTPATIENT
Start: 2023-02-14 | End: 2023-02-14 | Stop reason: HOSPADM

## 2023-02-14 RX ORDER — LIDOCAINE HYDROCHLORIDE AND EPINEPHRINE 10; 10 MG/ML; UG/ML
INJECTION, SOLUTION INFILTRATION; PERINEURAL AS NEEDED
Status: DISCONTINUED | OUTPATIENT
Start: 2023-02-14 | End: 2023-02-14 | Stop reason: HOSPADM

## 2023-02-14 RX ORDER — HYDROMORPHONE HYDROCHLORIDE 1 MG/ML
0.5 INJECTION, SOLUTION INTRAMUSCULAR; INTRAVENOUS; SUBCUTANEOUS
Status: DISCONTINUED | OUTPATIENT
Start: 2023-02-14 | End: 2023-02-14 | Stop reason: HOSPADM

## 2023-02-14 RX ORDER — MAGNESIUM SULFATE HEPTAHYDRATE 500 MG/ML
INJECTION, SOLUTION INTRAMUSCULAR; INTRAVENOUS AS NEEDED
Status: DISCONTINUED | OUTPATIENT
Start: 2023-02-14 | End: 2023-02-14 | Stop reason: SURG

## 2023-02-14 RX ORDER — CEFAZOLIN SODIUM 2 G/100ML
2 INJECTION, SOLUTION INTRAVENOUS EVERY 8 HOURS
Status: DISCONTINUED | OUTPATIENT
Start: 2023-02-14 | End: 2023-02-14 | Stop reason: HOSPADM

## 2023-02-14 RX ORDER — DEXAMETHASONE SODIUM PHOSPHATE 4 MG/ML
INJECTION, SOLUTION INTRA-ARTICULAR; INTRALESIONAL; INTRAMUSCULAR; INTRAVENOUS; SOFT TISSUE AS NEEDED
Status: DISCONTINUED | OUTPATIENT
Start: 2023-02-14 | End: 2023-02-14 | Stop reason: SURG

## 2023-02-14 RX ORDER — NALOXONE HCL 0.4 MG/ML
0.2 VIAL (ML) INJECTION AS NEEDED
Status: DISCONTINUED | OUTPATIENT
Start: 2023-02-14 | End: 2023-02-14 | Stop reason: HOSPADM

## 2023-02-14 RX ORDER — FAMOTIDINE 10 MG/ML
20 INJECTION, SOLUTION INTRAVENOUS ONCE
Status: DISCONTINUED | OUTPATIENT
Start: 2023-02-14 | End: 2023-02-14 | Stop reason: HOSPADM

## 2023-02-14 RX ORDER — PROPOFOL 10 MG/ML
VIAL (ML) INTRAVENOUS AS NEEDED
Status: DISCONTINUED | OUTPATIENT
Start: 2023-02-14 | End: 2023-02-14 | Stop reason: SURG

## 2023-02-14 RX ORDER — ROCURONIUM BROMIDE 10 MG/ML
INJECTION, SOLUTION INTRAVENOUS AS NEEDED
Status: DISCONTINUED | OUTPATIENT
Start: 2023-02-14 | End: 2023-02-14 | Stop reason: SURG

## 2023-02-14 RX ORDER — FLUMAZENIL 0.1 MG/ML
0.2 INJECTION INTRAVENOUS AS NEEDED
Status: DISCONTINUED | OUTPATIENT
Start: 2023-02-14 | End: 2023-02-14 | Stop reason: HOSPADM

## 2023-02-14 RX ORDER — HYDROCODONE BITARTRATE AND ACETAMINOPHEN 7.5; 325 MG/1; MG/1
1 TABLET ORAL ONCE AS NEEDED
Status: DISCONTINUED | OUTPATIENT
Start: 2023-02-14 | End: 2023-02-14 | Stop reason: HOSPADM

## 2023-02-14 RX ORDER — PROMETHAZINE HYDROCHLORIDE 25 MG/1
25 TABLET ORAL ONCE AS NEEDED
Status: DISCONTINUED | OUTPATIENT
Start: 2023-02-14 | End: 2023-02-14 | Stop reason: HOSPADM

## 2023-02-14 RX ORDER — ONDANSETRON 2 MG/ML
INJECTION INTRAMUSCULAR; INTRAVENOUS AS NEEDED
Status: DISCONTINUED | OUTPATIENT
Start: 2023-02-14 | End: 2023-02-14 | Stop reason: SURG

## 2023-02-14 RX ORDER — LIDOCAINE HYDROCHLORIDE 10 MG/ML
0.5 INJECTION, SOLUTION EPIDURAL; INFILTRATION; INTRACAUDAL; PERINEURAL ONCE AS NEEDED
Status: DISCONTINUED | OUTPATIENT
Start: 2023-02-14 | End: 2023-02-14 | Stop reason: HOSPADM

## 2023-02-14 RX ORDER — EPHEDRINE SULFATE 50 MG/ML
5 INJECTION, SOLUTION INTRAVENOUS ONCE AS NEEDED
Status: DISCONTINUED | OUTPATIENT
Start: 2023-02-14 | End: 2023-02-14 | Stop reason: HOSPADM

## 2023-02-14 RX ORDER — NEOSTIGMINE METHYLSULFATE 0.5 MG/ML
INJECTION, SOLUTION INTRAVENOUS AS NEEDED
Status: DISCONTINUED | OUTPATIENT
Start: 2023-02-14 | End: 2023-02-14 | Stop reason: SURG

## 2023-02-14 RX ORDER — PHENYLEPHRINE HCL IN 0.9% NACL 1 MG/10 ML
SYRINGE (ML) INTRAVENOUS AS NEEDED
Status: DISCONTINUED | OUTPATIENT
Start: 2023-02-14 | End: 2023-02-14 | Stop reason: SURG

## 2023-02-14 RX ORDER — SODIUM CHLORIDE 0.9 % (FLUSH) 0.9 %
3 SYRINGE (ML) INJECTION EVERY 12 HOURS SCHEDULED
Status: DISCONTINUED | OUTPATIENT
Start: 2023-02-14 | End: 2023-02-14 | Stop reason: HOSPADM

## 2023-02-14 RX ORDER — LABETALOL HYDROCHLORIDE 5 MG/ML
5 INJECTION, SOLUTION INTRAVENOUS
Status: DISCONTINUED | OUTPATIENT
Start: 2023-02-14 | End: 2023-02-14 | Stop reason: HOSPADM

## 2023-02-14 RX ORDER — PROMETHAZINE HYDROCHLORIDE 25 MG/1
25 SUPPOSITORY RECTAL ONCE AS NEEDED
Status: DISCONTINUED | OUTPATIENT
Start: 2023-02-14 | End: 2023-02-14 | Stop reason: HOSPADM

## 2023-02-14 RX ORDER — FENTANYL CITRATE 50 UG/ML
INJECTION, SOLUTION INTRAMUSCULAR; INTRAVENOUS AS NEEDED
Status: DISCONTINUED | OUTPATIENT
Start: 2023-02-14 | End: 2023-02-14 | Stop reason: SURG

## 2023-02-14 RX ORDER — OXYCODONE AND ACETAMINOPHEN 7.5; 325 MG/1; MG/1
1 TABLET ORAL EVERY 4 HOURS PRN
Status: DISCONTINUED | OUTPATIENT
Start: 2023-02-14 | End: 2023-02-14 | Stop reason: HOSPADM

## 2023-02-14 RX ADMIN — Medication 200 MCG: at 11:16

## 2023-02-14 RX ADMIN — LIDOCAINE HYDROCHLORIDE 100 MG: 20 INJECTION, SOLUTION INTRAVENOUS at 10:58

## 2023-02-14 RX ADMIN — Medication 200 MCG: at 11:20

## 2023-02-14 RX ADMIN — PROPOFOL 150 MG: 10 INJECTION, EMULSION INTRAVENOUS at 10:58

## 2023-02-14 RX ADMIN — Medication 200 MCG: at 11:28

## 2023-02-14 RX ADMIN — Medication 200 MCG: at 11:25

## 2023-02-14 RX ADMIN — FENTANYL CITRATE 50 MCG: 50 INJECTION, SOLUTION INTRAMUSCULAR; INTRAVENOUS at 10:54

## 2023-02-14 RX ADMIN — MAGNESIUM SULFATE HEPTAHYDRATE 2 G: 500 INJECTION, SOLUTION INTRAMUSCULAR; INTRAVENOUS at 10:54

## 2023-02-14 RX ADMIN — SODIUM CHLORIDE, POTASSIUM CHLORIDE, SODIUM LACTATE AND CALCIUM CHLORIDE: 600; 310; 30; 20 INJECTION, SOLUTION INTRAVENOUS at 10:51

## 2023-02-14 RX ADMIN — PHENYLEPHRINE HYDROCHLORIDE 1 MCG/KG/MIN: 10 INJECTION INTRAVENOUS at 11:29

## 2023-02-14 RX ADMIN — HYDROMORPHONE HYDROCHLORIDE 0.5 MG: 1 INJECTION, SOLUTION INTRAMUSCULAR; INTRAVENOUS; SUBCUTANEOUS at 13:25

## 2023-02-14 RX ADMIN — ONDANSETRON 4 MG: 2 INJECTION INTRAMUSCULAR; INTRAVENOUS at 12:21

## 2023-02-14 RX ADMIN — CEFAZOLIN SODIUM 2 G: 2 INJECTION, SOLUTION INTRAVENOUS at 10:46

## 2023-02-14 RX ADMIN — ROCURONIUM BROMIDE 50 MG: 10 INJECTION, SOLUTION INTRAVENOUS at 10:59

## 2023-02-14 RX ADMIN — DEXAMETHASONE SODIUM PHOSPHATE 8 MG: 4 INJECTION, SOLUTION INTRAMUSCULAR; INTRAVENOUS at 11:39

## 2023-02-14 RX ADMIN — FENTANYL CITRATE 50 MCG: 50 INJECTION, SOLUTION INTRAMUSCULAR; INTRAVENOUS at 13:12

## 2023-02-14 RX ADMIN — GLYCOPYRROLATE 0.6 MCG: 1 INJECTION INTRAMUSCULAR; INTRAVENOUS at 12:27

## 2023-02-14 RX ADMIN — NEOSTIGMINE METHYLSULFATE 3 MG: 0.5 INJECTION INTRAVENOUS at 12:27

## 2023-02-14 NOTE — ANESTHESIA PROCEDURE NOTES
Airway  Urgency: elective    Date/Time: 2/14/2023 11:01 AM  Airway not difficult    General Information and Staff    Patient location during procedure: OR  Anesthesiologist: Tyron Mills MD  CRNA/CAA: Savanna Nicholson CRNA    Indications and Patient Condition  Indications for airway management: airway protection    Preoxygenated: yes  Mask difficulty assessment: 1 - vent by mask    Final Airway Details  Final airway type: endotracheal airway      Successful airway: ETT  Cuffed: yes   Successful intubation technique: direct laryngoscopy  Facilitating devices/methods: cricoid pressure and intubating stylet  Endotracheal tube insertion site: oral  Blade: Asnecio  Blade size: 2  ETT size (mm): 7.0  Cormack-Lehane Classification: grade I - full view of glottis  Placement verified by: chest auscultation and capnometry   Cuff volume (mL): 8  Measured from: lips  ETT/EBT  to lips (cm): 21  Number of attempts at approach: 1  Assessment: lips, teeth, and gum same as pre-op    Additional Comments  EBBS: ETCO2+: Atraumatic intubation

## 2023-02-14 NOTE — OP NOTE
Surgeon: Karlo Kay MD    Preoperative diagnosis: Acute L2 and L3  compression fracture with osteoporosis  Postoperative diagnosis Acute L2 and L3 compression fracture with osteoporosis     Procedure: L2 and L3 kyphoplasty with bone biopsy     Indication: To obtain biopsy of acute fracture and augment bone to prevent further deformity and collapse.  Also to treat pain     Description of procedure: The patient was brought into the hybrid OR and placed prone on the OR table.  A time out was performed.  Her back was then cleaned prepped and draped in usual sterile fashion.  She was given preoperative antibiotics.  A hemostat was then used to localize the L2 and L3  pedicles in AP and lateral planes.  1% lidocaine was injected in the subcutaneous tissue and down to the periosteum with a spinal needle.  Next a small stab incision was made on both sides adjacent to the L2 and L3 pedicles.  The pedicles were then cannulated with the pedicle needles under live fluoroscopy in the AP and lateral planes.  A biopsy was then taken from within the vertebral body at each level and sent to pathology.  Small drills were then used to hand drill path to the anterior third of the vertebral body.  The Kyphon balloons were then advanced down the pedicles into the vertebral body and inflated under live fluoroscopy.    The balloons were then removed and 6 mL cement was carefully injected into each cavity under direct fluoroscopy at each level.  The needles were then removed and the skin was closed with 4-0 Monocryl suture.  The wound was then covered with Telfa and Tegaderm.     Complications: None  Estimated blood loss: Less than 10 cc     Anesthesia: General endotracheal anesthesia  Specimen: L2 and L3 bone biopsy

## 2023-02-14 NOTE — ANESTHESIA PREPROCEDURE EVALUATION
Anesthesia Evaluation     history of anesthetic complications: PONV  NPO Solid Status: > 8 hours             Airway   Mallampati: II  TM distance: >3 FB  Neck ROM: full  No difficulty expected  Dental    (+) upper dentures    Pulmonary - normal exam   (+) a smoker, COPD,   Cardiovascular - normal exam    ECG reviewed    (+) hypertension 2 medications or greater, CAD, PVD, hyperlipidemia,  carotid artery disease      Neuro/Psych  (+) psychiatric history,    GI/Hepatic/Renal/Endo    (+)  GERD,      Musculoskeletal     (+) back pain,   Abdominal    Substance History      OB/GYN          Other      history of cancer                  Anesthesia Plan    ASA 3     general     intravenous induction     Anesthetic plan, risks, benefits, and alternatives have been provided, discussed and informed consent has been obtained with: patient.        CODE STATUS:

## 2023-02-14 NOTE — ANESTHESIA POSTPROCEDURE EVALUATION
"Patient: Nelsy Stokes    Procedure Summary     Date: 02/14/23 Room / Location[facility]    Anesthesia Start: 1051 Anesthesia Stop: 1255    Procedure: Lumbar 2 lumbar 3 kyphoplasty (Bilateral: Spine Lumbar) Diagnosis:       Closed compression fracture of L2 lumbar vertebra, sequela      Closed wedge compression fracture of L3 vertebra with delayed healing, subsequent encounter      Pre-op evaluation      (Closed compression fracture of L2 lumbar vertebra, sequela [S32.020S])      (Closed wedge compression fracture of L3 vertebra with delayed healing, subsequent encounter [S32.030G])      (Pre-op evaluation [Z01.818])    Surgeons: Ramon Kay MD Provider: Dimas Sexton MD    Anesthesia Type: general ASA Status: 3          Anesthesia Type: general    Vitals  Vitals Value Taken Time   /77 02/14/23 1431   Temp 36.4 °C (97.5 °F) 02/14/23 1246   Pulse 66 02/14/23 1431   Resp 16 02/14/23 1430   SpO2 93 % 02/14/23 1431   Vitals shown include unvalidated device data.        Post Anesthesia Care and Evaluation    Patient location during evaluation: PACU  Patient participation: complete - patient participated  Level of consciousness: awake and alert  Pain management: adequate    Airway patency: patent  Anesthetic complications: No anesthetic complications  PONV Status: controlled  Cardiovascular status: acceptable and hemodynamically stable  Respiratory status: acceptable  Hydration status: acceptable    Comments: /77   Pulse 63   Temp 36.4 °C (97.5 °F) (Oral)   Resp 16   Ht 170.2 cm (67\")   Wt 75.3 kg (166 lb)   LMP  (LMP Unknown)   SpO2 94%   BMI 26.00 kg/m²       "

## 2023-02-17 ENCOUNTER — TELEPHONE (OUTPATIENT)
Dept: NEUROSURGERY | Facility: CLINIC | Age: 68
End: 2023-02-17
Payer: COMMERCIAL

## 2023-02-17 NOTE — TELEPHONE ENCOUNTER
Patient called stating she had surgery with Dr. Kay a few days ago but is not on any pain medications. She was wondering if she is okay to drive as there were no instructions about this given to her. Please advise and call back at [phone].

## 2023-02-18 LAB
LAB AP CASE REPORT: NORMAL
PATH REPORT.FINAL DX SPEC: NORMAL
PATH REPORT.GROSS SPEC: NORMAL

## 2023-02-20 NOTE — TELEPHONE ENCOUNTER
I let patient know we usually don't let patient drive until their first post op appointment. She voiced understanding.

## 2023-02-21 NOTE — PROGRESS NOTES
Subjective   History of Present Illness: Nelsy Stokes is a 67 y.o. female is here today for follow-up s/p L2 and L3 kyphoplasty 2/14/23.  She is doing well today.  She reports her pain has been significantly reduced since her kyphoplasty.  Denies any new symptoms.  Denies any changes in strength or sensation.  Denies any drainage from her incisions.    History of Present Illness    The following portions of the patient's history were reviewed and updated as appropriate: allergies, past family history, past medical history, past social history, past surgical history and problem list.    Past Medical History:   Diagnosis Date   • Anxiety    • Aortic atherosclerosis (HCC)    • APC (atrial premature contractions) 10/14/2022   • Borderline diabetes    • Carotid artery stenosis     left   • Compression fracture of L2 lumbar vertebra (HCC)    • COPD (chronic obstructive pulmonary disease) (HCC)    • Coronary artery disease    • GERD (gastroesophageal reflux disease)    • History of bradycardia    • History of chest pain     ECHO AND STRESS TEST IN EPIC FROM 2/2020   • History of colon polyps    • History of COVID-19 11/01/2022   • Hyperlipidemia    • Hypertension    • PONV (postoperative nausea and vomiting)    • PVD (peripheral vascular disease) (HCC)    • Subclavian artery stenosis (HCC)     LEFT   • Tobacco abuse         Past Surgical History:   Procedure Laterality Date   • CAROTID STENT Right 2018   • CHEST WALL BIOPSY  09/2022   • CHOLECYSTECTOMY WITH INTRAOPERATIVE CHOLANGIOGRAM N/A 11/18/2020    Procedure: LAPAROSCOPIC CHOLECYSTECTOMY WITH INTRAOPERATIVE CHOLANGIOGRAM, OPEN UMBILICAL HERNIA REPAIR;  Surgeon: Maria D Ariza MD;  Location: [facility] OR;  Service: General;  Laterality: N/A;   • COLONOSCOPY N/A 2017   • ENDOSCOPY N/A 10/09/2020    Procedure: ESOPHAGOGASTRODUODENOSCOPY WITH COLD BIOPSIES;  Surgeon: Jacque Huber MD;  Location: [facility] ENDOSCOPY;  Service: Gastroenterology;  Laterality: N/A;   PRE: REFLUX  POST: ESOPHAGITIS, GASTRITIS, DUODENITIS   • EXTRACORPOREAL SHOCK WAVE LITHOTRIPSY (ESWL)  09/09/2020    SUR[facility]ERY CENTER IN [place]   • KNEE CARTILAGE SURGERY Right 1990s   • KYPHOPLASTY Bilateral 2/14/2023    Procedure: Lumbar 2 lumbar 3 kyphoplasty;  Surgeon: Ramon Kay MD;  Location: [facility] OR 18/19;  Service: Neurosurgery;  Laterality: Bilateral;          Current Outpatient Medications:   •  albuterol sulfate  (90 Base) MCG/ACT inhaler, Inhale 2 puffs Every 4 (Four) Hours As Needed for Wheezing., Disp: 24 g, Rfl: 3  •  aspirin 81 MG EC tablet, Take 81 mg by mouth Daily. TO HOLD 5 DAYS PRIOR TO OR PER MD INSTRUCTIONS, Disp: , Rfl:   •  Chlorhexidine Gluconate Cloth 2 % pads, Apply  topically. PRIOR TO OR, Disp: , Rfl:   •  Cholecalciferol 50 MCG (2000 UT) capsule, One PO daily (Patient taking differently: Take 2,000 Units by mouth 2 (Two) Times a Week.), Disp: 90 each, Rfl: 3  •  Cyanocobalamin (B-12) 1000 MCG lozenge, PLACE ONE TABLET UNDER THE TONGUE DAILY (Patient taking differently: Place  under the tongue Every Other Day.), Disp: 90 lozenge, Rfl: 1  •  cyclobenzaprine (FLEXERIL) 10 MG tablet, Take 0.5 tablets by mouth 3 (Three) Times a Day As Needed for Muscle Spasms., Disp: 30 tablet, Rfl: 1  •  Denosumab (PROLIA SC), Inject  under the skin into the appropriate area as directed Every 6 (Six) Months., Disp: , Rfl:   •  dilTIAZem XR (DILACOR XR) 120 MG 24 hr capsule, TAKE ONE CAPSULE BY MOUTH DAILY (Patient taking differently: Take 120 mg by mouth Daily.), Disp: 90 capsule, Rfl: 0  •  ezetimibe (ZETIA) 10 MG tablet, TAKE ONE TABLET BY MOUTH DAILY FOR CHOLESTEROL, Disp: 90 tablet, Rfl: 1  •  Fluticasone-Umeclidin-Vilant (Trelegy Ellipta) 100-62.5-25 MCG/INH inhaler, Inhale 1 puff Daily., Disp: 60 each, Rfl: 5  •  folic acid (FOLVITE) 1 MG tablet, Take 1 tablet by mouth Daily., Disp: 90 tablet, Rfl: 1  •  metFORMIN ER (GLUCOPHAGE-XR) 500 MG 24 hr tablet, 2 PO daily with  food for impaired fasting glucose (Patient taking differently: Take 1,000 mg by mouth Daily With Breakfast.), Disp: 180 tablet, Rfl: 3  •  pantoprazole (Protonix) 40 MG EC tablet, One PO daily for GERD (Patient taking differently: Take 40 mg by mouth As Needed. One PO daily for GERD), Disp: 90 tablet, Rfl: 1  •  polyethylene glycol (MIRALAX) 17 GM/SCOOP powder, Take 17 g by mouth Daily., Disp: 507 g, Rfl: 0  •  valsartan (DIOVAN) 320 MG tablet, Take 1 tablet by mouth Daily. for HTN, Disp: 90 tablet, Rfl: 1     Allergies   Allergen Reactions   • Pitavastatin Myalgia        Social History     Socioeconomic History   • Marital status:    Tobacco Use   • Smoking status: Every Day     Packs/day: 1.00     Years: 50.00     Pack years: 50.00     Types: Cigarettes   • Smokeless tobacco: Never   • Tobacco comments:     caff use: 3 cups daily.  Began smoking at age 15.  Smoked .75 ppd for 7 years and otherwise 1 ppd for a 48.25 pack year history.   Vaping Use   • Vaping Use: Never used   Substance and Sexual Activity   • Alcohol use: Not Currently   • Drug use: No   • Sexual activity: Yes     Partners: Male     Comment:         Family History   Problem Relation Age of Onset   • Hypertension Father    • Lung disease Father    • Heart attack Father 62   • Anesthesia problems Father    • Heart failure Father 88   • Aneurysm Father         Abdominal Aortic Aneurysm   • COPD Father    • Atrial fibrillation Father    • Gallbladder disease Father    • Lung cancer Father 75   • Prostate cancer Father    • Cancer Sister    • Gallbladder disease Sister    • Heart disease Mother 83   • Hypertension Mother    • Atrial fibrillation Mother    • Gallbladder disease Sister    • Malig Hyperthermia Neg Hx         Review of Systems   Constitutional: Positive for fatigue.   Neurological: Positive for dizziness. Negative for weakness, numbness and headaches.       Objective     Vitals:    02/24/23 1313   BP: 122/72   Pulse: 72    Temp: 96.9 °F (36.1 °C)   SpO2: 98%     There is no height or weight on file to calculate BMI.      Physical Exam  Neurologic Exam  Awake, alert, oriented x3  Pupils equal round reactive to light  Extraocular muscles intact  Face symmetric  Speech is fluent and clear  No pronator drift  Motor exam  Bilateral deltoids 5/5, bilateral biceps 5/5, bilateral triceps 5/5, bilateral wrist extension 5/5 bilateral hand  5/5  Bilateral hip flexion 5/5, bilateral knee extension 5/5, bilateral DF/PF 5/5  No clonus  No Oni's reflex  Steady normal gait  Able to detect  light touch in all 4 extremities        Assessment & Plan     Medical Decision Makin-year-old female s/p L2 and L3 kyphoplasty on 2023  -She is doing well today.  She reports significant improvement after her kyphoplasty.  She reports significant reduction in her pain  -I will plan to see her back in 3 months with repeat x-rays to evaluate for any changes or fractures or new symptoms    Diagnoses and all orders for this visit:    1. Closed wedge compression fracture of L3 vertebra with delayed healing, subsequent encounter (Primary)  -     XR Spine Lumbar Complete With Flex & Ext; Future    2. Closed compression fracture of L2 lumbar vertebra, sequela  -     XR Spine Lumbar Complete With Flex & Ext; Future      Return in about 3 months (around 2023).

## 2023-02-24 ENCOUNTER — OFFICE VISIT (OUTPATIENT)
Dept: NEUROSURGERY | Facility: CLINIC | Age: 68
End: 2023-02-24
Payer: COMMERCIAL

## 2023-02-24 VITALS
HEART RATE: 72 BPM | DIASTOLIC BLOOD PRESSURE: 72 MMHG | SYSTOLIC BLOOD PRESSURE: 122 MMHG | TEMPERATURE: 96.9 F | OXYGEN SATURATION: 98 %

## 2023-02-24 DIAGNOSIS — S32.020S CLOSED COMPRESSION FRACTURE OF L2 LUMBAR VERTEBRA, SEQUELA: ICD-10-CM

## 2023-02-24 DIAGNOSIS — S32.030G CLOSED WEDGE COMPRESSION FRACTURE OF L3 VERTEBRA WITH DELAYED HEALING, SUBSEQUENT ENCOUNTER: Primary | ICD-10-CM

## 2023-02-24 PROCEDURE — 99024 POSTOP FOLLOW-UP VISIT: CPT | Performed by: NEUROLOGICAL SURGERY

## 2023-03-13 ENCOUNTER — HOSPITAL ENCOUNTER (OUTPATIENT)
Dept: CT IMAGING | Facility: HOSPITAL | Age: 68
Discharge: HOME | End: 2023-03-13
Admitting: PHYSICIAN ASSISTANT
Payer: COMMERCIAL

## 2023-03-13 DIAGNOSIS — Z72.0 TOBACCO ABUSE: ICD-10-CM

## 2023-03-13 PROCEDURE — 71271 CT THORAX LUNG CANCER SCR C-: CPT

## 2023-04-04 NOTE — ED PROVIDER NOTES
" EMERGENCY DEPARTMENT ENCOUNTER    Room Number:  39/39  Date of encounter:  11/20/2022  PCP: Gretchen King PA-C  Historian: Patient, daughter      HPI:  Chief Complaint: Low back pain and abdominal pain  A complete HPI/ROS/PMH/PSH/SH/FH are unobtainable due to: None    Context: Nelsy Stokes is a 67 y.o. female who presents to the ED c/o low back and abdominal pain.  Onset Wednesday.  Patient was lifting furniture at her house when she felt a pop in her back.  Pain has been constant and moderate in intensity.  It is a \"hurt\" in character.  Improves with heat and worsens with movement.  The lower abdominal pain is an ongoing since then as well.  She has had no fever, vomiting.  No bowel movement since Wednesday.  Normal urination.  Normal ambulation.      PAST MEDICAL HISTORY  Active Ambulatory Problems     Diagnosis Date Noted   • Asymptomatic microscopic hematuria 07/08/2018   • Subclavian arterial stenosis ([facility]) 07/20/2018   • PAD (peripheral artery disease) ([facility]) 07/20/2018   • Bilateral carotid artery disease ([facility]) 07/20/2018   • Mixed hyperlipidemia 07/20/2018   • Osteopenia of multiple sites 07/20/2018   • Aortic atherosclerosis ([facility]) 10/23/2018   • Left carotid artery stenosis 12/21/2018   • Essential hypertension 01/18/2019   • Gastroesophageal reflux disease 04/29/2019   • Impaired fasting glucose 07/29/2019   • Claudication ([facility]) 09/18/2013   • Tobacco abuse 09/18/2013   • Low folic acid 02/07/2020   • Biliary dyskinesia 10/27/2020   • Calculus of gallbladder with chronic cholecystitis without obstruction 11/18/2020   • Umbilical hernia without obstruction and without gangrene 11/18/2020   • APC (atrial premature contractions) 10/14/2022   • PSVT (paroxysmal supraventricular tachycardia) ([facility])    • Squamous cell skin cancer 10/27/2022     Resolved Ambulatory Problems     Diagnosis Date Noted   • Situational anxiety 01/18/2019   • Dysphoric mood 07/29/2019   • Bradycardia 09/18/2013   • Chest pain " 09/18/2013   • Palpitations 09/18/2013   • Nausea 09/02/2020     Past Medical History:   Diagnosis Date   • Acid reflux    • Anxiety    • Asthma    • Carotid artery disease (HCC)    • Carotid artery stenosis    • COPD (chronic obstructive pulmonary disease) (HCC)    • Coronary artery disease    • Gallbladder disease    • GERD (gastroesophageal reflux disease)    • History of bradycardia    • History of chest pain    • History of colon polyps    • Hyperlipidemia    • Hypertension    • PONV (postoperative nausea and vomiting)    • PVD (peripheral vascular disease) (HCC)    • Subclavian artery stenosis (HCC)    • Umbilical hernia          PAST SURGICAL HISTORY  Past Surgical History:   Procedure Laterality Date   • CAROTID STENT Right 2018   • CHEST WALL BIOPSY  09/2022   • CHOLECYSTECTOMY WITH INTRAOPERATIVE CHOLANGIOGRAM N/A 11/18/2020    Procedure: LAPAROSCOPIC CHOLECYSTECTOMY WITH INTRAOPERATIVE CHOLANGIOGRAM, OPEN UMBILICAL HERNIA REPAIR;  Surgeon: Maria D Ariza MD;  Location: [facility] MAIN OR;  Service: General;  Laterality: N/A;   • COLONOSCOPY N/A 2017   • ENDOSCOPY N/A 10/09/2020    Procedure: ESOPHAGOGASTRODUODENOSCOPY WITH COLD BIOPSIES;  Surgeon: Jacque Huber MD;  Location: [facility] ENDOSCOPY;  Service: Gastroenterology;  Laterality: N/A;  PRE: REFLUX  POST: ESOPHAGITIS, GASTRITIS, DUODENITIS   • EXTRACORPOREAL SHOCK WAVE LITHOTRIPSY (ESWL)  09/09/2020    SURGERY CENTER IN [place]   • KNEE CARTILAGE SURGERY Right 1990s         FAMILY HISTORY  Family History   Problem Relation Age of Onset   • Hypertension Father    • Lung disease Father    • Heart attack Father 62   • Anesthesia problems Father    • Heart failure Father 88   • Aneurysm Father         Abdominal Aortic Aneurysm   • COPD Father    • Atrial fibrillation Father    • Gallbladder disease Father    • Lung cancer Father 75   • Prostate cancer Father    • Cancer Sister    • Gallbladder disease Sister    • Heart disease Mother 83   •  Hypertension Mother    • Atrial fibrillation Mother    • Gallbladder disease Sister    • Malig Hyperthermia Neg Hx          SOCIAL HISTORY  Social History     Socioeconomic History   • Marital status:    Tobacco Use   • Smoking status: Every Day     Packs/day: 1.00     Types: Cigarettes   • Smokeless tobacco: Never   • Tobacco comments:     caff use: 3 cups daily.  Began smoking at age 15.  Smoked .75 ppd for 7 years and otherwise 1 ppd for a 48.25 pack year history.   Vaping Use   • Vaping Use: Never used   Substance and Sexual Activity   • Alcohol use: Not Currently     Comment: SOCIALLY   • Drug use: No   • Sexual activity: Yes     Partners: Male     Comment:          ALLERGIES  Pitavastatin        REVIEW OF SYSTEMS  Review of Systems     All systems reviewed and negative except for those discussed in HPI.       PHYSICAL EXAM    I have reviewed the triage vital signs and nursing notes.    ED Triage Vitals [11/20/22 0912]   Temp Heart Rate Resp BP SpO2   96.7 °F (35.9 °C) 100 18 -- 94 %      Temp src Heart Rate Source Patient Position BP Location FiO2 (%)   Tympanic Monitor -- -- --       Physical Exam  GENERAL: not distressed  HENT: nares patent  EYES: no scleral icterus  CV: regular rhythm, regular rate  RESPIRATORY: normal effort, clear to auscultation bilaterally  ABDOMEN: soft, nontender  RECTAL: External hemorrhoids, normal rectal tone, no fecal impaction  MUSCULOSKELETAL: no deformity  NEURO: alert, moves all extremities, follows commands, normal gait, 5/5 strength to all 4 extremities  SKIN: warm, dry        LAB RESULTS  No results found for this or any previous visit (from the past 24 hour(s)).    Ordered the above labs and independently reviewed the results.        RADIOLOGY  No Radiology Exams Resulted Within Past 24 Hours    I ordered the above noted radiological studies. Reviewed by me and discussed with radiologist.  See dictation for official radiology  interpretation.      PROCEDURES    Procedures      MEDICATIONS GIVEN IN ER    Medications   sodium chloride 0.9 % flush 10 mL (has no administration in time range)   HYDROmorphone (DILAUDID) injection 0.5 mg (has no administration in time range)   ondansetron (ZOFRAN) injection 4 mg (has no administration in time range)         PROGRESS, DATA ANALYSIS, CONSULTS, AND MEDICAL DECISION MAKING    All labs have been independently reviewed by me.  All radiology studies have been reviewed by me and discussed with radiologist dictating the report.   EKG's independently viewed and interpreted by me.  Discussion below represents my analysis of pertinent findings related to patient's condition, differential diagnosis, treatment plan and final disposition.    Patient presents with low back pain and low abdominal pain.    Differential diagnosis includes but not limited to:  - hepatobiliary pathology such as cholecystitis, cholangitis, and symptomatic cholelithiasis  - Pancreatitis  - Dyspepsia  - Small bowel obstruction  - Appendicitis  - Diverticulitis  - UTI including pyelonephritis  - Ureteral stone  - Zoster  - Colitis, including infectious and ischemic  - AAA complication  - sciatica  - cauda equina syndrome  - spinal cord compression    ED Course as of 11/20/22 2124   Sun Nov 20, 2022   1310 CT angiogram shows no evidence of AAA.  Multifocal arterial narrowing.  L2 compression fracture that is age-indeterminate. [TD]   1355 WBC: 8.07 [TD]   1355 Hemoglobin: 13.5 [TD]   1355 Creatinine: 0.87 [TD]   1355 Sodium(!): 135 [TD]      ED Course User Index  [TD] Richard King II, MD       Urinalysis is a contaminated specimen given the number of squamous epithelial cells.  I am currently monitoring the patient's urine culture.  There is no growth in final report.      PPE: The patient wore a surgical mask throughout the entire patient encounter. I wore an N95.    AS OF 09:50 EST VITALS:    BP -    HR - 100  TEMP - 96.7 °F  (35.9 °C) (Tympanic)  O2 SATS - 94%        DIAGNOSIS  Final diagnoses:   Strain of lumbar region, initial encounter   Lower abdominal pain         DISPOSITION  DISCHARGE    FOLLOW-UP  [facility]  [address]  [facility] [address] [phone]  Go to   If symptoms worsen    Gretchen King PA-C  [address] [facility] [address]  [place] [address]  [phone]    Schedule an appointment as soon as possible for a visit in 2 days           Medication List      New Prescriptions    cyclobenzaprine 5 MG tablet  Commonly known as: FLEXERIL  Take 1 tablet by mouth 3 (Three) Times a Day As Needed for Muscle Spasms for up to 5 days.           Where to Get Your Medications      These medications were sent to [id number] - [place], [place] - [address] AT [facility] - [phone]  - [phone]   [address] [facility], [place] [id number]    Phone: [phone]   · cyclobenzaprine 5 MG tablet                  Richard King II, MD  11/23/22 6740     Topical Ketoconazole Counseling: Patient counseled that this medication may cause skin irritation or allergic reactions.  In the event of skin irritation, the patient was advised to reduce the amount of the drug applied or use it less frequently.   The patient verbalized understanding of the proper use and possible adverse effects of ketoconazole.  All of the patient's questions and concerns were addressed.

## 2023-04-14 ENCOUNTER — TELEPHONE (OUTPATIENT)
Dept: CARDIOLOGY | Facility: CLINIC | Age: 68
End: 2023-04-14
Payer: COMMERCIAL

## 2023-04-14 RX ORDER — DILTIAZEM HYDROCHLORIDE 120 MG/1
120 CAPSULE, EXTENDED RELEASE ORAL DAILY
Qty: 90 CAPSULE | Refills: 0 | Status: SHIPPED | OUTPATIENT
Start: 2023-04-14

## 2023-04-14 NOTE — TELEPHONE ENCOUNTER
Pharmacy is calling for refills on her medications.  She needs a follow-up visit with Dr. Bueno or myself.  Please arrange.  Thank you

## 2023-05-03 ENCOUNTER — OFFICE VISIT (OUTPATIENT)
Dept: CARDIOLOGY | Facility: CLINIC | Age: 68
End: 2023-05-03
Payer: COMMERCIAL

## 2023-05-03 VITALS
SYSTOLIC BLOOD PRESSURE: 130 MMHG | BODY MASS INDEX: 26.21 KG/M2 | HEART RATE: 68 BPM | HEIGHT: 67 IN | WEIGHT: 167 LBS | DIASTOLIC BLOOD PRESSURE: 78 MMHG

## 2023-05-03 DIAGNOSIS — Z72.0 TOBACCO ABUSE: Chronic | ICD-10-CM

## 2023-05-03 DIAGNOSIS — R07.2 PRECORDIAL PAIN: ICD-10-CM

## 2023-05-03 DIAGNOSIS — E78.2 MIXED HYPERLIPIDEMIA: ICD-10-CM

## 2023-05-03 DIAGNOSIS — I47.1 PSVT (PAROXYSMAL SUPRAVENTRICULAR TACHYCARDIA): ICD-10-CM

## 2023-05-03 DIAGNOSIS — I73.9 PAD (PERIPHERAL ARTERY DISEASE): Chronic | ICD-10-CM

## 2023-05-03 DIAGNOSIS — I49.1 APC (ATRIAL PREMATURE CONTRACTIONS): Primary | ICD-10-CM

## 2023-05-03 DIAGNOSIS — I10 ESSENTIAL HYPERTENSION: ICD-10-CM

## 2023-05-03 PROBLEM — Z01.818 PRE-OP EVALUATION: Status: RESOLVED | Noted: 2023-01-31 | Resolved: 2023-05-03

## 2023-05-03 PROCEDURE — 93000 ELECTROCARDIOGRAM COMPLETE: CPT | Performed by: NURSE PRACTITIONER

## 2023-05-03 PROCEDURE — 99214 OFFICE O/P EST MOD 30 MIN: CPT | Performed by: NURSE PRACTITIONER

## 2023-05-03 RX ORDER — DILTIAZEM HYDROCHLORIDE 120 MG/1
120 CAPSULE, EXTENDED RELEASE ORAL DAILY
Qty: 90 CAPSULE | Refills: 3 | Status: SHIPPED | OUTPATIENT
Start: 2023-05-03

## 2023-05-03 NOTE — PROGRESS NOTES
Date of Office Visit: 2023  Encounter Provider: BENITO Douglas  Place of Service: [facility] CARDIOLOGY  Patient Name: Nelsy Stokes  :1955  Primary Cardiologist: Dr. Julito Bueno    Chief Complaint   Patient presents with   • Follow-up   • Palpitations   :     HPI: Nelsy Stokes is a 68 y.o. female who presents today for 6-month cardiac follow-up visit.  I reviewed her medical records.    She has been diagnosed with peripheral arterial disease (carotid artery stenosis, abdominal atherosclerotic disease, and left subclavian artery stenosis), hyperlipidemia, and is a current cigarette smoker.      In 2018, she was having chest pain and had a normal treadmill stress test.     In 2020, she was experiencing chest pain and shortness of breath.  Nuclear stress test showed a very small amount of mild ischemia in the distal inferior wall and felt to be a low risk and borderline stress study.  Echocardiogram showed EF 62%, aortic valve sclerosis without stenosis, trace MR, and trace to mild TR.    In 2022, she was noted to have frequent atrial premature contractions on EKG.  Holter monitor showed normal sinus rhythm, average heart rate 79 bpm, frequent episodes of SVT 3-11 beats in duration and highest rate 160 bpm) occasional APCs and rare PVCs.  I reassured her that we did not see heart rates in the 40s.  She was experiencing palpitations that started diltiazem 120 mg daily.  She had some atypical chest pain and I ordered an echocardiogram and stress test to be completed.  She then developed COVID so she postponed the testing and never had it completed.  Her blood pressure and heart rates were running high so diltiazem was increased to 240 mg daily.  She had a follow-up appointment with me at the end of November and she canceled the appointment.    She presents today for follow-up visit.  In 2022, she was lifting an entertainment  Hydro and developed a fracture in her back.  In February 2023, she underwent back surgery by Dr. Kay.  She continues to experience some lower back pain.  She is taking diltiazem 120 mg daily because when she took the 240 mg daily it made her feel poorly.  She denies any further palpitations.  She reports some mild dyspnea and cough.  She recently saw Dr. Broderick Bosch her pulmonologist who said that she had mild emphysema and to continue with Trelegy.  She denies further chest pain, palpitations, edema, dizziness, syncope, or bleeding.  Blood pressure and heart rate are normal.  She continues to smoke cigarettes.      Past Medical History:   Diagnosis Date   • Anxiety    • Aortic atherosclerosis    • APC (atrial premature contractions) 10/14/2022   • Borderline diabetes    • Carotid artery stenosis     left   • Compression fracture of L2 lumbar vertebra    • COPD (chronic obstructive pulmonary disease)    • Coronary artery disease    • GERD (gastroesophageal reflux disease)    • History of bradycardia    • History of chest pain     ECHO AND STRESS TEST IN EPIC FROM 2/2020   • History of colon polyps    • History of COVID-19 11/01/2022   • Hyperlipidemia    • Hypertension    • PONV (postoperative nausea and vomiting)    • PVD (peripheral vascular disease)    • Subclavian artery stenosis     LEFT   • Tobacco abuse        Past Surgical History:   Procedure Laterality Date   • CAROTID STENT Right 2018   • CHEST WALL BIOPSY  09/2022   • CHOLECYSTECTOMY WITH INTRAOPERATIVE CHOLANGIOGRAM N/A 11/18/2020    Procedure: LAPAROSCOPIC CHOLECYSTECTOMY WITH INTRAOPERATIVE CHOLANGIOGRAM, OPEN UMBILICAL HERNIA REPAIR;  Surgeon: Maria D Ariza MD;  Location: [facility] OR;  Service: General;  Laterality: N/A;   • COLONOSCOPY N/A 2017   • ENDOSCOPY N/A 10/09/2020    Procedure: ESOPHAGOGASTRODUODENOSCOPY WITH COLD BIOPSIES;  Surgeon: Jacque Huber MD;  Location: [facility] ENDOSCOPY;  Service: Gastroenterology;  Laterality:  N/A;  PRE: REFLUX  POST: ESOPHAGITIS, GASTRITIS, DUODENITIS   • EXTRACORPOREAL SHOCK WAVE LITHOTRIPSY (ESWL)  09/09/2020    SURGERY [facility] [place]   • KNEE CARTILAGE SURGERY Right 1990s   • KYPHOPLASTY Bilateral 2/14/2023    Procedure: Lumbar 2 lumbar 3 kyphoplasty;  Surgeon: Ramon Kay MD;  Location: [facility] 18/19;  Service: Neurosurgery;  Laterality: Bilateral;       Social History     Socioeconomic History   • Marital status:    Tobacco Use   • Smoking status: Every Day     Packs/day: 1.00     Years: 50.00     Pack years: 50.00     Types: Cigarettes   • Smokeless tobacco: Never   • Tobacco comments:     caff use: 3 cups daily.  Began smoking at age 15.  Smoked .75 ppd for 7 years and otherwise 1 ppd for a 48.25 pack year history.   Vaping Use   • Vaping Use: Never used   Substance and Sexual Activity   • Alcohol use: Not Currently   • Drug use: No   • Sexual activity: Yes     Partners: Male     Comment:        Family History   Problem Relation Age of Onset   • Hypertension Father    • Lung disease Father    • Heart attack Father 62   • Anesthesia problems Father    • Heart failure Father 88   • Aneurysm Father         Abdominal Aortic Aneurysm   • COPD Father    • Atrial fibrillation Father    • Gallbladder disease Father    • Lung cancer Father 75   • Prostate cancer Father    • Cancer Sister    • Gallbladder disease Sister    • Heart disease Mother 83   • Hypertension Mother    • Atrial fibrillation Mother    • Gallbladder disease Sister    • Malig Hyperthermia Neg Hx        The following portion of the patient's history were reviewed and updated as appropriate: past medical history, past surgical history, past social history, past family history, allergies, current medications, and problem list.    Review of Systems   Constitutional: Negative.   Cardiovascular: Positive for dyspnea on exertion.   Respiratory: Positive for cough and shortness of breath.     Hematologic/Lymphatic: Negative.    Neurological: Negative.        Allergies   Allergen Reactions   • Pitavastatin Myalgia         Current Outpatient Medications:   •  albuterol sulfate  (90 Base) MCG/ACT inhaler, Inhale 2 puffs Every 4 (Four) Hours As Needed for Wheezing., Disp: 24 g, Rfl: 3  •  aspirin 81 MG EC tablet, Take 1 tablet by mouth Daily. TO HOLD 5 DAYS PRIOR TO OR PER MD INSTRUCTIONS, Disp: , Rfl:   •  Cholecalciferol 50 MCG (2000 UT) capsule, One PO daily (Patient taking differently: Take 1 capsule by mouth 2 (Two) Times a Week.), Disp: 90 each, Rfl: 3  •  Cyanocobalamin (B-12) 1000 MCG lozenge, PLACE ONE TABLET UNDER THE TONGUE DAILY (Patient taking differently: Place  under the tongue Every Other Day.), Disp: 90 lozenge, Rfl: 1  •  Denosumab (PROLIA SC), Inject  under the skin into the appropriate area as directed Every 6 (Six) Months., Disp: , Rfl:   •  dilTIAZem XR (DILACOR XR) 120 MG 24 hr capsule, Take 1 capsule by mouth Daily., Disp: 90 capsule, Rfl: 3  •  ezetimibe (ZETIA) 10 MG tablet, TAKE ONE TABLET BY MOUTH DAILY FOR CHOLESTEROL, Disp: 90 tablet, Rfl: 1  •  Fluticasone-Umeclidin-Vilant (Trelegy Ellipta) 100-62.5-25 MCG/INH inhaler, Inhale 1 puff Daily., Disp: 60 each, Rfl: 5  •  folic acid (FOLVITE) 1 MG tablet, Take 1 tablet by mouth Daily., Disp: 90 tablet, Rfl: 1  •  metFORMIN ER (GLUCOPHAGE-XR) 500 MG 24 hr tablet, 2 PO daily with food for impaired fasting glucose (Patient taking differently: Take 2 tablets by mouth Daily With Breakfast.), Disp: 180 tablet, Rfl: 3  •  pantoprazole (Protonix) 40 MG EC tablet, One PO daily for GERD (Patient taking differently: Take 1 tablet by mouth As Needed. One PO daily for GERD), Disp: 90 tablet, Rfl: 1  •  polyethylene glycol (MIRALAX) 17 GM/SCOOP powder, Take 17 g by mouth Daily., Disp: 507 g, Rfl: 0  •  valsartan (DIOVAN) 320 MG tablet, Take 1 tablet by mouth Daily. for HTN, Disp: 90 tablet, Rfl: 1         Objective:     Vitals:     "05/03/23 1109   BP: 130/78   BP Location: Right arm   Patient Position: Sitting   Cuff Size: Adult   Pulse: 68   Weight: 75.8 kg (167 lb)   Height: 170.2 cm (67.01\")     Body mass index is 26.15 kg/m².    PHYSICAL EXAM:    Vitals Reviewed.   General Appearance: No acute distress, well developed and well nourished.    HENT: No hearing loss noted.    Respiratory: No signs of respiratory distress. Respiration rhythm and depth normal.  Rhonchi noted in right upper lung.  Otherwise coarse lung sounds noted.  Cardiovascular:  Jugular Venous Pressure: Normal  Heart Rate and Rhythm: Normal, Heart Sounds: Normal S1 and S2. No S3 or S4 noted.  Murmurs: No murmurs noted. No rubs, thrills, or gallops.   Lower Extremities: No edema noted.  Musculoskeletal: Normal movement of extremities.  Skin: General appearance normal.    Psychiatric: Patient alert and oriented to person, place, and time. Speech and behavior appropriate. Normal mood and affect.       ECG 12 Lead    Date/Time: 5/3/2023 11:05 AM  Performed by: Diane Bryant APRN  Authorized by: Diane Bryant APRN   Comparison: compared with previous ECG from 2/9/2023  Similar to previous ECG  Rhythm: sinus rhythm  Ectopy: atrial premature contractions  Rate: normal  BPM: 68  Conduction: conduction normal  ST Segments: ST segments normal  T Waves: T waves normal  QRS axis: normal    Clinical impression: non-specific ECG              Assessment:       Diagnosis Plan   1. APC (atrial premature contractions)  Adult Transthoracic Echo Complete w/ Color, Spectral and Contrast if Necessary Per Protocol      2. PSVT (paroxysmal supraventricular tachycardia)  Adult Transthoracic Echo Complete w/ Color, Spectral and Contrast if Necessary Per Protocol      3. Precordial pain        4. PAD (peripheral artery disease)        5. Essential hypertension        6. Mixed hyperlipidemia        7. Tobacco abuse               Plan:       1/2.  APCs and PSVT noted on Holter monitor in 2022.  " She was tried on diltiazem 240 mg daily and this made her feel poorly.  She is currently taking diltiazem 120 mg daily and her palpitations have resolved.  Single APC noted on today's EKG.  We will still have the echocardiogram completed.    3.  Chest Pain: Resolved.  Cancel stress test.  She certainly is at risk of coronary artery disease with her PAD and other risk factors (hypertension, hyperlipidemia, and cigarette smoking).  If she has recurrent chest pain, order stress test.    4.  Peripheral Arterial Disease: Followed by Dr. Zac Leong. Known subclavian stenosis, carotid stenosis, PAD of lower extremities.      5.  Hypertension: Blood pressure well controlled today.    6.  Hyperlipidemia: Goal LDL less than 70 and followed by her PCP.  Continue ezetimibe.    7.  She would highly benefit from standing from cigarette smoking.    8.  I will follow-up with her about the echocardiogram results.  Follow-up with Dr. Bueno in 1 year.    As always, it has been a pleasure to participate in your patient's care. Thank you.         Sincerely,         BENITO Salguero  [facility] Cardiology      · Dictated utilizing Dragon Dictation  · COVID-19 Precautions - Patient was compliant in wearing a mask. When I saw the patient, I used appropriate personal protective equipment (PPE) including mask and eye shield (standard procedure).  Additionally, I used gown and gloves if indicated.  Hand hygiene was completed before and after seeing the patient.  · I spent 30 minutes reviewing her medical records/testing/previous office notes/labs, face-to-face interaction with patient, physical examination, formulating the plan of care, and discussion of plan of care with patient.

## 2023-05-17 ENCOUNTER — HOSPITAL ENCOUNTER (OUTPATIENT)
Dept: CARDIOLOGY | Facility: HOSPITAL | Age: 68
Discharge: HOME | End: 2023-05-17
Payer: COMMERCIAL

## 2023-05-17 ENCOUNTER — HOSPITAL ENCOUNTER (OUTPATIENT)
Dept: GENERAL RADIOLOGY | Facility: HOSPITAL | Age: 68
Discharge: HOME | End: 2023-05-17
Payer: COMMERCIAL

## 2023-05-17 VITALS
SYSTOLIC BLOOD PRESSURE: 160 MMHG | WEIGHT: 167 LBS | OXYGEN SATURATION: 96 % | HEIGHT: 67 IN | DIASTOLIC BLOOD PRESSURE: 83 MMHG | HEART RATE: 68 BPM | BODY MASS INDEX: 26.21 KG/M2

## 2023-05-17 DIAGNOSIS — I49.1 APC (ATRIAL PREMATURE CONTRACTIONS): ICD-10-CM

## 2023-05-17 DIAGNOSIS — S32.020S CLOSED COMPRESSION FRACTURE OF L2 LUMBAR VERTEBRA, SEQUELA: ICD-10-CM

## 2023-05-17 DIAGNOSIS — I47.1 PSVT (PAROXYSMAL SUPRAVENTRICULAR TACHYCARDIA): ICD-10-CM

## 2023-05-17 DIAGNOSIS — S32.030G CLOSED WEDGE COMPRESSION FRACTURE OF L3 VERTEBRA WITH DELAYED HEALING, SUBSEQUENT ENCOUNTER: ICD-10-CM

## 2023-05-17 LAB
AORTIC ARCH: 2.5 CM
AORTIC DIMENSIONLESS INDEX: 0.7 (DI)
ASCENDING AORTA: 2.9 CM
BH CV ECHO MEAS - ACS: 1.76 CM
BH CV ECHO MEAS - AO MAX PG: 5 MMHG
BH CV ECHO MEAS - AO MEAN PG: 2.7 MMHG
BH CV ECHO MEAS - AO ROOT DIAM: 3 CM
BH CV ECHO MEAS - AO V2 MAX: 111.3 CM/SEC
BH CV ECHO MEAS - AO V2 VTI: 26.9 CM
BH CV ECHO MEAS - AVA(I,D): 2.23 CM2
BH CV ECHO MEAS - EDV(CUBED): 98.3 ML
BH CV ECHO MEAS - EDV(MOD-SP2): 87 ML
BH CV ECHO MEAS - EDV(MOD-SP4): 81 ML
BH CV ECHO MEAS - EF(MOD-BP): 65 %
BH CV ECHO MEAS - EF(MOD-SP2): 67.8 %
BH CV ECHO MEAS - EF(MOD-SP4): 64.2 %
BH CV ECHO MEAS - ESV(CUBED): 27.1 ML
BH CV ECHO MEAS - ESV(MOD-SP2): 28 ML
BH CV ECHO MEAS - ESV(MOD-SP4): 29 ML
BH CV ECHO MEAS - FS: 34.9 %
BH CV ECHO MEAS - IVS/LVPW: 1.26 CM
BH CV ECHO MEAS - IVSD: 0.96 CM
BH CV ECHO MEAS - LAT PEAK E' VEL: 9.2 CM/SEC
BH CV ECHO MEAS - LV DIASTOLIC VOL/BSA (35-75): 43.2 CM2
BH CV ECHO MEAS - LV MASS(C)D: 131.4 GRAMS
BH CV ECHO MEAS - LV MAX PG: 2.9 MMHG
BH CV ECHO MEAS - LV MEAN PG: 1.63 MMHG
BH CV ECHO MEAS - LV SYSTOLIC VOL/BSA (12-30): 15.5 CM2
BH CV ECHO MEAS - LV V1 MAX: 85.2 CM/SEC
BH CV ECHO MEAS - LV V1 VTI: 19.9 CM
BH CV ECHO MEAS - LVIDD: 4.6 CM
BH CV ECHO MEAS - LVIDS: 3 CM
BH CV ECHO MEAS - LVOT AREA: 3 CM2
BH CV ECHO MEAS - LVOT DIAM: 1.96 CM
BH CV ECHO MEAS - LVPWD: 0.77 CM
BH CV ECHO MEAS - MED PEAK E' VEL: 7.6 CM/SEC
BH CV ECHO MEAS - MR MAX PG: 45.7 MMHG
BH CV ECHO MEAS - MR MAX VEL: 338 CM/SEC
BH CV ECHO MEAS - MV A DUR: 0.1 SEC
BH CV ECHO MEAS - MV A MAX VEL: 94.8 CM/SEC
BH CV ECHO MEAS - MV DEC SLOPE: 612.1 CM/SEC2
BH CV ECHO MEAS - MV DEC TIME: 204 MSEC
BH CV ECHO MEAS - MV E MAX VEL: 107 CM/SEC
BH CV ECHO MEAS - MV E/A: 1.13
BH CV ECHO MEAS - MV MAX PG: 6.2 MMHG
BH CV ECHO MEAS - MV MEAN PG: 2.28 MMHG
BH CV ECHO MEAS - MV P1/2T: 60.4 MSEC
BH CV ECHO MEAS - MV V2 VTI: 36 CM
BH CV ECHO MEAS - MVA(P1/2T): 3.6 CM2
BH CV ECHO MEAS - MVA(VTI): 1.67 CM2
BH CV ECHO MEAS - PA ACC TIME: 0.08 SEC
BH CV ECHO MEAS - PA PR(ACCEL): 43.3 MMHG
BH CV ECHO MEAS - PA V2 MAX: 77.4 CM/SEC
BH CV ECHO MEAS - PULM A REVS DUR: 0.12 SEC
BH CV ECHO MEAS - PULM A REVS VEL: 32 CM/SEC
BH CV ECHO MEAS - PULM DIAS VEL: 57.7 CM/SEC
BH CV ECHO MEAS - PULM S/D: 0.88
BH CV ECHO MEAS - PULM SYS VEL: 50.9 CM/SEC
BH CV ECHO MEAS - QP/QS: 0.66
BH CV ECHO MEAS - RAP SYSTOLE: 3 MMHG
BH CV ECHO MEAS - RV MAX PG: 1.53 MMHG
BH CV ECHO MEAS - RV V1 MAX: 61.7 CM/SEC
BH CV ECHO MEAS - RV V1 VTI: 14.4 CM
BH CV ECHO MEAS - RVOT DIAM: 1.87 CM
BH CV ECHO MEAS - RVSP: 32.2 MMHG
BH CV ECHO MEAS - SI(MOD-SP2): 31.5 ML/M2
BH CV ECHO MEAS - SI(MOD-SP4): 27.8 ML/M2
BH CV ECHO MEAS - SUP REN AO DIAM: 1.6 CM
BH CV ECHO MEAS - SV(LVOT): 60.1 ML
BH CV ECHO MEAS - SV(MOD-SP2): 59 ML
BH CV ECHO MEAS - SV(MOD-SP4): 52 ML
BH CV ECHO MEAS - SV(RVOT): 39.6 ML
BH CV ECHO MEAS - TAPSE (>1.6): 1.95 CM
BH CV ECHO MEAS - TR MAX PG: 29.2 MMHG
BH CV ECHO MEAS - TR MAX VEL: 270.1 CM/SEC
BH CV ECHO MEASUREMENTS AVERAGE E/E' RATIO: 12.74
BH CV XLRA - RV BASE: 2.8 CM
BH CV XLRA - RV LENGTH: 6.1 CM
BH CV XLRA - RV MID: 1.82 CM
BH CV XLRA - TDI S': 13.1 CM/SEC
LEFT ATRIUM VOLUME INDEX: 15.1 ML/M2
MAXIMAL PREDICTED HEART RATE: 152 BPM
SINUS: 2.8 CM
STJ: 2.5 CM
STRESS TARGET HR: 129 BPM

## 2023-05-17 PROCEDURE — 93306 TTE W/DOPPLER COMPLETE: CPT

## 2023-05-17 PROCEDURE — 93306 TTE W/DOPPLER COMPLETE: CPT | Performed by: INTERNAL MEDICINE

## 2023-05-17 PROCEDURE — 72114 X-RAY EXAM L-S SPINE BENDING: CPT

## 2023-05-17 PROCEDURE — 25510000001 PERFLUTREN (DEFINITY) 8.476 MG IN SODIUM CHLORIDE (PF) 0.9 % 10 ML INJECTION: Performed by: NURSE PRACTITIONER

## 2023-05-17 RX ADMIN — PERFLUTREN 2 ML: 6.52 INJECTION, SUSPENSION INTRAVENOUS at 08:32

## 2023-05-25 NOTE — PROGRESS NOTES
Subjective   History of Present Illness: Nelsy Stokes is a 68 y.o. female is here today for follow-up. She is 3 months out from an L2 and L3 Kyphoplasty. Lumbar XR completed on 5/17/23.  She reports that over the past month she has had severe lower back pain that radiates into her posterior thigh.  She denies any changes in strength or sensation.  She reports that the pain is worse with activity and improves with rest.    History of Present Illness    The following portions of the patient's history were reviewed and updated as appropriate: allergies, past family history, past medical history, past social history, past surgical history and problem list.    Past Medical History:   Diagnosis Date   • Anxiety    • Aortic atherosclerosis    • APC (atrial premature contractions) 10/14/2022   • Borderline diabetes    • Carotid artery stenosis     left   • Compression fracture of L2 lumbar vertebra    • COPD (chronic obstructive pulmonary disease)    • Coronary artery disease    • GERD (gastroesophageal reflux disease)    • History of bradycardia    • History of chest pain     ECHO AND STRESS TEST IN EPIC FROM 2/2020   • History of colon polyps    • History of COVID-19 11/01/2022   • Hyperlipidemia    • Hypertension    • PONV (postoperative nausea and vomiting)    • PVD (peripheral vascular disease)    • Subclavian artery stenosis     LEFT   • Tobacco abuse         Past Surgical History:   Procedure Laterality Date   • CAROTID STENT Right 2018   • CHEST WALL BIOPSY  09/2022   • CHOLECYSTECTOMY WITH INTRAOPERATIVE CHOLANGIOGRAM N/A 11/18/2020    Procedure: LAPAROSCOPIC CHOLECYSTECTOMY WITH INTRAOPERATIVE CHOLANGIOGRAM, OPEN UMBILICAL HERNIA REPAIR;  Surgeon: Maria D Ariza MD;  Location: [facility] OR;  Service: General;  Laterality: N/A;   • COLONOSCOPY N/A 2017   • ENDOSCOPY N/A 10/09/2020    Procedure: ESOPHAGOGASTRODUODENOSCOPY WITH COLD BIOPSIES;  Surgeon: Jacque Huber MD;  Location: [facility] ENDOSCOPY;   Service: Gastroenterology;  Laterality: N/A;  PRE: REFLUX  POST: ESOPHAGITIS, GASTRITIS, DUODENITIS   • EXTRACORPOREAL SHOCK WAVE LITHOTRIPSY (ESWL)  09/09/2020    SURGERY CENTER IN [place]   • KNEE CARTILAGE SURGERY Right 1990s   • KYPHOPLASTY Bilateral 2/14/2023    Procedure: Lumbar 2 lumbar 3 kyphoplasty;  Surgeon: Ramon Kay MD;  Location: [facility] 18/19;  Service: Neurosurgery;  Laterality: Bilateral;          Current Outpatient Medications:   •  albuterol sulfate  (90 Base) MCG/ACT inhaler, Inhale 2 puffs Every 4 (Four) Hours As Needed for Wheezing., Disp: 24 g, Rfl: 3  •  aspirin 81 MG EC tablet, Take 1 tablet by mouth Daily. TO HOLD 5 DAYS PRIOR TO OR PER MD INSTRUCTIONS, Disp: , Rfl:   •  Cholecalciferol 50 MCG (2000 UT) capsule, One PO daily (Patient taking differently: Take 1 capsule by mouth 2 (Two) Times a Week.), Disp: 90 each, Rfl: 3  •  Cyanocobalamin (B-12) 1000 MCG lozenge, PLACE ONE TABLET UNDER THE TONGUE DAILY (Patient taking differently: Place  under the tongue Every Other Day.), Disp: 90 lozenge, Rfl: 1  •  Denosumab (PROLIA SC), Inject  under the skin into the appropriate area as directed Every 6 (Six) Months., Disp: , Rfl:   •  dilTIAZem XR (DILACOR XR) 120 MG 24 hr capsule, Take 1 capsule by mouth Daily., Disp: 90 capsule, Rfl: 3  •  ezetimibe (ZETIA) 10 MG tablet, TAKE ONE TABLET BY MOUTH DAILY FOR CHOLESTEROL, Disp: 90 tablet, Rfl: 1  •  Fluticasone-Umeclidin-Vilant (Trelegy Ellipta) 100-62.5-25 MCG/INH inhaler, Inhale 1 puff Daily., Disp: 60 each, Rfl: 5  •  folic acid (FOLVITE) 1 MG tablet, Take 1 tablet by mouth Daily., Disp: 90 tablet, Rfl: 1  •  metFORMIN ER (GLUCOPHAGE-XR) 500 MG 24 hr tablet, 2 PO daily with food for impaired fasting glucose (Patient taking differently: Take 2 tablets by mouth Daily With Breakfast.), Disp: 180 tablet, Rfl: 3  •  pantoprazole (Protonix) 40 MG EC tablet, One PO daily for GERD (Patient taking differently: Take 1 tablet by  mouth As Needed. One PO daily for GERD), Disp: 90 tablet, Rfl: 1  •  polyethylene glycol (MIRALAX) 17 GM/SCOOP powder, Take 17 g by mouth Daily., Disp: 507 g, Rfl: 0  •  valsartan (DIOVAN) 320 MG tablet, Take 1 tablet by mouth Daily. for HTN, Disp: 90 tablet, Rfl: 1  •  gabapentin (NEURONTIN) 300 MG capsule, Take 1 capsule by mouth 3 (Three) Times a Day., Disp: 90 capsule, Rfl: 1  •  methylPREDNISolone (MEDROL) 4 MG dose pack, Take as directed on package instructions., Disp: 21 tablet, Rfl: 0  •  Pitavastatin Calcium (Livalo) 4 MG tablet, , Disp: , Rfl:      Allergies   Allergen Reactions   • Pitavastatin Myalgia        Social History     Socioeconomic History   • Marital status:    Tobacco Use   • Smoking status: Every Day     Packs/day: 1.00     Years: 50.00     Pack years: 50.00     Types: Cigarettes   • Smokeless tobacco: Never   • Tobacco comments:     caff use: 3 cups daily.  Began smoking at age 15.  Smoked .75 ppd for 7 years and otherwise 1 ppd for a 48.25 pack year history.   Vaping Use   • Vaping Use: Never used   Substance and Sexual Activity   • Alcohol use: Not Currently   • Drug use: No   • Sexual activity: Yes     Partners: Male     Comment:         Family History   Problem Relation Age of Onset   • Hypertension Father    • Lung disease Father    • Heart attack Father 62   • Anesthesia problems Father    • Heart failure Father 88   • Aneurysm Father         Abdominal Aortic Aneurysm   • COPD Father    • Atrial fibrillation Father    • Gallbladder disease Father    • Lung cancer Father 75   • Prostate cancer Father    • Cancer Sister    • Gallbladder disease Sister    • Heart disease Mother 83   • Hypertension Mother    • Atrial fibrillation Mother    • Gallbladder disease Sister    • Malig Hyperthermia Neg Hx         Review of Systems   Genitourinary: Negative for difficulty urinating and urgency.   Musculoskeletal: Positive for back pain (Right thigh ) and gait problem. Negative for  "neck pain and neck stiffness.   Neurological: Negative for weakness and numbness.       Objective     Vitals:    23 1039   BP: 140/80   Temp: 96.9 °F (36.1 °C)   Weight: 76.7 kg (169 lb 3.2 oz)   Height: 170.2 cm (67\")     Body mass index is 26.5 kg/m².      Physical Exam  Neurologic Exam  Awake, alert, oriented x3  Pupils equal round reactive to light  Extraocular muscles intact  Face symmetric  Speech is fluent and clear  No pronator drift  Motor exam  Bilateral deltoids 5/5, bilateral biceps 5/5, bilateral triceps 5/5, bilateral wrist extension 5/5 bilateral hand  5/5  Bilateral hip flexion 5/5, bilateral knee extension 5/5, bilateral DF/PF 5/5  No clonus  No Oni's reflex  Steady normal gait  Able to detect  light touch in all 4 extremities        Assessment & Plan   Independent Review of Radiographic Studies:      I personally reviewed the images from the following studies.  X-ray of the lumbar spine from May 17, 2023 and MRI of the lumbar spine from 2023  There is no new evidence of fracture.  There is expected postoperative changes from the L2 and L3 kyphoplasty    Medical Decision Makin-year-old female s/p L2 and L3 kyphoplasty on 2023  -She has improved significantly from her lumbar fractures and kyphoplasty.  She reports that over the past several weeks she has developed severe new severe back pain and right lower extremity pain.  She denies any changes in strength or sensation.  The pain that she describes is concerning for an L5 or S1 radiculopathy.  I will order a Medrol Dosepak and gabapentin to be sent to her pharmacy  -I will plan to see her back in 4 to 6 weeks with a repeat MRI to evaluate for any disc herniation or canal stenosis  Diagnoses and all orders for this visit:    1. Closed wedge compression fracture of L3 vertebra with delayed healing, subsequent encounter (Primary)    2. Closed compression fracture of L2 lumbar vertebra, sequela    3. " Lumbar radiculopathy  -     MRI Lumbar Spine Without Contrast; Future  -     gabapentin (NEURONTIN) 300 MG capsule; Take 1 capsule by mouth 3 (Three) Times a Day.  Dispense: 90 capsule; Refill: 1    Other orders  -     methylPREDNISolone (MEDROL) 4 MG dose pack; Take as directed on package instructions.  Dispense: 21 tablet; Refill: 0      Return in about 4 weeks (around 6/23/2023).    I spent 30 minutes reviewing the medical record, reviewing the x-ray images, discussing the management of lumbar radiculopathies

## 2023-05-26 ENCOUNTER — OFFICE VISIT (OUTPATIENT)
Dept: NEUROSURGERY | Facility: CLINIC | Age: 68
End: 2023-05-26
Payer: COMMERCIAL

## 2023-05-26 VITALS
WEIGHT: 169.2 LBS | SYSTOLIC BLOOD PRESSURE: 140 MMHG | HEIGHT: 67 IN | BODY MASS INDEX: 26.56 KG/M2 | TEMPERATURE: 96.9 F | DIASTOLIC BLOOD PRESSURE: 80 MMHG

## 2023-05-26 DIAGNOSIS — M54.16 LUMBAR RADICULOPATHY: ICD-10-CM

## 2023-05-26 DIAGNOSIS — S32.030G CLOSED WEDGE COMPRESSION FRACTURE OF L3 VERTEBRA WITH DELAYED HEALING, SUBSEQUENT ENCOUNTER: Primary | ICD-10-CM

## 2023-05-26 DIAGNOSIS — S32.020S CLOSED COMPRESSION FRACTURE OF L2 LUMBAR VERTEBRA, SEQUELA: ICD-10-CM

## 2023-05-26 RX ORDER — GABAPENTIN 300 MG/1
300 CAPSULE ORAL 3 TIMES DAILY
Qty: 90 CAPSULE | Refills: 1 | Status: SHIPPED | OUTPATIENT
Start: 2023-05-26

## 2023-05-26 RX ORDER — METHYLPREDNISOLONE 4 MG/1
TABLET ORAL
Qty: 21 TABLET | Refills: 0 | Status: SHIPPED | OUTPATIENT
Start: 2023-05-26

## 2023-05-26 RX ORDER — PITAVASTATIN CALCIUM 4.18 MG/1
TABLET, FILM COATED ORAL
COMMUNITY

## 2023-07-26 RX ORDER — FOLIC ACID 1 MG/1
TABLET ORAL
Qty: 90 TABLET | Refills: 1 | Status: SHIPPED | OUTPATIENT
Start: 2023-07-26

## 2023-08-15 RX ORDER — DILTIAZEM HYDROCHLORIDE 120 MG/1
CAPSULE, COATED, EXTENDED RELEASE ORAL
Qty: 90 CAPSULE | Refills: 2 | Status: SHIPPED | OUTPATIENT
Start: 2023-08-15

## 2023-08-29 ENCOUNTER — HOSPITAL ENCOUNTER (OUTPATIENT)
Dept: BONE DENSITY | Facility: HOSPITAL | Age: 68
Discharge: HOME | End: 2023-08-29
Admitting: PHYSICIAN ASSISTANT
Payer: COMMERCIAL

## 2023-08-29 PROCEDURE — 77080 DXA BONE DENSITY AXIAL: CPT

## 2023-09-27 RX ORDER — VALSARTAN 320 MG/1
TABLET ORAL
Qty: 90 TABLET | Refills: 0 | Status: SHIPPED | OUTPATIENT
Start: 2023-09-27

## 2023-10-04 ENCOUNTER — OFFICE VISIT (OUTPATIENT)
Dept: FAMILY MEDICINE CLINIC | Facility: CLINIC | Age: 68
End: 2023-10-04
Payer: COMMERCIAL

## 2023-10-04 VITALS
TEMPERATURE: 97.6 F | SYSTOLIC BLOOD PRESSURE: 126 MMHG | BODY MASS INDEX: 25.31 KG/M2 | OXYGEN SATURATION: 100 % | RESPIRATION RATE: 16 BRPM | HEART RATE: 70 BPM | WEIGHT: 167 LBS | DIASTOLIC BLOOD PRESSURE: 76 MMHG | HEIGHT: 68 IN

## 2023-10-04 DIAGNOSIS — I10 ESSENTIAL HYPERTENSION: ICD-10-CM

## 2023-10-04 DIAGNOSIS — I73.9 PAD (PERIPHERAL ARTERY DISEASE): Chronic | ICD-10-CM

## 2023-10-04 DIAGNOSIS — E78.2 MIXED HYPERLIPIDEMIA: ICD-10-CM

## 2023-10-04 DIAGNOSIS — R05.3 CHRONIC COUGH: ICD-10-CM

## 2023-10-04 DIAGNOSIS — I65.23 BILATERAL CAROTID ARTERY STENOSIS: Chronic | ICD-10-CM

## 2023-10-04 DIAGNOSIS — K21.00 GASTROESOPHAGEAL REFLUX DISEASE WITH ESOPHAGITIS WITHOUT HEMORRHAGE: Primary | ICD-10-CM

## 2023-10-04 DIAGNOSIS — Z72.0 TOBACCO ABUSE: Chronic | ICD-10-CM

## 2023-10-04 DIAGNOSIS — R73.01 IMPAIRED FASTING GLUCOSE: ICD-10-CM

## 2023-10-04 PROCEDURE — 99214 OFFICE O/P EST MOD 30 MIN: CPT | Performed by: PHYSICIAN ASSISTANT

## 2023-10-04 PROCEDURE — 71046 X-RAY EXAM CHEST 2 VIEWS: CPT | Performed by: PHYSICIAN ASSISTANT

## 2023-10-04 RX ORDER — METFORMIN HYDROCHLORIDE 500 MG/1
TABLET, EXTENDED RELEASE ORAL
Qty: 180 TABLET | Refills: 3 | Status: SHIPPED | OUTPATIENT
Start: 2023-10-04

## 2023-10-04 NOTE — PROGRESS NOTES
"Subjective   Nelsy Stokes is a 68 y.o. female.     History of Present Illness    Since the last visit, she has overall felt tired.  She has Primary Hypertension and well controlled on current medication, Impaired fasting glucose and will monitor labs to watch for DMII, Vitamin D deficiency and labs are at goal >30 ng/mL, and peripheral vascular disease and carotid artery disease also mixed hyperlipidemia she remains on level low and Zetia and tolerates dosing.... LDL goal is to be less than 70 and not met on last labs. .  she has been compliant with current medications have reviewed them.  The patient denies medication side effects.  Will refill medications. /76   Pulse 70   Temp 97.6 °F (36.4 °C)   Resp 16   Ht 172.7 cm (68\")   Wt 75.8 kg (167 lb)   LMP  (LMP Unknown)   SpO2 100%   BMI 25.39 kg/m²   She saw the pulmonologist as I noted below in May for the COPD and continued on Trelegy inhaler and albuterol as needed noting she was stable.... Reporting cough for cough for a few months it is day and night keeps her up at night she has had some breakthrough GERD and she has used her rescue albuterol inhaler and it does not help.  There is sometimes wheezing.... She is not having postnasal drainage-----plan to get chest x-ray to take a look  No new shortness of breath had echo several months ago and was normal and has seen cardiology  Results for orders placed or performed in visit on 07/03/23   Comprehensive metabolic panel    Specimen: Blood   Result Value Ref Range    Glucose 83 65 - 99 mg/dL    BUN 10 8 - 23 mg/dL    Creatinine 0.90 0.57 - 1.00 mg/dL    EGFR Result 69.8 >60.0 mL/min/1.73    BUN/Creatinine Ratio 11.1 7.0 - 25.0    Sodium 139 136 - 145 mmol/L    Potassium 5.0 3.5 - 5.2 mmol/L    Chloride 100 98 - 107 mmol/L    Total CO2 27.2 22.0 - 29.0 mmol/L    Calcium 9.9 8.6 - 10.5 mg/dL    Total Protein 6.8 6.0 - 8.5 g/dL    Albumin 4.6 3.5 - 5.2 g/dL    Globulin 2.2 gm/dL    A/G Ratio 2.1 " g/dL    Total Bilirubin 0.3 0.0 - 1.2 mg/dL    Alkaline Phosphatase 67 39 - 117 U/L    AST (SGOT) 17 1 - 32 U/L    ALT (SGPT) 17 1 - 33 U/L   Lipid panel    Specimen: Blood   Result Value Ref Range    Total Cholesterol 197 0 - 200 mg/dL    Triglycerides 102 0 - 150 mg/dL    HDL Cholesterol 49 40 - 60 mg/dL    VLDL Cholesterol Kale 18 5 - 40 mg/dL    LDL Chol Calc (NIH) 130 (H) 0 - 100 mg/dL   TSH    Specimen: Blood   Result Value Ref Range    TSH 2.480 0.270 - 4.200 uIU/mL   Vitamin D,25-Hydroxy    Specimen: Blood   Result Value Ref Range    25 Hydroxy, Vitamin D 33.4 30.0 - 100.0 ng/ml   Hemoglobin A1c    Specimen: Blood   Result Value Ref Range    Hemoglobin A1C 6.30 (H) 4.80 - 5.60 %   Folate    Specimen: Blood   Result Value Ref Range    Folate 17.80 4.78 - 24.20 ng/mL   Vitamin B12    Specimen: Blood   Result Value Ref Range    Vitamin B-12 895 211 - 946 pg/mL   CBC and Differential    Specimen: Blood   Result Value Ref Range    WBC 6.80 3.40 - 10.80 10*3/mm3    RBC 4.93 3.77 - 5.28 10*6/mm3    Hemoglobin 15.5 12.0 - 15.9 g/dL    Hematocrit 45.4 34.0 - 46.6 %    MCV 92.1 79.0 - 97.0 fL    MCH 31.4 26.6 - 33.0 pg    MCHC 34.1 31.5 - 35.7 g/dL    RDW 12.7 12.3 - 15.4 %    Platelets 324 140 - 450 10*3/mm3    Neutrophil Rel % 45.2 42.7 - 76.0 %    Lymphocyte Rel % 43.2 19.6 - 45.3 %    Monocyte Rel % 8.8 5.0 - 12.0 %    Eosinophil Rel % 1.6 0.3 - 6.2 %    Basophil Rel % 0.9 0.0 - 1.5 %    Neutrophils Absolute 3.07 1.70 - 7.00 10*3/mm3    Lymphocytes Absolute 2.94 0.70 - 3.10 10*3/mm3    Monocytes Absolute 0.60 0.10 - 0.90 10*3/mm3    Eosinophils Absolute 0.11 0.00 - 0.40 10*3/mm3    Basophils Absolute 0.06 0.00 - 0.20 10*3/mm3    Immature Granulocyte Rel % 0.3 0.0 - 0.5 %    Immature Grans Absolute 0.02 0.00 - 0.05 10*3/mm3    nRBC 0.0 0.0 - 0.2 /100 WBC       Cough for 2 mos-----no pnd and wheezing;---not taking her PPI!!!  Does get GERD.     No visits with results within 3 Month(s) from this visit.   Latest known  visit with results is:   Telephone on 07/03/2023   Component Date Value Ref Range Status    Glucose 07/03/2023 83  65 - 99 mg/dL Final    BUN 07/03/2023 10  8 - 23 mg/dL Final    Creatinine 07/03/2023 0.90  0.57 - 1.00 mg/dL Final    EGFR Result 07/03/2023 69.8  >60.0 mL/min/1.73 Final    Comment: GFR Normal >60  Chronic Kidney Disease <60  Kidney Failure <15      BUN/Creatinine Ratio 07/03/2023 11.1  7.0 - 25.0 Final    Sodium 07/03/2023 139  136 - 145 mmol/L Final    Potassium 07/03/2023 5.0  3.5 - 5.2 mmol/L Final    Chloride 07/03/2023 100  98 - 107 mmol/L Final    Total CO2 07/03/2023 27.2  22.0 - 29.0 mmol/L Final    Calcium 07/03/2023 9.9  8.6 - 10.5 mg/dL Final    Total Protein 07/03/2023 6.8  6.0 - 8.5 g/dL Final    Albumin 07/03/2023 4.6  3.5 - 5.2 g/dL Final    Globulin 07/03/2023 2.2  gm/dL Final    A/G Ratio 07/03/2023 2.1  g/dL Final    Total Bilirubin 07/03/2023 0.3  0.0 - 1.2 mg/dL Final    Alkaline Phosphatase 07/03/2023 67  39 - 117 U/L Final    AST (SGOT) 07/03/2023 17  1 - 32 U/L Final    ALT (SGPT) 07/03/2023 17  1 - 33 U/L Final    Total Cholesterol 07/03/2023 197  0 - 200 mg/dL Final    Comment: Cholesterol Reference Ranges  (U.S[place] Department of Health and Human Services ATP III  Classifications)  Desirable          <200 mg/dL  Borderline High    200-239 mg/dL  High Risk          >240 mg/dL  Triglyceride Reference Ranges  (U.S. Department of Health and Human Services ATP III  Classifications)  Normal           <150 mg/dL  Borderline High  150-199 mg/dL  High             200-499 mg/dL  Very High        >500 mg/dL  HDL Reference Ranges  (U.S. Department of Health and Human Services ATP III  Classifications)  Low     <40 mg/dl (major risk factor for CHD)  High    >60 mg/dl ('negative' risk factor for CHD)  LDL Reference Ranges  (U.S. Department of Health and Human Services ATP III  Classifications)  Optimal          <100 mg/dL  Near Optimal     100-129 mg/dL  Borderline High  130-159 mg/dL  High              160-189 mg/dL  Very High        >189 mg/dL      Triglycerides 07/03/2023 102  0 - 150 mg/dL Final    HDL Cholesterol 07/03/2023 49  40 - 60 mg/dL Final    VLDL Cholesterol Kale 07/03/2023 18  5 - 40 mg/dL Final    LDL Chol Calc (NIH) 07/03/2023 130 (H)  0 - 100 mg/dL Final    WBC 07/03/2023 6.80  3.40 - 10.80 10*3/mm3 Final    RBC 07/03/2023 4.93  3.77 - 5.28 10*6/mm3 Final    Hemoglobin 07/03/2023 15.5  12.0 - 15.9 g/dL Final    Hematocrit 07/03/2023 45.4  34.0 - 46.6 % Final    MCV 07/03/2023 92.1  79.0 - 97.0 fL Final    MCH 07/03/2023 31.4  26.6 - 33.0 pg Final    MCHC 07/03/2023 34.1  31.5 - 35.7 g/dL Final    RDW 07/03/2023 12.7  12.3 - 15.4 % Final    Platelets 07/03/2023 324  140 - 450 10*3/mm3 Final    Neutrophil Rel % 07/03/2023 45.2  42.7 - 76.0 % Final    Lymphocyte Rel % 07/03/2023 43.2  19.6 - 45.3 % Final    Monocyte Rel % 07/03/2023 8.8  5.0 - 12.0 % Final    Eosinophil Rel % 07/03/2023 1.6  0.3 - 6.2 % Final    Basophil Rel % 07/03/2023 0.9  0.0 - 1.5 % Final    Neutrophils Absolute 07/03/2023 3.07  1.70 - 7.00 10*3/mm3 Final    Lymphocytes Absolute 07/03/2023 2.94  0.70 - 3.10 10*3/mm3 Final    Monocytes Absolute 07/03/2023 0.60  0.10 - 0.90 10*3/mm3 Final    Eosinophils Absolute 07/03/2023 0.11  0.00 - 0.40 10*3/mm3 Final    Basophils Absolute 07/03/2023 0.06  0.00 - 0.20 10*3/mm3 Final    Immature Granulocyte Rel % 07/03/2023 0.3  0.0 - 0.5 % Final    Immature Grans Absolute 07/03/2023 0.02  0.00 - 0.05 10*3/mm3 Final    nRBC 07/03/2023 0.0  0.0 - 0.2 /100 WBC Final    TSH 07/03/2023 2.480  0.270 - 4.200 uIU/mL Final    25 Hydroxy, Vitamin D 07/03/2023 33.4  30.0 - 100.0 ng/ml Final    Comment: Reference Range for Total Vitamin D 25(OH)  Deficiency <20.0 ng/mL  Insufficiency 21-29 ng/mL  Sufficiency  ng/mL  Toxicity >100 ng/ml      Hemoglobin A1C 07/03/2023 6.30 (H)  4.80 - 5.60 % Final    Comment: Hemoglobin A1C Ranges:  Increased Risk for Diabetes  5.7% to 6.4%  Diabetes                      >= 6.5%  Diabetic Goal                < 7.0%      Folate 07/03/2023 17.80  4.78 - 24.20 ng/mL Final    Results may be falsely increased if patient taking Biotin.    Vitamin B-12 07/03/2023 895  211 - 946 pg/mL Final    Results may be falsely increased if patient taking Biotin.     Did see Dr Norton 10-2-23--noted stable microscopic hematuria, chronic stable kidney stones\\    X-Ray  Interpretation report in house X-rays that I personally viewed    Relevant Clinical Issues/Diagnoses/Indications: Coughing for the last few months and a COPD patient and smoker        Clinical Findings: No lung mass or infiltrate, normal heart size          Comparative Data:  yes          Date of Previous X-ray:  12-14-18  Change on current X-ray:  no       Last cardio notes 5-3-23      1/2.  APCs and PSVT noted on Holter monitor in 2022.  She was tried on diltiazem 240 mg daily and this made her feel poorly.  She is currently taking diltiazem 120 mg daily and her palpitations have resolved.  Single APC noted on today's EKG.  We will still have the echocardiogram completed.     3.  Chest Pain: Resolved.  Cancel stress test.  She certainly is at risk of coronary artery disease with her PAD and other risk factors (hypertension, hyperlipidemia, and cigarette smoking).  If she has recurrent chest pain, order stress test.     4.  Peripheral Arterial Disease: Followed by Dr. Zac Leong. Known subclavian stenosis, carotid stenosis, PAD of lower extremities.  ---saw him 10-1-23---all stable and f/u 1 yr     5.  Hypertension: Blood pressure well controlled today.     6.  Hyperlipidemia: Goal LDL less than 70 and followed by her PCP.  Continue ezetimibe.     7.  She would highly benefit from standing from cigarette smoking.     Echo was great 5-3-23     Last pulm visit---DR DELGADO Bosch for COPD ----cont on Trelegy ----5-1-23 and f/u 1 yr        Has been to vascular Dr Leong  LAST visit neurosurgeon Dr Kay---6-21-23  History of Present  Illness: Nelsy Stokes is a 68 y.o. female is here today for follow-up on back and right leg pain. She is s/p L2-L3 Kyphoplasty 2/14//23. MRI Lumbar done 6/19/23. Today, she states she is doing well.  She completed to [facility] and takes Gabapentin at night which has helped with the pain.  She reports she has had a significant improvement in her lower back pain and lower extremity pain.  Denies any recent changes in strength or sensation  The following portions of the patient's history were reviewed and updated as appropriate: allergies, current medications, past family history, past medical history, past social history, past surgical history, and problem list.    Review of Systems   Constitutional:  Positive for appetite change and fatigue.   Respiratory:  Positive for cough and wheezing.    Gastrointestinal:  Positive for nausea.   Psychiatric/Behavioral:  Positive for sleep disturbance.      Objective   Physical Exam  Vitals and nursing note reviewed.   Constitutional:       General: She is not in acute distress.     Appearance: She is well-developed. She is not ill-appearing or toxic-appearing.   HENT:      Head: Normocephalic.      Right Ear: External ear normal.      Left Ear: External ear normal.      Nose: Nose normal.      Mouth/Throat:      Pharynx: Oropharynx is clear.   Eyes:      General: No scleral icterus.     Conjunctiva/sclera: Conjunctivae normal.      Pupils: Pupils are equal, round, and reactive to light.   Neck:      Thyroid: No thyromegaly.   Cardiovascular:      Rate and Rhythm: Normal rate and regular rhythm.      Pulses: Normal pulses.      Heart sounds: Normal heart sounds. No murmur heard.  Pulmonary:      Effort: Pulmonary effort is normal. No respiratory distress.      Breath sounds: Normal breath sounds. No rales.   Musculoskeletal:         General: No deformity. Normal range of motion.      Cervical back: Normal range of motion and neck supple.      Right lower leg: No edema.      Left  lower leg: No edema.   Skin:     General: Skin is warm and dry.      Findings: No rash.   Neurological:      General: No focal deficit present.      Mental Status: She is alert and oriented to person, place, and time. Mental status is at baseline.   Psychiatric:         Mood and Affect: Mood normal.         Behavior: Behavior normal.         Thought Content: Thought content normal.         Judgment: Judgment normal.         Assessment & Plan   Diagnoses and all orders for this visit:    1. Gastroesophageal reflux disease with esophagitis without hemorrhage (Primary)    2. Chronic cough  -     XR Chest PA & Lateral        Restart PPI----Protonix----if helps nausea and cough---from GERD--- if GERD or nausea persist after restarting pantoprazole will refer to GI  Still need eval lungs--update CXR--had low-dose CT of the chest in March----if restart PPI and continue cough a regular CT of the chest will need to be ordered  Plan, Nelsy Stokes, was seen today.  she was seen for HTN and continue medication, Imparied fasting glucose and plan follow up labs, diet, and exercise, Hyperlipidemia and will continue current medication, and Vitamin D deficiency and supplemented.  COPD and continue inhalers per pulmonologist  Peripheral vascular disease, carotid artery disease followed by vascular yearly  History of osteoporosis remains on Prolia  Continued on vitamin D and folic acid  Stop smoking         Answers submitted by the patient for this visit:  Primary Reason for Visit (Submitted on 10/1/2023)  What is the primary reason for your visit?: Cough

## 2023-10-10 ENCOUNTER — TELEPHONE (OUTPATIENT)
Dept: FAMILY MEDICINE CLINIC | Facility: CLINIC | Age: 68
End: 2023-10-10

## 2023-10-10 NOTE — TELEPHONE ENCOUNTER
"  Hub staff attempted to follow warm transfer process and was unsuccessful     Caller: Nelsy Stokes \"BREANNA\"    Relationship to patient: Self    Best call back number: [phone]    Patient is needing: RESCHEDULE LAB APPOINTMENT. REQUEST CALL BACK.          "

## 2023-10-25 DIAGNOSIS — I10 ESSENTIAL HYPERTENSION: Primary | ICD-10-CM

## 2023-10-25 DIAGNOSIS — E87.8 ELECTROLYTE ABNORMALITY: ICD-10-CM

## 2023-10-25 DIAGNOSIS — R73.01 IMPAIRED FASTING GLUCOSE: ICD-10-CM

## 2023-10-28 LAB
BUN SERPL-MCNC: 11 MG/DL (ref 8–23)
BUN/CREAT SERPL: 12.1 (ref 7–25)
CALCIUM SERPL-MCNC: 10 MG/DL (ref 8.6–10.5)
CHLORIDE SERPL-SCNC: 100 MMOL/L (ref 98–107)
CO2 SERPL-SCNC: 28.9 MMOL/L (ref 22–29)
CREAT SERPL-MCNC: 0.91 MG/DL (ref 0.57–1)
EGFRCR SERPLBLD CKD-EPI 2021: 68.9 ML/MIN/1.73
GLUCOSE SERPL-MCNC: 89 MG/DL (ref 65–99)
POTASSIUM SERPL-SCNC: 5 MMOL/L (ref 3.5–5.2)
SODIUM SERPL-SCNC: 140 MMOL/L (ref 136–145)

## 2023-10-30 DIAGNOSIS — E87.8 ELECTROLYTE ABNORMALITY: Primary | ICD-10-CM

## 2023-10-31 ENCOUNTER — APPOINTMENT (OUTPATIENT)
Dept: GENERAL RADIOLOGY | Facility: HOSPITAL | Age: 68
End: 2023-10-31
Payer: COMMERCIAL

## 2023-10-31 ENCOUNTER — HOSPITAL ENCOUNTER (EMERGENCY)
Facility: HOSPITAL | Age: 68
Discharge: HOME | End: 2023-10-31
Attending: STUDENT IN AN ORGANIZED HEALTH CARE EDUCATION/TRAINING PROGRAM | Admitting: STUDENT IN AN ORGANIZED HEALTH CARE EDUCATION/TRAINING PROGRAM
Payer: COMMERCIAL

## 2023-10-31 ENCOUNTER — APPOINTMENT (OUTPATIENT)
Dept: CT IMAGING | Facility: HOSPITAL | Age: 68
End: 2023-10-31
Payer: COMMERCIAL

## 2023-10-31 VITALS
TEMPERATURE: 97.5 F | DIASTOLIC BLOOD PRESSURE: 100 MMHG | HEART RATE: 79 BPM | RESPIRATION RATE: 16 BRPM | BODY MASS INDEX: 26.21 KG/M2 | WEIGHT: 167 LBS | HEIGHT: 67 IN | OXYGEN SATURATION: 98 % | SYSTOLIC BLOOD PRESSURE: 165 MMHG

## 2023-10-31 DIAGNOSIS — S42.292A HUMERAL HEAD FRACTURE, LEFT, CLOSED, INITIAL ENCOUNTER: ICD-10-CM

## 2023-10-31 DIAGNOSIS — M79.672 LEFT FOOT PAIN: ICD-10-CM

## 2023-10-31 DIAGNOSIS — S09.90XA CLOSED HEAD INJURY, INITIAL ENCOUNTER: Primary | ICD-10-CM

## 2023-10-31 PROCEDURE — 99284 EMERGENCY DEPT VISIT MOD MDM: CPT

## 2023-10-31 PROCEDURE — 70450 CT HEAD/BRAIN W/O DYE: CPT

## 2023-10-31 PROCEDURE — 73630 X-RAY EXAM OF FOOT: CPT

## 2023-10-31 PROCEDURE — 73030 X-RAY EXAM OF SHOULDER: CPT

## 2023-10-31 RX ORDER — LIDOCAINE 50 MG/G
1 PATCH TOPICAL EVERY 24 HOURS
Qty: 30 PATCH | Refills: 0 | Status: SHIPPED | OUTPATIENT
Start: 2023-10-31

## 2023-10-31 RX ORDER — METHOCARBAMOL 750 MG/1
750 TABLET, FILM COATED ORAL 3 TIMES DAILY PRN
Qty: 30 TABLET | Refills: 0 | Status: SHIPPED | OUTPATIENT
Start: 2023-10-31

## 2023-10-31 RX ORDER — ACETAMINOPHEN 500 MG
1000 TABLET ORAL 3 TIMES DAILY PRN
Qty: 30 TABLET | Refills: 0 | Status: SHIPPED | OUTPATIENT
Start: 2023-10-31

## 2023-10-31 NOTE — ED PROVIDER NOTES
EMERGENCY DEPARTMENT ENCOUNTER    Room Number:  36/36  PCP: Gretchen King PA-C  History obtained from: Patient      HPI:  Chief Complaint: Fall off porch  A complete HPI/ROS/PMH/PSH/SH/FH are unobtainable due to: Not applicable  Context: Nelsy Stokes is a 68 y.o. female who presents to the ED c/o left shoulder pain, left foot pain status post falling from her porch.  She was standing on a bale of hay hanging a spider from hanging light when she lost her balance and fell onto the grass, slid several feet.  Currently complains of left head pain, left shoulder pain, left foot pain.  Got up and started to make chili after falling.            PAST MEDICAL HISTORY  Active Ambulatory Problems     Diagnosis Date Noted    Asymptomatic microscopic hematuria 07/08/2018    Subclavian arterial stenosis 07/20/2018    PAD (peripheral artery disease) 07/20/2018    Bilateral carotid artery disease 07/20/2018    Mixed hyperlipidemia 07/20/2018    Osteopenia of multiple sites 07/20/2018    Aortic atherosclerosis 10/23/2018    Left carotid artery stenosis 12/21/2018    Essential hypertension 01/18/2019    Gastroesophageal reflux disease 04/29/2019    Impaired fasting glucose 07/29/2019    Claudication 09/18/2013    Tobacco abuse 09/18/2013    Low folic acid 02/07/2020    Biliary dyskinesia 10/27/2020    Calculus of gallbladder with chronic cholecystitis without obstruction 11/18/2020    Umbilical hernia without obstruction and without gangrene 11/18/2020    APC (atrial premature contractions) 10/14/2022    PSVT (paroxysmal supraventricular tachycardia)     Squamous cell skin cancer 10/27/2022    Compression fracture of L2 lumbar vertebra 12/22/2022    Wedge compression fracture of third lumbar vertebra with delayed healing 01/31/2023     Resolved Ambulatory Problems     Diagnosis Date Noted    Situational anxiety 01/18/2019    Dysphoric mood 07/29/2019    Bradycardia 09/18/2013    Chest pain 09/18/2013    Palpitations 09/18/2013     Nausea 09/02/2020    Pre-op evaluation 01/31/2023     Past Medical History:   Diagnosis Date    Anxiety     Arthritis     Asthma     Borderline diabetes     Carotid artery stenosis     COPD (chronic obstructive pulmonary disease)     Coronary artery disease     GERD (gastroesophageal reflux disease)     History of bradycardia     History of chest pain     History of colon polyps     History of COVID-19 11/01/2022    Hyperlipidemia     Hypertension     Low back pain     Lumbosacral disc disease     PONV (postoperative nausea and vomiting)     PVD (peripheral vascular disease)     Subclavian artery stenosis          PAST SURGICAL HISTORY  Past Surgical History:   Procedure Laterality Date    CAROTID STENT Right 2018    CHEST WALL BIOPSY  09/2022    CHOLECYSTECTOMY WITH INTRAOPERATIVE CHOLANGIOGRAM N/A 11/18/2020    Procedure: LAPAROSCOPIC CHOLECYSTECTOMY WITH INTRAOPERATIVE CHOLANGIOGRAM, OPEN UMBILICAL HERNIA REPAIR;  Surgeon: Maria D Ariza MD;  Location: [facility] OR;  Service: General;  Laterality: N/A;    COLONOSCOPY N/A 2017    ENDOSCOPY N/A 10/09/2020    Procedure: ESOPHAGOGASTRODUODENOSCOPY WITH COLD BIOPSIES;  Surgeon: Jacque Huber MD;  Location: [facility] ENDOSCOPY;  Service: Gastroenterology;  Laterality: N/A;  PRE: REFLUX  POST: ESOPHAGITIS, GASTRITIS, DUODENITIS    EXTRACORPOREAL SHOCK WAVE LITHOTRIPSY (ESWL)  09/09/2020    [facility]UR[facility]ERY [facility] IN [place]    KNEE CARTILAGE SURGERY Right 1990s    KYPHOPLASTY Bilateral 02/14/2023    Procedure: Lumbar 2 lumbar 3 kyphoplasty;  Surgeon: Ramon Kay MD;  Location: [facility] OR 18/19;  Service: Neurosurgery;  Laterality: Bilateral;    VERTEBROPLASTY  2023         FAMILY HISTORY  Family History   Problem Relation Age of Onset    Hypertension Father     Lung disease Father     Heart attack Father 62    Anesthesia problems Father     Heart failure Father 88    Aneurysm Father         Abdominal Aortic Aneurysm    COPD Father     Atrial  fibrillation Father     Gallbladder disease Father     Lung cancer Father 75    Prostate cancer Father     Cancer Sister     Gallbladder disease Sister     Heart disease Mother 83    Hypertension Mother     Atrial fibrillation Mother     Gallbladder disease Sister     Malig Hyperthermia Neg Hx          SOCIAL HISTORY  Social History     Socioeconomic History    Marital status:    Tobacco Use    Smoking status: Every Day     Packs/day: 1.00     Years: 50.00     Additional pack years: 0.00     Total pack years: 50.00     Types: Cigarettes    Smokeless tobacco: Never    Tobacco comments:     caff use: 3 cups daily.  Began smoking at age 15.  Smoked .75 ppd for 7 years and otherwise 1 ppd for a 48.25 pack year history.   Vaping Use    Vaping Use: Never used   Substance and Sexual Activity    Alcohol use: Not Currently    Drug use: No    Sexual activity: Not Currently     Partners: Male     Comment:  had prostate cancer         ALLERGIES  Pitavastatin        REVIEW OF SYSTEMS    As per \[facility] [place] [facility]\""      PHYSICAL EXAM  ED Triage Vitals   Temp Heart Rate Resp BP SpO2   10/31/23 1001 10/31/23 1001 10/31/23 1022 10/31/23 1016 10/31/23 1001   97.5 °F (36.4 °C) 104 16 163/80 94 %      Temp src Heart Rate Source Patient Position BP Location FiO2 (%)   10/31/23 1001 10/31/23 1001 -- -- --   Tympanic Monitor          Physical Exam  Constitutional:       General: She is not in acute distress.  HENT:      Head: Normocephalic and atraumatic.   Cardiovascular:      Rate and Rhythm: Normal rate and regular rhythm.   Pulmonary:      Effort: Pulmonary effort is normal. No respiratory distress.   Abdominal:      General: There is no distension.      Palpations: Abdomen is soft.      Tenderness: There is no abdominal tenderness.   Musculoskeletal:         General: Tenderness present. No swelling or deformity.      Comments: Tenderness to palpation over the left lateral shoulder and the left lateral foot without obvious deformity    Skin:     General: Skin is warm and dry.   Neurological:      Mental Status: She is alert. Mental status is at baseline.           Vital signs and nursing notes reviewed.          LAB RESULTS  No results found for this or any previous visit (from the past 24 hour(s)).    Ordered the above labs and reviewed the results.        RADIOLOGY  XR Foot 3+ View Left    Result Date: 10/31/2023  EXAM: XR FOOT 3+ VW LEFT-  INDICATION: Fall  COMPARISON: None available       No fracture. Shortened first phalanx. Otherwise normal alignment. Mild forefoot osteoarthritis.    This report was finalized on 10/31/2023 11:55 AM by Dr. Arias Vargas M.D on Workstation: [id number]      XR Shoulder 2+ View Left    Result Date: 10/31/2023  EXAM: XR SHOULDER 2+ VW LEFT-  INDICATION: Fall  COMPARISON: None available       Acute nondisplaced left humeral head fracture oriented longitudinally extending to the proximal metaphysis. Normal glenohumeral alignment. Glenohumeral, acromioclavicular and subacromial joint spaces are maintained.    This report was finalized on 10/31/2023 11:47 AM by Dr. Arias Vargas M.D on Workstation: [id number]      CT Head Without Contrast    Result Date: 10/31/2023  CT HEAD WITHOUT CONTRAST  CLINICAL HISTORY: Fall with headache.  TECHNIQUE: CT scan of the head was obtained with 3 mm axial soft tissue and 2 mm axial bone algorithm algorithm images. No intravenous contrast was administered. Sagittal and coronal reconstructions were obtained.  COMPARISON: CT head dated 2/13/2020.  FINDINGS:   There is no evidence for a calvarial fracture or an acute extra-axial hemorrhage.  The ventricles, sulci, and cisterns are age-appropriate. The basal ganglia and thalami are unremarkable in appearance. The gray-white matter differentiation is within normal limits. The posterior fossa structures are within normal limits.       No CT evidence for acute traumatic intracranial pathology.    Radiation dose reduction techniques were  utilized, including automated exposure control and exposure modulation based on body size.  This report was finalized on 10/31/2023 11:32 AM by Dr. Liu Estrada M.D on Workstation: [id number]       Ordered the above noted radiological studies. Reviewed by me in PACS.              MEDICATIONS GIVEN IN ER  Medications - No data to display            MEDICAL DECISION MAKING, PROGRESS, and CONSULTS    MDM: Patient presented emergency department with multiple injuries status post fall, otherwise well-appearing, vitals otherwise stable.  Head imaging reassuring, left shoulder imaging demonstrates nondisplaced humeral fracture.  Will place in sling and discharged with orthopedic follow-up.  Given prescriptions for pain control and discharged with outpatient follow-up.    All labs have been independently reviewed by me.  All radiology studies have been reviewed by me and I have also reviewed the radiology report.   EKG's independently viewed and interpreted by me.  Discussion below represents my analysis of pertinent findings related to patient's condition, differential diagnosis, treatment plan and final disposition.      Additional sources:  - Discussed/ obtained information from independent historians: Daughter at bedside reports that patient only came to the hospital when her pain was worsening.    - External (non-ED) record review:     - Chronic or social conditions impacting care: Tobacco use disorder     - Shared decision making:  Discussed plan for symptomatic treatment, outpatient follow-up, patient agrees.      Orders placed during this visit:  Orders Placed This Encounter   Procedures    CT Head Without Contrast    XR Shoulder 2+ View Left    XR Foot 3+ View Left    Obtain & Apply The Following- Upper extremity; Sling         Additional orders considered but not ordered:  Consider neck imaging however patient has no tenderness and no significant distracting injury.          Differential diagnosis includes but is  not limited to:    Fracture, contusion, sprain, strain, Intracranial hemorrhage      Independent interpretation of labs, radiology studies, and discussions with consultants:  ED Course as of 10/31/23 1229   Tue Oct 31, 2023   1144 Left foot x-ray interpreted myself:  No fracture [FS]   1219 Left shoulder x-ray interpreted myself:  Small cortical disruption of the left lateral humeral head [FS]      ED Course User Index  [FS] Bertram Chapman MD           DIAGNOSIS  Final diagnoses:   Closed head injury, initial encounter   Left foot pain   Humeral head fracture, left, closed, initial encounter         DISPOSITION  Discharged home        Latest Documented Vital Signs:  As of 12:29 EDT  BP- 165/100 HR- 79 Temp- 97.5 °F (36.4 °C) (Tympanic) O2 sat- 98%              --    Please note that portions of this were completed with a voice recognition program.       Note Disclaimer: At [facility], we believe that sharing information builds trust and better relationships. You are receiving this note because you are receiving care at [facility] or recently visited. It is possible you will see health information before a provider has talked with you about it. This kind of information can be easy to misunderstand. To help you fully understand what it means for your health, we urge you to discuss this note with your provider.             Bertram Chapman MD  10/31/23 1226

## 2023-11-10 LAB
BUN SERPL-MCNC: 12 MG/DL (ref 8–27)
BUN/CREAT SERPL: 13 (ref 12–28)
CALCIUM SERPL-MCNC: 9.7 MG/DL (ref 8.7–10.3)
CHLORIDE SERPL-SCNC: 99 MMOL/L (ref 96–106)
CO2 SERPL-SCNC: 24 MMOL/L (ref 20–29)
CREAT SERPL-MCNC: 0.96 MG/DL (ref 0.57–1)
EGFRCR SERPLBLD CKD-EPI 2021: 64 ML/MIN/1.73
GLUCOSE SERPL-MCNC: 100 MG/DL (ref 70–99)
POTASSIUM SERPL-SCNC: 4.7 MMOL/L (ref 3.5–5.2)
SODIUM SERPL-SCNC: 139 MMOL/L (ref 134–144)

## 2023-11-21 RX ORDER — METHYLPREDNISOLONE 4 MG/1
TABLET ORAL
Qty: 21 TABLET | Refills: 0 | OUTPATIENT
Start: 2023-11-21

## 2023-12-28 RX ORDER — VALSARTAN 320 MG/1
TABLET ORAL
Qty: 90 TABLET | Refills: 0 | Status: SHIPPED | OUTPATIENT
Start: 2023-12-28

## 2024-01-09 DIAGNOSIS — S32.020A COMPRESSION FRACTURE OF L2 VERTEBRA, INITIAL ENCOUNTER: ICD-10-CM

## 2024-01-09 DIAGNOSIS — M85.89 OSTEOPENIA OF MULTIPLE SITES: Primary | ICD-10-CM

## 2024-01-12 ENCOUNTER — INFUSION (OUTPATIENT)
Dept: ONCOLOGY | Facility: HOSPITAL | Age: 69
End: 2024-01-12
Payer: COMMERCIAL

## 2024-01-12 ENCOUNTER — LAB (OUTPATIENT)
Dept: OTHER | Facility: HOSPITAL | Age: 69
End: 2024-01-12
Payer: COMMERCIAL

## 2024-01-12 VITALS — TEMPERATURE: 98 F

## 2024-01-12 DIAGNOSIS — S32.020A COMPRESSION FRACTURE OF L2 VERTEBRA, INITIAL ENCOUNTER: ICD-10-CM

## 2024-01-12 DIAGNOSIS — S32.020A COMPRESSION FRACTURE OF L2 VERTEBRA, INITIAL ENCOUNTER: Primary | ICD-10-CM

## 2024-01-12 DIAGNOSIS — M85.89 OSTEOPENIA OF MULTIPLE SITES: ICD-10-CM

## 2024-01-12 LAB
25(OH)D3 SERPL-MCNC: 27.7 NG/ML (ref 30–100)
ALBUMIN SERPL-MCNC: 4.4 G/DL (ref 3.5–5.2)
ALBUMIN/GLOB SERPL: 1.6 G/DL
ALP SERPL-CCNC: 75 U/L (ref 39–117)
ALT SERPL W P-5'-P-CCNC: 18 U/L (ref 1–33)
ANION GAP SERPL CALCULATED.3IONS-SCNC: 10.4 MMOL/L (ref 5–15)
AST SERPL-CCNC: 17 U/L (ref 1–32)
BILIRUB SERPL-MCNC: 0.5 MG/DL (ref 0–1.2)
BUN SERPL-MCNC: 11 MG/DL (ref 8–23)
BUN/CREAT SERPL: 11.7 (ref 7–25)
CALCIUM SPEC-SCNC: 9.5 MG/DL (ref 8.6–10.5)
CHLORIDE SERPL-SCNC: 101 MMOL/L (ref 98–107)
CO2 SERPL-SCNC: 26.6 MMOL/L (ref 22–29)
CREAT SERPL-MCNC: 0.94 MG/DL (ref 0.57–1)
EGFRCR SERPLBLD CKD-EPI 2021: 66.2 ML/MIN/1.73
GLOBULIN UR ELPH-MCNC: 2.8 GM/DL
GLUCOSE SERPL-MCNC: 102 MG/DL (ref 65–99)
MAGNESIUM SERPL-MCNC: 2.1 MG/DL (ref 1.6–2.4)
PHOSPHATE SERPL-MCNC: 3.3 MG/DL (ref 2.5–4.5)
POTASSIUM SERPL-SCNC: 4.7 MMOL/L (ref 3.5–5.2)
PROT SERPL-MCNC: 7.2 G/DL (ref 6–8.5)
SODIUM SERPL-SCNC: 138 MMOL/L (ref 136–145)

## 2024-01-12 PROCEDURE — 82306 VITAMIN D 25 HYDROXY: CPT | Performed by: PHYSICIAN ASSISTANT

## 2024-01-12 PROCEDURE — 83735 ASSAY OF MAGNESIUM: CPT | Performed by: PHYSICIAN ASSISTANT

## 2024-01-12 PROCEDURE — 25010000002 DENOSUMAB 60 MG/ML SOLUTION PREFILLED SYRINGE: Performed by: PHYSICIAN ASSISTANT

## 2024-01-12 PROCEDURE — 84100 ASSAY OF PHOSPHORUS: CPT | Performed by: PHYSICIAN ASSISTANT

## 2024-01-12 PROCEDURE — 80053 COMPREHEN METABOLIC PANEL: CPT | Performed by: PHYSICIAN ASSISTANT

## 2024-01-12 PROCEDURE — 96372 THER/PROPH/DIAG INJ SC/IM: CPT

## 2024-01-12 RX ADMIN — DENOSUMAB 60 MG: 60 INJECTION SUBCUTANEOUS at 09:11

## 2024-01-12 NOTE — NURSING NOTE
Arrived  for prolia injection. Indication and side effects reviewed. Denies recent dental work. Labs and medications verified. Prolia administered in  right arm without incidence. Instructed to call prescribing MD for any concerns or questions and instructed on how to schedule future appts.  Pt vu and discharged in stable condition.

## 2024-02-25 RX ORDER — EZETIMIBE 10 MG/1
TABLET ORAL
Qty: 90 TABLET | Refills: 1 | Status: SHIPPED | OUTPATIENT
Start: 2024-02-25

## 2024-02-28 ENCOUNTER — TELEPHONE (OUTPATIENT)
Dept: FAMILY MEDICINE CLINIC | Facility: CLINIC | Age: 69
End: 2024-02-28

## 2024-02-28 DIAGNOSIS — Z72.0 TOBACCO ABUSE: Primary | Chronic | ICD-10-CM

## 2024-02-28 NOTE — TELEPHONE ENCOUNTER
Caller: [facility] [employer]CIAL [employer]    Relationship to patient:     Best call back number: [phone]    Patient is needing: CALLING REGARDING CLEARANCE FOR A REFERRALFOR THE CT LOW DOSE. PLEASE SCHEDULE HER FURTHER OUT FOR A CT LOW DOSE FOR INSURANCE PURPOSES

## 2024-02-28 NOTE — TELEPHONE ENCOUNTER
Called and spoke with Laura.  She needs a new order put in for pt for CT Low Dose.  Pt's auth is going to  on 3/12/2024 and her appt will not be until after that date.  Can you please put in new order?

## 2024-03-08 ENCOUNTER — PRIOR AUTHORIZATION (OUTPATIENT)
Dept: FAMILY MEDICINE CLINIC | Facility: CLINIC | Age: 69
End: 2024-03-08
Payer: COMMERCIAL

## 2024-03-15 DIAGNOSIS — I65.23 BILATERAL CAROTID ARTERY STENOSIS: Primary | ICD-10-CM

## 2024-03-15 DIAGNOSIS — I73.9 PERIPHERAL VASCULAR DISEASE, UNSPECIFIED: ICD-10-CM

## 2024-03-15 DIAGNOSIS — I72.4 ANEURYSM OF ARTERY OF LOWER EXTREMITY: ICD-10-CM

## 2024-03-19 RX ORDER — FOLIC ACID 1 MG/1
1000 TABLET ORAL DAILY
Qty: 90 TABLET | Refills: 1 | Status: SHIPPED | OUTPATIENT
Start: 2024-03-19

## 2024-03-25 RX ORDER — VALSARTAN 320 MG/1
TABLET ORAL
Qty: 90 TABLET | Refills: 0 | Status: SHIPPED | OUTPATIENT
Start: 2024-03-25

## 2024-04-11 ENCOUNTER — HOSPITAL ENCOUNTER (OUTPATIENT)
Dept: CT IMAGING | Facility: HOSPITAL | Age: 69
Discharge: HOME | End: 2024-04-11
Admitting: PHYSICIAN ASSISTANT
Payer: COMMERCIAL

## 2024-04-11 PROCEDURE — 71271 CT THORAX LUNG CANCER SCR C-: CPT

## 2024-04-16 DIAGNOSIS — Z72.0 TOBACCO ABUSE: Primary | ICD-10-CM

## 2024-05-03 ENCOUNTER — OFFICE VISIT (OUTPATIENT)
Dept: CARDIOLOGY | Facility: CLINIC | Age: 69
End: 2024-05-03
Payer: COMMERCIAL

## 2024-05-03 VITALS
HEIGHT: 67 IN | WEIGHT: 166 LBS | BODY MASS INDEX: 26.06 KG/M2 | SYSTOLIC BLOOD PRESSURE: 138 MMHG | HEART RATE: 69 BPM | DIASTOLIC BLOOD PRESSURE: 80 MMHG

## 2024-05-03 DIAGNOSIS — I47.10 PSVT (PAROXYSMAL SUPRAVENTRICULAR TACHYCARDIA): ICD-10-CM

## 2024-05-03 DIAGNOSIS — I10 ESSENTIAL HYPERTENSION: Primary | ICD-10-CM

## 2024-05-03 DIAGNOSIS — E78.2 MIXED HYPERLIPIDEMIA: ICD-10-CM

## 2024-05-03 DIAGNOSIS — I70.0 AORTIC ATHEROSCLEROSIS: Chronic | ICD-10-CM

## 2024-05-03 DIAGNOSIS — I49.1 APC (ATRIAL PREMATURE CONTRACTIONS): ICD-10-CM

## 2024-05-03 DIAGNOSIS — I73.9 PAD (PERIPHERAL ARTERY DISEASE): Chronic | ICD-10-CM

## 2024-05-03 DIAGNOSIS — R07.2 PRECORDIAL PAIN: ICD-10-CM

## 2024-05-03 DIAGNOSIS — I25.10 CORONARY ARTERY CALCIFICATION SEEN ON CT SCAN: ICD-10-CM

## 2024-05-03 PROCEDURE — 99214 OFFICE O/P EST MOD 30 MIN: CPT | Performed by: INTERNAL MEDICINE

## 2024-05-03 RX ORDER — IMIQUIMOD 12.5 MG/.25G
1 CREAM TOPICAL
COMMUNITY

## 2024-05-03 RX ORDER — NYSTATIN 100000 U/G
1 OINTMENT TOPICAL
COMMUNITY

## 2024-05-03 NOTE — PROGRESS NOTES
Subjective:     Encounter Date: 05/03/24        Patient ID: Nelsy Stokes is a 69 y.o. female.    Chief Complaint: SOB  History of Present Illness    Dear Dr. Leong,    I had the pleasure of seeing this patient in the office today for f/u.  She has a history of fairly diffuse peripheral arterial disease.  She has bilateral cerebrovascular disease, subclavian artery disease with subclavian steal syndrome, abdominal aortic moderate to severe arteriosclerotic disease and coronary artery calcification.  She also had a stress test that was borderline abnormal.    Lately she states she started to notice some mid precordial chest pressure when she is walking the treadmill.  Has not felt that before.  Does get dyspneic with activity as well.  She has been noticing some redness on both of her feet when she stands up and she does have some claudication symptoms.  Cannot walk on a treadmill very long or very fast because of that.    She continues to smoke but she is working hard to stop.  She is down to half a pack a day, although she is concerned that she is gaining weight.      The following portions of the patient's history were reviewed and updated as appropriate: allergies, current medications, past family history, past medical history, past social history, past surgical history and problem list.    Past Medical History:   Diagnosis Date    Anxiety     Aortic atherosclerosis     APC (atrial premature contractions) 10/14/2022    Arthritis     Asthma     Borderline diabetes     Carotid artery stenosis     left    Compression fracture of L2 lumbar vertebra     COPD (chronic obstructive pulmonary disease)     Coronary artery disease     GERD (gastroesophageal reflux disease)     History of bradycardia     History of chest pain     ECHO AND STRESS TEST IN EPIC FROM 2/2020    History of colon polyps     History of COVID-19 11/01/2022    Hyperlipidemia     Hypertension     Low back pain     Lumbosacral disc disease      "PONV (postoperative nausea and vomiting)     PVD (peripheral vascular disease)     Subclavian artery stenosis     LEFT    Tobacco abuse        Past Surgical History:   Procedure Laterality Date    CAROTID STENT Right 2018    CHEST WALL BIOPSY  09/2022    CHOLECYSTECTOMY WITH INTRAOPERATIVE CHOLANGIOGRAM N/A 11/18/2020    Procedure: LAPAROSCOPIC CHOLECYSTECTOMY WITH INTRAOPERATIVE CHOLANGIOGRAM, OPEN UMBILICAL HERNIA REPAIR;  Surgeon: Maria D Ariza MD;  Location: [facility] MAIN OR;  Service: General;  Laterality: N/A;    COLONOSCOPY N/A 2017    ENDOSCOPY N/A 10/09/2020    Procedure: ESOPHAGOGASTRODUODENOSCOPY WITH COLD BIOPSIES;  Surgeon: Jacque Huber MD;  Location: [facility] ENDOSCOPY;  Service: Gastroenterology;  Laterality: N/A;  PRE: REFLUX  POST: ESOPHAGITIS, GASTRITIS, DUODENITIS    EXTRACORPOREAL SHOCK WAVE LITHOTRIPSY (ESWL)  09/09/2020    S[facility]ERY [facility] IN [place], [place]    KNEE CARTILAGE SURGERY Right 1990s    KYPHOPLASTY Bilateral 02/14/2023    Procedure: Lumbar 2 lumbar 3 kyphoplasty;  Surgeon: Ramon Kay MD;  Location: [facility] HYBRID OR 18/19;  Service: Neurosurgery;  Laterality: Bilateral;    VERTEBROPLASTY  2023           Procedures       Objective:     Vitals:    05/03/24 1105 05/03/24 1349   BP: 140/80 138/80   Pulse: 69    Weight: 75.3 kg (166 lb)    Height: 170.2 cm (67\")          Physical Exam  Constitutional:       General: She is not in acute distress.     Appearance: She is well-developed. She is not diaphoretic.   HENT:      Head: Normocephalic and atraumatic.      Nose: Nose normal.   Eyes:      General:         Right eye: No discharge.         Left eye: No discharge.      Conjunctiva/sclera: Conjunctivae normal.      Pupils: Pupils are equal, round, and reactive to light.   Neck:      Thyroid: No thyromegaly.      Trachea: No tracheal deviation.   Cardiovascular:      Rate and Rhythm: Normal rate and regular rhythm.      Pulses: Normal pulses.      Heart sounds: Normal heart " sounds, S1 normal and S2 normal.      No S3 sounds.   Pulmonary:      Effort: Pulmonary effort is normal. No respiratory distress.      Breath sounds: Normal breath sounds. No stridor.   Chest:      Chest wall: No tenderness.   Abdominal:      General: Bowel sounds are normal. There is no distension.      Palpations: Abdomen is soft. There is no mass.      Tenderness: There is no abdominal tenderness. There is no guarding or rebound.   Musculoskeletal:         General: No tenderness or deformity. Normal range of motion.      Cervical back: Normal range of motion and neck supple.   Lymphadenopathy:      Cervical: No cervical adenopathy.   Skin:     General: Skin is warm and dry.      Findings: No erythema or rash.   Neurological:      Mental Status: She is alert and oriented to person, place, and time.      Deep Tendon Reflexes: Reflexes are normal and symmetric.   [facility]:         Thought Content: Thought content normal.         Lab Review:             Lab Results   Component Value Date    GLUCOSE 102 (H) 01/12/2024    BUN 11 01/12/2024    CREATININE 0.94 01/12/2024    EGFRIFNONA 54 (L) 01/05/2022    EGFRIFAFRI 62 01/05/2022    BCR 11.7 01/12/2024    K 4.7 01/12/2024    CO2 26.6 01/12/2024    CALCIUM 9.5 01/12/2024    PROTENTOTREF 7.0 10/24/2023    ALBUMIN 4.4 01/12/2024    LABIL2 2.3 10/24/2023    AST 17 01/12/2024    ALT 18 01/12/2024         Stress 2/2020  Interpretation Summary    Myocardial perfusion imaging indicates a very small amount of mild ischemia in the distal inferior wall.  Left ventricular ejection fraction is hyperdynamic (Calculated EF > 70%). The left ventricle is small.  Impressions are consistent with a low risk study.        Results for orders placed during the hospital encounter of 05/17/23    Adult Transthoracic Echo Complete w/ Color, Spectral and Contrast if Necessary Per Protocol    Interpretation Summary    Left ventricular systolic function is normal. Calculated left ventricular EF =  65%    Left ventricular diastolic function was normal.          Assessment:          Diagnosis Plan   1. Essential hypertension  Stress Test With Myocardial Perfusion One Day      2. Aortic atherosclerosis  Stress Test With Myocardial Perfusion One Day      3. APC (atrial premature contractions)  Stress Test With Myocardial Perfusion One Day      4. Mixed hyperlipidemia  Stress Test With Myocardial Perfusion One Day      5. PAD (peripheral artery disease)  Stress Test With Myocardial Perfusion One Day      6. PSVT (paroxysmal supraventricular tachycardia)  Stress Test With Myocardial Perfusion One Day      7. Coronary artery calcification seen on CT scan  Stress Test With Myocardial Perfusion One Day      8. Precordial pain  Stress Test With Myocardial Perfusion One Day               Plan:       1. SVT- cont current medical therapy  2.  Tobacco abuse-smoking cessation is recommended  3.  Peripheral vascular disease-risk factor modification and medical therapy  4. Aortic arteriosclerosis  5.  Coronary calcification, prior abnormal stress test with a very small amount of ischemia, now with new/worsening precordial chest discomfort with activity-has been 3 and half years since her last stress test, we will arrange for stress Cardiolite, pharmacologic given her inability to ambulate on a treadmill due to PAD.    Thank you very much for allowing us to participate in the care of this pleasant patient.  Please don't hesitate to call if I can be of assistance in any way.      Current Outpatient Medications:     albuterol sulfate  (90 Base) MCG/ACT inhaler, Inhale 2 puffs Every 4 (Four) Hours As Needed for Wheezing., Disp: 24 g, Rfl: 3    aspirin 81 MG EC tablet, Take 1 tablet by mouth Daily. TO HOLD 5 DAYS PRIOR TO OR PER MD INSTRUCTIONS, Disp: , Rfl:     Cholecalciferol 50 MCG (2000 UT) capsule, One PO daily (Patient taking differently: Take 1 capsule by mouth 2 (Two) Times a Week.), Disp: 90 each, Rfl: 3     Cyanocobalamin (B-12) 1000 MCG lozenge, PLACE ONE TABLET UNDER THE TONGUE DAILY (Patient taking differently: Place  under the tongue Every Other Day.), Disp: 90 lozenge, Rfl: 1    Denosumab (PROLIA SC), Inject  under the skin into the appropriate area as directed Every 6 (Six) Months., Disp: , Rfl:     dilTIAZem CD (CARDIZEM CD) 120 MG 24 hr capsule, TAKE ONE CAPSULE BY MOUTH DAILY, Disp: 90 capsule, Rfl: 2    ezetimibe (ZETIA) 10 MG tablet, TAKE ONE TABLET BY MOUTH DAILY FOR CHOLESTEROL, Disp: 90 tablet, Rfl: 1    Fluticasone-Umeclidin-Vilant (Trelegy Ellipta) 100-62.5-25 MCG/INH inhaler, Inhale 1 puff Daily., Disp: 60 each, Rfl: 5    folic acid (FOLVITE) 1 MG tablet, TAKE 1 TABLET BY MOUTH DAILY, Disp: 90 tablet, Rfl: 1    gabapentin (NEURONTIN) 300 MG capsule, Take 1 capsule by mouth 3 (Three) Times a Day. For pain, Disp: 90 capsule, Rfl: 5    imiquimod (ALDARA) 5 % cream, Apply 1 Application topically to the appropriate area as directed., Disp: , Rfl:     metFORMIN ER (GLUCOPHAGE-XR) 500 MG 24 hr tablet, 2 PO daily with food for impaired fasting glucose, Disp: 180 tablet, Rfl: 3    nystatin (MYCOSTATIN) 637816 UNIT/GM ointment, Apply 1 Application topically to the appropriate area as directed., Disp: , Rfl:     pantoprazole (Protonix) 40 MG EC tablet, One PO daily for GERD (Patient taking differently: Take 1 tablet by mouth As Needed. One PO daily for GERD), Disp: 90 tablet, Rfl: 1    Pitavastatin Calcium (Livalo) 4 MG tablet, , Disp: , Rfl:     valsartan (DIOVAN) 320 MG tablet, TAKE ONE TABLET BY MOUTH DAILY FOR BLOOD PRESSURE, Disp: 90 tablet, Rfl: 0    lidocaine (LIDODERM) 5 %, Place 1 patch on the skin as directed by provider Daily. Remove & Discard patch within 12 hours or as directed by MD, Disp: 30 patch, Rfl: 0    methocarbamol (ROBAXIN) 750 MG tablet, Take 1 tablet by mouth 3 (Three) Times a Day As Needed for Muscle Spasms., Disp: 30 tablet, Rfl: 0    polyethylene glycol (MIRALAX) 17 GM/SCOOP powder,  Take 17 g by mouth Daily., Disp: 507 g, Rfl: 0         EMR Dragon/Transcription disclaimer:    Much of this encounter note is an electronic transcription/translation of spoken language to printed text. The electronic translation of spoken language may permit erroneous, or at times, nonsensical words or phrases to be inadvertently transcribed; Although I have reviewed the note for such errors, some may still exist.

## 2024-05-10 ENCOUNTER — TELEPHONE (OUTPATIENT)
Dept: CARDIOLOGY | Facility: CLINIC | Age: 69
End: 2024-05-10
Payer: COMMERCIAL

## 2024-05-13 ENCOUNTER — HOSPITAL ENCOUNTER (OUTPATIENT)
Dept: CARDIOLOGY | Facility: HOSPITAL | Age: 69
Discharge: HOME | End: 2024-05-13
Payer: COMMERCIAL

## 2024-05-13 VITALS — WEIGHT: 166.01 LBS | HEIGHT: 67 IN | BODY MASS INDEX: 26.06 KG/M2

## 2024-05-13 DIAGNOSIS — I70.0 AORTIC ATHEROSCLEROSIS: Chronic | ICD-10-CM

## 2024-05-13 DIAGNOSIS — I47.10 PSVT (PAROXYSMAL SUPRAVENTRICULAR TACHYCARDIA): ICD-10-CM

## 2024-05-13 DIAGNOSIS — R07.2 PRECORDIAL PAIN: ICD-10-CM

## 2024-05-13 DIAGNOSIS — I49.1 APC (ATRIAL PREMATURE CONTRACTIONS): ICD-10-CM

## 2024-05-13 DIAGNOSIS — I73.9 PAD (PERIPHERAL ARTERY DISEASE): Chronic | ICD-10-CM

## 2024-05-13 DIAGNOSIS — I10 ESSENTIAL HYPERTENSION: ICD-10-CM

## 2024-05-13 DIAGNOSIS — I25.10 CORONARY ARTERY CALCIFICATION SEEN ON CT SCAN: ICD-10-CM

## 2024-05-13 DIAGNOSIS — E78.2 MIXED HYPERLIPIDEMIA: ICD-10-CM

## 2024-05-13 LAB
BH CV NUCLEAR PRIOR STUDY: 1
BH CV REST NUCLEAR ISOTOPE DOSE: 10.6 MCI
BH CV STRESS BP STAGE 1: NORMAL
BH CV STRESS COMMENTS STAGE 1: NORMAL
BH CV STRESS DOSE REGADENOSON STAGE 1: 0.4
BH CV STRESS DURATION MIN STAGE 1: 0
BH CV STRESS DURATION SEC STAGE 1: 10
BH CV STRESS HR STAGE 1: 118
BH CV STRESS NUCLEAR ISOTOPE DOSE: 33.4 MCI
BH CV STRESS PROTOCOL 1: NORMAL
BH CV STRESS RECOVERY BP: NORMAL MMHG
BH CV STRESS RECOVERY HR: 71 BPM
BH CV STRESS STAGE 1: 1
LV EF NUC BP: 70 %
MAXIMAL PREDICTED HEART RATE: 151 BPM
PERCENT MAX PREDICTED HR: 78.15 %
STRESS BASELINE BP: NORMAL MMHG
STRESS BASELINE HR: 71 BPM
STRESS PERCENT HR: 92 %
STRESS POST EXERCISE DUR SEC: 10 SEC
STRESS POST PEAK BP: NORMAL MMHG
STRESS POST PEAK HR: 118 BPM
STRESS TARGET HR: 128 BPM

## 2024-05-13 PROCEDURE — 93017 CV STRESS TEST TRACING ONLY: CPT

## 2024-05-13 PROCEDURE — 93018 CV STRESS TEST I&R ONLY: CPT | Performed by: INTERNAL MEDICINE

## 2024-05-13 PROCEDURE — 25010000002 REGADENOSON 0.4 MG/5ML SOLUTION: Performed by: INTERNAL MEDICINE

## 2024-05-13 PROCEDURE — 78452 HT MUSCLE IMAGE SPECT MULT: CPT

## 2024-05-13 PROCEDURE — 93016 CV STRESS TEST SUPVJ ONLY: CPT | Performed by: INTERNAL MEDICINE

## 2024-05-13 PROCEDURE — 78452 HT MUSCLE IMAGE SPECT MULT: CPT | Performed by: INTERNAL MEDICINE

## 2024-05-13 PROCEDURE — 0 TECHNETIUM TETROFOSMIN KIT: Performed by: INTERNAL MEDICINE

## 2024-05-13 PROCEDURE — A9502 TC99M TETROFOSMIN: HCPCS | Performed by: INTERNAL MEDICINE

## 2024-05-13 RX ORDER — REGADENOSON 0.08 MG/ML
0.4 INJECTION, SOLUTION INTRAVENOUS
Status: COMPLETED | OUTPATIENT
Start: 2024-05-13 | End: 2024-05-13

## 2024-05-13 RX ADMIN — TETROFOSMIN 1 DOSE: 1.38 INJECTION, POWDER, LYOPHILIZED, FOR SOLUTION INTRAVENOUS at 08:23

## 2024-05-13 RX ADMIN — TETROFOSMIN 1 DOSE: 1.38 INJECTION, POWDER, LYOPHILIZED, FOR SOLUTION INTRAVENOUS at 07:37

## 2024-05-13 RX ADMIN — REGADENOSON 0.4 MG: 0.08 INJECTION, SOLUTION INTRAVENOUS at 08:23

## 2024-05-14 ENCOUNTER — TELEPHONE (OUTPATIENT)
Dept: CARDIOLOGY | Facility: HOSPITAL | Age: 69
End: 2024-05-14
Payer: COMMERCIAL

## 2024-05-14 NOTE — TELEPHONE ENCOUNTER
Notified patient of results/recommendations. Patient verbalized understanding. FU scheduled.     Rylie Crow RN  Triage [facility] [place]

## 2024-05-14 NOTE — TELEPHONE ENCOUNTER
Attempted to contact patient. Voicemail left requesting a call back for the results. Will continue to try and reach patient.      Laura Garcia RN  Triage [facility] [place]

## 2024-05-16 ENCOUNTER — TELEPHONE (OUTPATIENT)
Dept: CARDIOLOGY | Facility: CLINIC | Age: 69
End: 2024-05-16
Payer: COMMERCIAL

## 2024-05-16 NOTE — TELEPHONE ENCOUNTER
Pt called in to triage.  States that she has been having swelling in her hands and feet especially when traveling or walking and feet are dependent.  Pt states that SOA is baseline for her COPD, and weight is up maybe 2 lb in the last month.  Pt also states that she has been experiencing light chest pressure when walking on her treadmill for a couple of months.  Pt states that she does watch her salt intake.  Pt states that Dr. Bueno was aware of all of this when she saw him 5/3/24, and wanted to do stress test before treating swelling with any diuretics (PCP has given her these in past, but only one time, and only 3 pills).  Stress test done and normal per Dr. Bueno note.  BP/-130's/70-80, 70's.    Recommendations?    Thanks so much,  Yolie White, RN  Triage RN  05/16/24 10:37 EDT

## 2024-05-17 NOTE — TELEPHONE ENCOUNTER
Please schedule an appointment for me to see her in the office and we can discuss treatment of the swelling.  Thank you

## 2024-05-17 NOTE — TELEPHONE ENCOUNTER
Reviewed recommendations with patient, verbalized understanding, will call with any further questions or complaints.    Diane- I offered to schedule pt an appointment to see you, but pt states that she just saw Dr. Bueno, and does not want to come back in for another appointment.  She states that if you don't feel like she needs additional diuretics, she is fine with that.  I did recommend that if swelling got worse, to please call back in to office.    Yolie White, RN  Triage Nurse  05/17/24 12:44 EDT

## 2024-05-28 RX ORDER — DILTIAZEM HYDROCHLORIDE 120 MG/1
120 CAPSULE, EXTENDED RELEASE ORAL DAILY
Qty: 90 CAPSULE | Refills: 3 | OUTPATIENT
Start: 2024-05-28

## 2024-05-30 ENCOUNTER — OFFICE VISIT (OUTPATIENT)
Dept: FAMILY MEDICINE CLINIC | Facility: CLINIC | Age: 69
End: 2024-05-30
Payer: COMMERCIAL

## 2024-05-30 VITALS
HEIGHT: 67 IN | DIASTOLIC BLOOD PRESSURE: 82 MMHG | WEIGHT: 167.4 LBS | OXYGEN SATURATION: 96 % | SYSTOLIC BLOOD PRESSURE: 120 MMHG | RESPIRATION RATE: 16 BRPM | BODY MASS INDEX: 26.27 KG/M2 | HEART RATE: 79 BPM | TEMPERATURE: 96.6 F

## 2024-05-30 DIAGNOSIS — I65.23 BILATERAL CAROTID ARTERY STENOSIS: Chronic | ICD-10-CM

## 2024-05-30 DIAGNOSIS — E78.2 MIXED HYPERLIPIDEMIA: ICD-10-CM

## 2024-05-30 DIAGNOSIS — E53.8 LOW FOLIC ACID: ICD-10-CM

## 2024-05-30 DIAGNOSIS — I77.1 SUBCLAVIAN ARTERIAL STENOSIS: Chronic | ICD-10-CM

## 2024-05-30 DIAGNOSIS — I10 ESSENTIAL HYPERTENSION: Primary | ICD-10-CM

## 2024-05-30 DIAGNOSIS — Z72.0 TOBACCO ABUSE: Chronic | ICD-10-CM

## 2024-05-30 DIAGNOSIS — R73.01 IMPAIRED FASTING GLUCOSE: ICD-10-CM

## 2024-05-30 DIAGNOSIS — K21.00 GASTROESOPHAGEAL REFLUX DISEASE WITH ESOPHAGITIS WITHOUT HEMORRHAGE: ICD-10-CM

## 2024-05-30 DIAGNOSIS — R31.21 ASYMPTOMATIC MICROSCOPIC HEMATURIA: ICD-10-CM

## 2024-05-30 DIAGNOSIS — I73.9 PAD (PERIPHERAL ARTERY DISEASE): Chronic | ICD-10-CM

## 2024-05-30 PROCEDURE — 99214 OFFICE O/P EST MOD 30 MIN: CPT | Performed by: PHYSICIAN ASSISTANT

## 2024-05-30 RX ORDER — METFORMIN HYDROCHLORIDE 500 MG/1
TABLET, EXTENDED RELEASE ORAL
Qty: 90 TABLET | Refills: 3 | Status: SHIPPED | OUTPATIENT
Start: 2024-05-30

## 2024-05-30 RX ORDER — PITAVASTATIN CALCIUM 2.09 MG/1
2 TABLET, FILM COATED ORAL NIGHTLY
Qty: 90 TABLET | Refills: 3 | Status: SHIPPED | OUTPATIENT
Start: 2024-05-30

## 2024-05-30 RX ORDER — PANTOPRAZOLE SODIUM 40 MG/1
TABLET, DELAYED RELEASE ORAL
Qty: 90 TABLET | Refills: 1 | Status: SHIPPED | OUTPATIENT
Start: 2024-05-30

## 2024-05-30 RX ORDER — EZETIMIBE 10 MG/1
10 TABLET ORAL DAILY
Qty: 90 TABLET | Refills: 3 | Status: SHIPPED | OUTPATIENT
Start: 2024-05-30

## 2024-05-30 RX ORDER — CHOLECALCIFEROL (VITAMIN D3) 25 MCG
1 TABLET,CHEWABLE ORAL DAILY
Qty: 90 LOZENGE | Refills: 1 | Status: SHIPPED | OUTPATIENT
Start: 2024-05-30

## 2024-05-30 RX ORDER — VALSARTAN AND HYDROCHLOROTHIAZIDE 160; 12.5 MG/1; MG/1
1 TABLET, FILM COATED ORAL DAILY
Qty: 90 TABLET | Refills: 1 | Status: SHIPPED | OUTPATIENT
Start: 2024-05-30

## 2024-05-30 NOTE — PROGRESS NOTES
"Subjective   Nelsy Stokes is a 69 y.o. female.     History of Present Illness    Since the last visit, she has overall felt fairly well.  She has Primary Hypertension and well controlled on current medication, Impaired fasting glucose and will monitor labs to watch for DMII, GERD controlled on PPI Rx, CAD and remains under care of their Cardiologist for mangement, Vitamin D deficiency and will update labs for continued management, and mixed hyperlipidemia need to update LDL to check if goal met with medication .  she has been compliant with current medications have reviewed them.  The patient denies medication side effects.  Will refill medications. /70   Pulse 79   Temp 96.6 °F (35.9 °C) (Temporal)   Resp 16   Ht 170.2 cm (67.01\")   Wt 75.9 kg (167 lb 6.4 oz)   LMP  (LMP Unknown)   SpO2 96%   BMI 26.21 kg/m² .  BMI is >= 25 and <30. (Overweight) The following options were offered after discussion;: weight loss educational material (shared in after visit summary), exercise counseling/recommendations, and nutrition counseling/recommendations    Sees derm ---Darshana OLIVER    Results for orders placed or performed during the hospital encounter of 05/13/24   Stress Test With Myocardial Perfusion One Day   Result Value Ref Range    Nuclear Prior Study 1.0     BH CV REST NUCLEAR ISOTOPE DOSE 10.6 mCi    BH CV STRESS NUCLEAR ISOTOPE DOSE 33.4 mCi    BH CV STRESS PROTOCOL 1 Pharmacologic     Stage 1 1.0     HR Stage 1 118     BP Stage 1 185/93     Duration Min Stage 1 0     Duration Sec Stage 1 10     Stress Dose Regadenoson Stage 1 0.40     Stress Comments Stage 1 10 sec bolus injection     Baseline HR 71 bpm    Baseline /93 mmHg    Peak  bpm    Peak /93 mmHg    Recovery HR 71 bpm    Recovery /95 mmHg    Target HR (85%) 128 bpm    Max. Pred. HR (100%) 151 bpm    Percent Max Pred HR 78.15 %    Exercise duration (sec) 10 sec    Percent Target HR 92 %    Nuc Stress EF 70 % "     Lab Results   Component Value Date    GLUCOSE 102 (H) 01/12/2024    BUN 11 01/12/2024    CREATININE 0.94 01/12/2024    EGFRRESULT 64 11/09/2023    EGFR 66.2 01/12/2024    BCR 11.7 01/12/2024    K 4.7 01/12/2024    CO2 26.6 01/12/2024    CALCIUM 9.5 01/12/2024    PROTENTOTREF 7.0 10/24/2023    ALBUMIN 4.4 01/12/2024    BILITOT 0.5 01/12/2024    AST 17 01/12/2024    ALT 18 01/12/2024   Edema of hands and feet for mos  I do have her on metformin for treatment of impaired fasting glucose not type 2 diabetes she does not tolerate 2 tabs but she is doing well with 1 tab daily and I plan to update her hemoglobin A1c.    Reviewed notes from cardiology Dr. Bueno and saw her on 5/3/2024 for evaluation per recommendation of her vascular surgeon Dr. Leong.  He noted her history of fairly diffuse peripheral artery disease and bilateral cardiovascular disease, subclavian artery disease with subclavian steal syndrome, abdominal aorta moderate to severe arteriosclerotic disease, and coronary artery calcification.  Noted he had a stress test that was borderline abnormal.  Also noted some mid precordial chest pressure that is new.  She continues to smoke.  Exam findings negative cardiovascular.  Also noted her history of SVT and to continue medical therapy.  Ordered a Cardiolite stress test chemical study.---Regadenoson study was performed on 5/13/2024..... Dr. Valera read the study noting myocardial perfusion imaging indicates a normal myocardial perfusion study with no evidence of ischemia.  Impressions are consistent with low risk study.    Had follow-up with Ortho Dr. Butcher reviewed note from 2/21/2024 following up on left shoulder noting      Left shoulder x-rays taken today in the office show: Nondisplaced proximal humerus fracture with no interval displacement. Does appear to be some interval healing, can faintly make out where the fracture line was, and seems to be more around the greater tuberosity  MRI left shoulder done at  [facility] on 2/29/2024, images were reviewed and independently interpreted by me and show: Greater tuberosity fracture still with inflammation and healing. Partial-thickness supraspinatus tear without full-thickness tear or retraction. Tendinitis and split tear along the biceps tendon. Circumferential labral partial-thickness tearing.     Concluded at this visit patient was improving and to follow-up as needed.--- This fracture occurred when she had a fall off the porch and was seen in the ER 10/31/2023 also noting she hit her head during the fall and the CT of the head at the ER was negative--no bleed   Patient also sees pulmonologist Dr DELGADO Bosch for COPD and remains on inhaled therapy reviewed last notes from 5/6/2024 encounter.  Also noting he started her on NicoDerm . and Wellbutrin to help her quit smoking  Also noting patient did see Dr. Norton for evaluation of the microscopic hematuria and history of renal stones with evaluation done on 10/2/2023.  Ordered chest x-ray last visit on 10/4/2023 she was having cough that I suspected was from GERD.  I restarted her PPI at that visit  LAST visit neurosurgeon Dr Kay---6-21-23  History of Present Illness: Nelsy Stokes is a 68 y.o. female is here today for follow-up on back and right leg pain. She is s/p L2-L3 Kyphoplasty 2/14//23. MRI Lumbar done 6/19/23. Today, she states she is doing well.  She completed to [facility] and takes Gabapentin at night which has helped with the pain.  She reports she has had a significant improvement in her lower back pain and lower extremity pain.  Denies any recent changes in strength or sensation  Also note patient has osteoporosis and remains on Prolia check bone density every 2 years  Continues to see Dr. Leong for PAD  As noted on prior visit was on Plavix and due to extensive bruising had to be discontinued.  4-11-24  Had low dose CT chest----Conclusion: Stable sub-6 mm pulmonary nodules. Heavy coronary artery calcification.--have  order for yearly    Last bone density was 8/29/2023 noting osteopenia in the hips and forearm      Walking on treadmill    The following portions of the patient's history were reviewed and updated as appropriate: allergies, current medications, past family history, past medical history, past social history, past surgical history, and problem list.    Review of Systems    Objective   Physical Exam      Assessment & Plan   Diagnoses and all orders for this visit:    1. Essential hypertension (Primary)    2. PAD (peripheral artery disease)    3. Bilateral carotid artery stenosis    4. Mixed hyperlipidemia    5. Impaired fasting glucose    6. Asymptomatic microscopic hematuria    7. Tobacco abuse    8. Gastroesophageal reflux disease with esophagitis without hemorrhage    9. Low folic acid    10. Subclavian arterial stenosis    Other orders  -     pitavastatin calcium (Livalo) 2 MG tablet tablet; Take 1 tablet by mouth Every Night. For cholesterol new lower dose  Dispense: 90 tablet; Refill: 3  -     valsartan-hydrochlorothiazide (Diovan HCT) 160-12.5 MG per tablet; Take 1 tablet by mouth Daily. For BP and stop taking valsartan 320 mg  Dispense: 90 tablet; Refill: 1      Plan, Nelsy Stokes, was seen today.  she was seen for HTN and add/adjust medication, Imparied fasting glucose and plan follow up labs, diet, and exercise, GERD and will continue on PPI medication, Hyperlipidemia and will continue current medication, CAD and is currently stable on medication management and stable, and Vitamin D deficiency and will update labs .    Labs are due  Update labs--she is on B12, D, folate  ----Livalo went down to 2 mg----had myalgia on 4mg---also on Zetia  I do advise she change to Repatha---declines   History of SVT remains on calcium channel blocker and sees Dr. Bueno cardiology at least yearly also for CAD  Under vascular surgeon  for peripheral vascular disease also cardiology and vascular monitor her subclavian  steal syndrome  She is taking metformin tolerating 1 tab daily for treatment of impaired fasting glucose will update A1c  Released by Ortho for the left shoulder humerus fracture  She is off the gabapentin for the low back pain doing well for now can restart if needed  Update lab  Continue Prolia for the osteoporosis  Patient declines mammogram  Bone density due again August 30, 2025  Wants to wait on colon cancer screening  Answers submitted by the patient for this visit:  Other (Submitted on 5/25/2024)  Please describe your symptoms.: discuss diuretics  Have you had these symptoms before?: Yes  How long have you been having these symptoms?: Greater than 2 weeks  Primary Reason for Visit (Submitted on 5/25/2024)  What is the primary reason for your visit?: Other  Get RSV vaccine  She has never had gout and not allergic to sulfa drugs I will lower her valsartan dose to 260 mg and add low-dose water pill for this edema and she needs to watch her blood pressure make sure systolic is staying above 115  F/u 6 mos

## 2024-05-31 DIAGNOSIS — M85.89 OSTEOPENIA OF MULTIPLE SITES: ICD-10-CM

## 2024-05-31 DIAGNOSIS — S32.020A COMPRESSION FRACTURE OF L2 VERTEBRA, INITIAL ENCOUNTER: ICD-10-CM

## 2024-06-04 LAB
25(OH)D3+25(OH)D2 SERPL-MCNC: 26.1 NG/ML (ref 30–100)
ALBUMIN SERPL-MCNC: 4.6 G/DL (ref 3.9–4.9)
ALBUMIN/GLOB SERPL: 2.1 {RATIO} (ref 1.2–2.2)
ALP SERPL-CCNC: 67 IU/L (ref 44–121)
ALT SERPL-CCNC: 21 IU/L (ref 0–32)
AST SERPL-CCNC: 15 IU/L (ref 0–40)
BASOPHILS # BLD AUTO: 0.1 X10E3/UL (ref 0–0.2)
BASOPHILS NFR BLD AUTO: 1 %
BILIRUB SERPL-MCNC: 0.4 MG/DL (ref 0–1.2)
BUN SERPL-MCNC: 12 MG/DL (ref 8–27)
BUN/CREAT SERPL: 13 (ref 12–28)
CALCIUM SERPL-MCNC: 9.5 MG/DL (ref 8.7–10.3)
CHLORIDE SERPL-SCNC: 96 MMOL/L (ref 96–106)
CHOLEST SERPL-MCNC: 222 MG/DL (ref 100–199)
CO2 SERPL-SCNC: 24 MMOL/L (ref 20–29)
CREAT SERPL-MCNC: 0.91 MG/DL (ref 0.57–1)
EGFRCR SERPLBLD CKD-EPI 2021: 68 ML/MIN/1.73
EOSINOPHIL # BLD AUTO: 0.1 X10E3/UL (ref 0–0.4)
EOSINOPHIL NFR BLD AUTO: 2 %
ERYTHROCYTE [DISTWIDTH] IN BLOOD BY AUTOMATED COUNT: 12.6 % (ref 11.7–15.4)
FOLATE SERPL-MCNC: >20 NG/ML
GLOBULIN SER CALC-MCNC: 2.2 G/DL (ref 1.5–4.5)
GLUCOSE SERPL-MCNC: 86 MG/DL (ref 70–99)
HBA1C MFR BLD: 6.4 % (ref 4.8–5.6)
HCT VFR BLD AUTO: 47.1 % (ref 34–46.6)
HDLC SERPL-MCNC: 62 MG/DL
HGB BLD-MCNC: 15.9 G/DL (ref 11.1–15.9)
IMM GRANULOCYTES # BLD AUTO: 0 X10E3/UL (ref 0–0.1)
IMM GRANULOCYTES NFR BLD AUTO: 0 %
LDLC SERPL CALC-MCNC: 144 MG/DL (ref 0–99)
LYMPHOCYTES # BLD AUTO: 3.1 X10E3/UL (ref 0.7–3.1)
LYMPHOCYTES NFR BLD AUTO: 37 %
MCH RBC QN AUTO: 31.8 PG (ref 26.6–33)
MCHC RBC AUTO-ENTMCNC: 33.8 G/DL (ref 31.5–35.7)
MCV RBC AUTO: 94 FL (ref 79–97)
MONOCYTES # BLD AUTO: 0.7 X10E3/UL (ref 0.1–0.9)
MONOCYTES NFR BLD AUTO: 9 %
NEUTROPHILS # BLD AUTO: 4.3 X10E3/UL (ref 1.4–7)
NEUTROPHILS NFR BLD AUTO: 51 %
PLATELET # BLD AUTO: 335 X10E3/UL (ref 150–450)
POTASSIUM SERPL-SCNC: 4.1 MMOL/L (ref 3.5–5.2)
PROT SERPL-MCNC: 6.8 G/DL (ref 6–8.5)
RBC # BLD AUTO: 5 X10E6/UL (ref 3.77–5.28)
SODIUM SERPL-SCNC: 136 MMOL/L (ref 134–144)
T4 FREE SERPL-MCNC: 1.03 NG/DL (ref 0.82–1.77)
TRIGL SERPL-MCNC: 93 MG/DL (ref 0–149)
TSH SERPL DL<=0.005 MIU/L-ACNC: 3.53 UIU/ML (ref 0.45–4.5)
VIT B12 SERPL-MCNC: 896 PG/ML (ref 232–1245)
VLDLC SERPL CALC-MCNC: 16 MG/DL (ref 5–40)
WBC # BLD AUTO: 8.3 X10E3/UL (ref 3.4–10.8)

## 2024-06-05 RX ORDER — DILTIAZEM HYDROCHLORIDE 120 MG/1
120 CAPSULE, EXTENDED RELEASE ORAL DAILY
Qty: 90 CAPSULE | Refills: 3 | Status: SHIPPED | OUTPATIENT
Start: 2024-06-05

## 2024-06-05 NOTE — TELEPHONE ENCOUNTER
Patient called for a refill on her Diltiazem 120 mg 1 time daily. I tried to fill but there is duplicates       Please Advise

## 2024-06-06 ENCOUNTER — PRIOR AUTHORIZATION (OUTPATIENT)
Dept: FAMILY MEDICINE CLINIC | Facility: CLINIC | Age: 69
End: 2024-06-06
Payer: COMMERCIAL

## 2024-06-06 NOTE — TELEPHONE ENCOUNTER
Pitavastatin Calcium 2MG tablets    PA Case ID #: [id number]  Rx #: [id number]    Sent to plan today

## 2024-06-07 NOTE — TELEPHONE ENCOUNTER
Pitavastatin Calcium 2MG tablets    PA Case ID #: [id number]  Rx #: [id number]    Status: Approved  Coverage Starts on: 6/6/2024 12:00:00 AM  Coverage Ends on: 6/6/2025 12:00:00 AM    Authorization Expiration Date: 6/5/2025

## 2024-06-13 RX ORDER — VALSARTAN AND HYDROCHLOROTHIAZIDE 80; 12.5 MG/1; MG/1
1 TABLET, FILM COATED ORAL DAILY
Qty: 30 TABLET | Refills: 2 | Status: SHIPPED | OUTPATIENT
Start: 2024-06-13

## 2024-07-01 RX ORDER — VALSARTAN 320 MG/1
TABLET ORAL
Qty: 90 TABLET | Refills: 0 | OUTPATIENT
Start: 2024-07-01

## 2024-07-08 DIAGNOSIS — S32.030G CLOSED WEDGE COMPRESSION FRACTURE OF L3 VERTEBRA WITH DELAYED HEALING, SUBSEQUENT ENCOUNTER: Primary | ICD-10-CM

## 2024-07-08 DIAGNOSIS — M85.89 OSTEOPENIA OF MULTIPLE SITES: ICD-10-CM

## 2024-07-12 ENCOUNTER — LAB (OUTPATIENT)
Dept: FAMILY MEDICINE CLINIC | Facility: CLINIC | Age: 69
End: 2024-07-12
Payer: COMMERCIAL

## 2024-07-12 DIAGNOSIS — M85.89 OSTEOPENIA OF MULTIPLE SITES: ICD-10-CM

## 2024-07-12 DIAGNOSIS — I10 HYPERTENSION, UNSPECIFIED TYPE: ICD-10-CM

## 2024-07-12 DIAGNOSIS — E53.8 LOW FOLIC ACID: Primary | ICD-10-CM

## 2024-07-13 LAB
25(OH)D3+25(OH)D2 SERPL-MCNC: 41.9 NG/ML (ref 30–100)
BUN SERPL-MCNC: 11 MG/DL (ref 8–27)
BUN/CREAT SERPL: 11 (ref 12–28)
CALCIUM SERPL-MCNC: 9.9 MG/DL (ref 8.7–10.3)
CHLORIDE SERPL-SCNC: 98 MMOL/L (ref 96–106)
CO2 SERPL-SCNC: 26 MMOL/L (ref 20–29)
CREAT SERPL-MCNC: 1.02 MG/DL (ref 0.57–1)
EGFRCR SERPLBLD CKD-EPI 2021: 60 ML/MIN/1.73
GLUCOSE SERPL-MCNC: 98 MG/DL (ref 70–99)
MAGNESIUM SERPL-MCNC: 1.8 MG/DL (ref 1.6–2.3)
PHOSPHATE SERPL-MCNC: 3.7 MG/DL (ref 3–4.3)
POTASSIUM SERPL-SCNC: 4.4 MMOL/L (ref 3.5–5.2)
SODIUM SERPL-SCNC: 138 MMOL/L (ref 134–144)

## 2024-07-15 ENCOUNTER — INFUSION (OUTPATIENT)
Dept: ONCOLOGY | Facility: HOSPITAL | Age: 69
End: 2024-07-15
Payer: COMMERCIAL

## 2024-07-15 ENCOUNTER — APPOINTMENT (OUTPATIENT)
Dept: OTHER | Facility: HOSPITAL | Age: 69
End: 2024-07-15
Payer: COMMERCIAL

## 2024-07-15 VITALS — RESPIRATION RATE: 16 BRPM | TEMPERATURE: 98.1 F

## 2024-07-15 DIAGNOSIS — M85.89 OSTEOPENIA OF MULTIPLE SITES: ICD-10-CM

## 2024-07-15 DIAGNOSIS — S32.020A COMPRESSION FRACTURE OF L2 VERTEBRA, INITIAL ENCOUNTER: Primary | ICD-10-CM

## 2024-07-15 PROCEDURE — 96372 THER/PROPH/DIAG INJ SC/IM: CPT

## 2024-07-15 PROCEDURE — 25010000002 DENOSUMAB 60 MG/ML SOLUTION PREFILLED SYRINGE: Performed by: PHYSICIAN ASSISTANT

## 2024-07-15 RX ADMIN — DENOSUMAB 60 MG: 60 INJECTION SUBCUTANEOUS at 13:59

## 2024-07-22 RX ORDER — VALSARTAN AND HYDROCHLOROTHIAZIDE 80; 12.5 MG/1; MG/1
1 TABLET, FILM COATED ORAL DAILY
Qty: 90 TABLET | Refills: 0 | Status: SHIPPED | OUTPATIENT
Start: 2024-07-22

## 2024-09-12 ENCOUNTER — HOSPITAL ENCOUNTER (OUTPATIENT)
Facility: HOSPITAL | Age: 69
Discharge: HOME | End: 2024-09-12
Payer: COMMERCIAL

## 2024-09-12 ENCOUNTER — OFFICE VISIT (OUTPATIENT)
Age: 69
End: 2024-09-12
Payer: COMMERCIAL

## 2024-09-12 VITALS
WEIGHT: 163 LBS | DIASTOLIC BLOOD PRESSURE: 80 MMHG | SYSTOLIC BLOOD PRESSURE: 132 MMHG | HEIGHT: 67 IN | BODY MASS INDEX: 25.58 KG/M2

## 2024-09-12 DIAGNOSIS — Z72.0 TOBACCO ABUSE: Chronic | ICD-10-CM

## 2024-09-12 DIAGNOSIS — I65.23 CAROTID STENOSIS, BILATERAL: ICD-10-CM

## 2024-09-12 DIAGNOSIS — I65.23 BILATERAL CAROTID ARTERY STENOSIS: ICD-10-CM

## 2024-09-12 DIAGNOSIS — I65.23 BILATERAL CAROTID ARTERY STENOSIS: Chronic | ICD-10-CM

## 2024-09-12 DIAGNOSIS — I72.4 ANEURYSM OF ARTERY OF LOWER EXTREMITY: ICD-10-CM

## 2024-09-12 DIAGNOSIS — I73.9 PAD (PERIPHERAL ARTERY DISEASE): Primary | Chronic | ICD-10-CM

## 2024-09-12 DIAGNOSIS — I73.9 PERIPHERAL VASCULAR DISEASE, UNSPECIFIED: ICD-10-CM

## 2024-09-12 DIAGNOSIS — Z98.62 H/O TRANSCAROTID ARTERY REVASCULARIZATION (TCAR): ICD-10-CM

## 2024-09-12 PROCEDURE — 93978 VASCULAR STUDY: CPT

## 2024-09-12 PROCEDURE — 93880 EXTRACRANIAL BILAT STUDY: CPT

## 2024-09-12 PROCEDURE — 93922 UPR/L XTREMITY ART 2 LEVELS: CPT

## 2024-09-12 PROCEDURE — 99214 OFFICE O/P EST MOD 30 MIN: CPT | Performed by: NURSE PRACTITIONER

## 2024-09-12 NOTE — PROGRESS NOTES
Chief Complaint  Carotid Artery Disease and Peripheral Vascular Disease    Subjective        Nelsy Stokes presents to [facility]  HPI   Nelsy Stokes is a 69 y.o. female that has been followed in our office for carotid artery stenosis and peripheral arterial disease.  In 2018, she had a left TCAR.  She returns today in follow up along with a carotid and aortic duplex as well as ABIs. She  reports she has been doing well without hospitalizations or surgeries. She denies any symptoms consistent with CVA, TIA, or amaurosis fugax. She  denies any worsening claudication symptoms, rest pain, or tissue loss. She reports she is having leg cramps and thinks it is related to her statin. She  denies any left arm claudication symptoms consistent with vertebrobasilar insufficiency.     Review of Systems   Constitutional:  Negative for fever.   Eyes:  Negative for visual disturbance.   Cardiovascular:  Negative for leg swelling.   Gastrointestinal:  Negative for abdominal pain.   Musculoskeletal:  Negative for back pain.   Skin:  Negative for color change, pallor and wound.   Neurological:  Negative for dizziness, facial asymmetry, speech difficulty and weakness.        Nelsy Stokes  reports that she has been smoking cigarettes. She has a 50 pack-year smoking history. She has been exposed to tobacco smoke. She has never used smokeless tobacco..   Tobacco Education/Cessation: Nelsy Stokes  reports that she has been smoking cigarettes. She has a 50 pack-year smoking history. She has been exposed to tobacco smoke. She has never used smokeless tobacco. I have educated her on the risk of diseases from using tobacco products such as cancer, COPD, heart disease, and arterial disease.     I advised her to quit and she is willing to quit. We have discussed the following method/s for tobacco cessation:  Counseling.      I spent 3  minutes counseling the patient.           Objective   Vital  Signs:  Vitals:    09/12/24 0926   BP: 132/80      Body mass index is 25.52 kg/m².           Physical Exam  Vitals reviewed.   Constitutional:       Appearance: Normal appearance.   HENT:      Head: Normocephalic.   Cardiovascular:      Rate and Rhythm: Normal rate and regular rhythm.      Pulses: Normal pulses.           Dorsalis pedis pulses are 3+ on the right side and 3+ on the left side.        Posterior tibial pulses are 3+ on the right side and 3+ on the left side.   Pulmonary:      Effort: Pulmonary effort is normal.   Skin:     General: Skin is warm.   Neurological:      General: No focal deficit present.      Mental Status: She is alert and oriented to person, place, and time.   Psychiatric:         Mood and Affect: Mood normal.          Result Review :      Previous carotid duplex: Less than 50% stenosis on the right and a patent stent on the left    Carotid duplex from today: Duplex Carotid Ultrasound CAR (09/12/2024 08:35)     Previous ABIs: 0.87 on the right and 0.83 on the left    ABIs today:Doppler Ankle Brachial Index Single Level CAR (09/12/2024 09:20)   Duplex Aorta IVC Iliac Graft Complete CAR (09/12/2024 09:05)                    Assessment and Plan     Diagnoses and all orders for this visit:    1. PAD (peripheral artery disease) (Primary)  -     Doppler Ankle Brachial Index Single Level CAR; Future    2. Bilateral carotid artery stenosis    3. H/O transcarotid artery revascularization (TCAR)    4. Tobacco abuse    5. Carotid stenosis, bilateral  -     Duplex Carotid Ultrasound CAR; Future             Patient presents today for ongoing management of her  carotid artery stenosis and peripheral arterial disease.  She has mild carotid stenosis on the right and a patent left carotid stent.  She has retrograde flow to her left vertebral artery.  This is unchanged.  Her ABIs are improved today 0.9 bilaterally.  She has moderate external iliac artery stenosis, I do not think we need to continue to  follow serial aortic duplexes.  She is to continue her antiplatelet agent which is aspirin. She is on a statin for cholesterol control.  I recommended that she trial off of the statin to see if this helps her leg pain.  If it does, she will talk to her primary care provider about other treatments for her cholesterol management.  We also discussed smoking cessation.  She reports she tried to take Wellbutrin, though she could not tolerate the side effects.  She is going to likely try nicotine patches.  We discussed adequate blood pressure control. She will return in 1 year along with a repeat carotid artery duplex and ABIs.    Follow Up     Return in about 1 year (around 9/12/2025) for estelita, carotid duplex.  Patient was given instructions and counseling regarding her condition or for health maintenance advice. Please see specific information pulled into the AVS if appropriate.     BENITO Ding

## 2024-09-16 LAB
ABDOMINAL DIST AORTA AP: 1.3 CM
ABDOMINAL DIST AORTA TRANS: 1.3 CM
ABDOMINAL DIST AORTA VEL: 61 CM/S
ABDOMINAL LT COM ILIAC AP: 0.8 CM
ABDOMINAL LT COM ILIAC TRANS: 0.9 CM
ABDOMINAL LT COM ILIAC VEL: 182 CM/S
ABDOMINAL LT EXT ILIAC VEL: 345 CM/S
ABDOMINAL MID AORTA AP: 1.7 CM
ABDOMINAL MID AORTA TRANS: 1.7 CM
ABDOMINAL MID AORTA VEL: 54 CM/S
ABDOMINAL PROX AORTA AP: 2.3 CM
ABDOMINAL PROX AORTA TRANS: 2.2 CM
ABDOMINAL PROX AORTA VEL: 63 CM/S
ABDOMINAL RT COM ILIAC AP: 0.8 CM
ABDOMINAL RT COM ILIAC TRANS: 0.8 CM
ABDOMINAL RT COM ILIAC VEL: 211 CM/S
ABDOMINAL RT EXT ILIAC VEL: 317 CM/S
BH CV LEFT CCA HIDDEN LRR: 1 CM/S
BH CV LOWER ARTERIAL LEFT ABI RATIO: 0.99
BH CV LOWER ARTERIAL LEFT DORSALIS PEDIS SYS MAX: 110
BH CV LOWER ARTERIAL LEFT POST TIBIAL SYS MAX: 130
BH CV LOWER ARTERIAL RIGHT ABI RATIO: 0.97
BH CV LOWER ARTERIAL RIGHT DORSALIS PEDIS SYS MAX: 122
BH CV LOWER ARTERIAL RIGHT POST TIBIAL SYS MAX: 128
BH CV VAS ABD AO LT EXTERNAL ILIAC TRANS: 0.8 CM
BH CV VAS ABD AO RT EXTERNAL ILIAC AP: 0.7 CM
BH CV XLRA MEAS LEFT CAROTID BULB EDV: -25.9 CM/SEC
BH CV XLRA MEAS LEFT CAROTID BULB PSV: -80.7 CM/SEC
BH CV XLRA MEAS LEFT DIST CCA EDV: 19.8 CM/SEC
BH CV XLRA MEAS LEFT DIST CCA PSV: 49.4 CM/SEC
BH CV XLRA MEAS LEFT DIST ICA EDV: -21.7 CM/SEC
BH CV XLRA MEAS LEFT DIST ICA PSV: -81.3 CM/SEC
BH CV XLRA MEAS LEFT ICA/CCA RATIO: 2.86
BH CV XLRA MEAS LEFT MID CCA EDV: -24.2 CM/SEC
BH CV XLRA MEAS LEFT MID CCA PSV: -67.1 CM/SEC
BH CV XLRA MEAS LEFT MID ICA EDV: -36.4 CM/SEC
BH CV XLRA MEAS LEFT MID ICA PSV: -124.8 CM/SEC
BH CV XLRA MEAS LEFT PROX CCA EDV: -22.4 CM/SEC
BH CV XLRA MEAS LEFT PROX CCA PSV: -66.5 CM/SEC
BH CV XLRA MEAS LEFT PROX ECA EDV: -34.9 CM/SEC
BH CV XLRA MEAS LEFT PROX ECA PSV: -259.5 CM/SEC
BH CV XLRA MEAS LEFT PROX ICA EDV: -39 CM/SEC
BH CV XLRA MEAS LEFT PROX ICA PSV: -140.4 CM/SEC
BH CV XLRA MEAS LEFT PROX SCLA PSV: 61.5 CM/SEC
BH CV XLRA MEAS RIGHT CAROTID BULB EDV: -12.6 CM/SEC
BH CV XLRA MEAS RIGHT CAROTID BULB PSV: -41.3 CM/SEC
BH CV XLRA MEAS RIGHT DIST CCA EDV: -15.5 CM/SEC
BH CV XLRA MEAS RIGHT DIST CCA PSV: -57.8 CM/SEC
BH CV XLRA MEAS RIGHT DIST ICA EDV: -25.1 CM/SEC
BH CV XLRA MEAS RIGHT DIST ICA PSV: -103.5 CM/SEC
BH CV XLRA MEAS RIGHT ICA/CCA RATIO: 1.79
BH CV XLRA MEAS RIGHT MID CCA EDV: 13.3 CM/SEC
BH CV XLRA MEAS RIGHT MID CCA PSV: 74.5 CM/SEC
BH CV XLRA MEAS RIGHT MID ICA EDV: 23.8 CM/SEC
BH CV XLRA MEAS RIGHT MID ICA PSV: 77.1 CM/SEC
BH CV XLRA MEAS RIGHT PROX CCA EDV: -14.1 CM/SEC
BH CV XLRA MEAS RIGHT PROX CCA PSV: -80.7 CM/SEC
BH CV XLRA MEAS RIGHT PROX ECA EDV: -14 CM/SEC
BH CV XLRA MEAS RIGHT PROX ECA PSV: -98.1 CM/SEC
BH CV XLRA MEAS RIGHT PROX ICA EDV: -26.6 CM/SEC
BH CV XLRA MEAS RIGHT PROX ICA PSV: -84.1 CM/SEC
BH CV XLRA MEAS RIGHT PROX SCLA PSV: 180.3 CM/SEC
BH CV XLRA MEAS RIGHT VERTEBRAL A EDV: -28 CM/SEC
BH CV XLRA MEAS RIGHT VERTEBRAL A PSV: -148.8 CM/SEC
BH CVPROX LEFT ICA HIDDEN LRR: 1 CM
LEFT ARM BP: 112 MMHG
RIGHT ARM BP: NORMAL MMHG
UPPER ARTERIAL LEFT ARM BRACHIAL SYS MAX: 112
UPPER ARTERIAL RIGHT ARM BRACHIAL SYS MAX: NORMAL

## 2024-09-23 ENCOUNTER — PATIENT MESSAGE (OUTPATIENT)
Dept: FAMILY MEDICINE CLINIC | Facility: CLINIC | Age: 69
End: 2024-09-23
Payer: COMMERCIAL

## 2024-09-23 RX ORDER — FLUCONAZOLE 100 MG/1
TABLET ORAL
Qty: 11 TABLET | Refills: 1 | Status: SHIPPED | OUTPATIENT
Start: 2024-09-23

## 2024-10-17 ENCOUNTER — OFFICE VISIT (OUTPATIENT)
Dept: FAMILY MEDICINE CLINIC | Facility: CLINIC | Age: 69
End: 2024-10-17
Payer: COMMERCIAL

## 2024-10-17 VITALS
HEIGHT: 67 IN | BODY MASS INDEX: 26.53 KG/M2 | HEART RATE: 68 BPM | TEMPERATURE: 97.7 F | OXYGEN SATURATION: 96 % | DIASTOLIC BLOOD PRESSURE: 80 MMHG | RESPIRATION RATE: 16 BRPM | SYSTOLIC BLOOD PRESSURE: 130 MMHG | WEIGHT: 169 LBS

## 2024-10-17 DIAGNOSIS — J44.9 COPD MIXED TYPE: Chronic | ICD-10-CM

## 2024-10-17 DIAGNOSIS — R19.5 CHANGE IN STOOL CALIBER: ICD-10-CM

## 2024-10-17 DIAGNOSIS — I10 ESSENTIAL HYPERTENSION: Primary | Chronic | ICD-10-CM

## 2024-10-17 DIAGNOSIS — E78.2 MIXED HYPERLIPIDEMIA: Chronic | ICD-10-CM

## 2024-10-17 DIAGNOSIS — I77.1 SUBCLAVIAN ARTERIAL STENOSIS: Chronic | ICD-10-CM

## 2024-10-17 DIAGNOSIS — I73.9 PAD (PERIPHERAL ARTERY DISEASE): Chronic | ICD-10-CM

## 2024-10-17 DIAGNOSIS — Z72.0 TOBACCO ABUSE: Chronic | ICD-10-CM

## 2024-10-17 DIAGNOSIS — M81.0 OSTEOPOROSIS OF LUMBAR SPINE: Chronic | ICD-10-CM

## 2024-10-17 DIAGNOSIS — R10.30 LOWER ABDOMINAL PAIN: ICD-10-CM

## 2024-10-17 DIAGNOSIS — I70.0 AORTIC ATHEROSCLEROSIS: Chronic | ICD-10-CM

## 2024-10-17 DIAGNOSIS — K21.9 GASTROESOPHAGEAL REFLUX DISEASE WITHOUT ESOPHAGITIS: Chronic | ICD-10-CM

## 2024-10-17 DIAGNOSIS — R73.01 IMPAIRED FASTING GLUCOSE: Chronic | ICD-10-CM

## 2024-10-17 DIAGNOSIS — Z78.9 STATIN INTOLERANCE: ICD-10-CM

## 2024-10-17 PROCEDURE — 99214 OFFICE O/P EST MOD 30 MIN: CPT | Performed by: PHYSICIAN ASSISTANT

## 2024-10-17 RX ORDER — FOLIC ACID 1 MG/1
1000 TABLET ORAL DAILY
Qty: 90 TABLET | Refills: 1 | Status: SHIPPED | OUTPATIENT
Start: 2024-10-17

## 2024-10-17 RX ORDER — CHOLECALCIFEROL (VITAMIN D3) 25 MCG
1 TABLET,CHEWABLE ORAL DAILY
Qty: 90 LOZENGE | Refills: 1 | Status: SHIPPED | OUTPATIENT
Start: 2024-10-17

## 2024-10-17 RX ORDER — VALSARTAN AND HYDROCHLOROTHIAZIDE 80; 12.5 MG/1; MG/1
1 TABLET, FILM COATED ORAL DAILY
Qty: 90 TABLET | Refills: 1 | Status: SHIPPED | OUTPATIENT
Start: 2024-10-17

## 2024-10-17 RX ORDER — PANTOPRAZOLE SODIUM 40 MG/1
TABLET, DELAYED RELEASE ORAL
Qty: 90 TABLET | Refills: 1 | Status: SHIPPED | OUTPATIENT
Start: 2024-10-17

## 2024-10-17 NOTE — PROGRESS NOTES
"Subjective   Nelsy Stokes is a 69 y.o. female.     Cough    Hyperlipidemia        Since the last visit, she has overall felt well.  She has Primary Hypertension and well controlled on current medication, Impaired fasting glucose and will monitor labs to watch for DMII, GERD controlled on PPI Rx, Hyperlipidemia and will start medication plan for treatment.  I will order follow up labs, and Vitamin D deficiency and will update labs for continued management.  she has been compliant with current medications have reviewed them.  The patient denies medication side effects.  Will refill medications. /70   Pulse 68   Temp 97.7 °F (36.5 °C) (Temporal)   Resp 16   Ht 170.2 cm (67.01\")   Wt 76.7 kg (169 lb)   LMP  (LMP Unknown)   SpO2 96%   BMI 26.46 kg/m² .      Recent cough----gone now    Hospital Outpatient Visit on 09/12/2024   Component Date Value Ref Range Status    Left CCA Stent hidden 09/12/2024 1.00  cm/s Final    BH CVPROX LEFT ICA HIDDEN LRR 09/12/2024 1.00  cm Final    Right arm BP 09/12/2024 132/80  mmHg Final    Left arm BP 09/12/2024 112  mmHg Final    Prox CCA PSV 09/12/2024 -80.7  cm/sec Final    Prox CCA EDV 09/12/2024 -14.1  cm/sec Final    Right Mid CCA PSV 09/12/2024 74.5  cm/sec Final    right Mid CCA EDV 09/12/2024 13.3  cm/sec Final    Dist CCA PSV 09/12/2024 -57.8  cm/sec Final    Dist CCA EDV 09/12/2024 -15.5  cm/sec Final    Prox ICA PSV 09/12/2024 -84.1  cm/sec Final    Prox ICA EDV 09/12/2024 -26.6  cm/sec Final    Mid ICA PSV 09/12/2024 77.1  cm/sec Final    Mid ICA EDV 09/12/2024 23.8  cm/sec Final    Dist ICA PSV 09/12/2024 -103.5  cm/sec Final    Dist ICA EDV 09/12/2024 -25.1  cm/sec Final    Prox ECA PSV 09/12/2024 -98.1  cm/sec Final    Prox ECA EDV 09/12/2024 -14.0  cm/sec Final    Vertebral A PSV 09/12/2024 -148.8  cm/sec Final    Vertebral A EDV 09/12/2024 -28.0  cm/sec Final    Prox SCLA PSV 09/12/2024 180.3  cm/sec Final    Prox CCA PSV 09/12/2024 -66.5  cm/sec Final "    Prox CCA EDV 09/12/2024 -22.4  cm/sec Final    left Mid CCA PSV 09/12/2024 -67.1  cm/sec Final    left Mid CCA EDV 09/12/2024 -24.2  cm/sec Final    Dist CCA PSV 09/12/2024 49.4  cm/sec Final    Dist CCA EDV 09/12/2024 19.8  cm/sec Final    Prox ICA PSV 09/12/2024 -140.4  cm/sec Final    Prox ICA EDV 09/12/2024 -39.0  cm/sec Final    Mid ICA PSV 09/12/2024 -124.8  cm/sec Final    Mid ICA EDV 09/12/2024 -36.4  cm/sec Final    Dist ICA PSV 09/12/2024 -81.3  cm/sec Final    Dist ICA EDV 09/12/2024 -21.7  cm/sec Final    Prox ECA PSV 09/12/2024 -259.5  cm/sec Final    Prox ECA EDV 09/12/2024 -34.9  cm/sec Final    Prox SCLA PSV 09/12/2024 61.5  cm/sec Final    XLRA WALTER RIGHT CAROTID BULB PSV 09/12/2024 -41.3  cm/sec Final    XLRA WALTER RIGHT CAROTID BULB EDV 09/12/2024 -12.6  cm/sec Final    XLRA WALTER LEFT CAROTID BULB PSV 09/12/2024 -80.7  cm/sec Final    XLRA WALTER LEFT CAROTID BULB EDV 09/12/2024 -25.9  cm/sec Final    ICA/CCA ratio 09/12/2024 1.79   Final    ICA/CCA ratio 09/12/2024 2.86   Final   Hospital Outpatient Visit on 09/12/2024   Component Date Value Ref Range Status    Upper arterial right arm brachial * 09/12/2024 132/80   Final    Upper arterial left arm brachial s* 09/12/2024 112   Final    RIGHT POST TIBIAL SYS MAX 09/12/2024 128   Final    LEFT POST TIBIAL SYS MAX 09/12/2024 130   Final    RIGHT DORSALIS PEDIS SYS MAX 09/12/2024 122   Final    LEFT DORSALIS PEDIS SYS MAX 09/12/2024 110   Final    RIGHT IRENE RATIO 09/12/2024 0.97   Final    LEFT IRENE RATIO 09/12/2024 0.99   Final   Hospital Outpatient Visit on 09/12/2024   Component Date Value Ref Range Status    Abdominal prox aorta amy 09/12/2024 63.0  cm/s Final    Abdominal prox aorta AP 09/12/2024 2.3  cm Final    Abdominal prox aorta trans 09/12/2024 2.2  cm Final    Abdominal mid aorta amy 09/12/2024 54.0  cm/s Final    Abdominal mid aorta AP 09/12/2024 1.7  cm Final    Abdominal mid aorta trans 09/12/2024 1.7  cm Final    Abdominal dist aorta  amy 09/12/2024 61.0  cm/s Final    Abdominal dist aorta AP 09/12/2024 1.3  cm Final    Abdominal dist aorta trans 09/12/2024 1.3  cm Final    Abdominal rt com iliac amy 09/12/2024 211.0  cm/s Final    Abdominal rt com iliac AP 09/12/2024 0.8  cm Final    Abdominal rt com iliac trans 09/12/2024 0.8  cm Final    Abdominal rt ext iliac amy 09/12/2024 317.0  cm/s Final    BH CV VAS ABD AO RT EXTERNAL ILIAC* 09/12/2024 0.7  cm Final    Abdominal lt com iliac amy 09/12/2024 182.0  cm/s Final    Abdominal lt com iliac AP 09/12/2024 0.8  cm Final    Abdominal lt com iliac trans 09/12/2024 0.9  cm Final    Abdominal lt ext iliac amy 09/12/2024 345.0  cm/s Final    BH CV VAS ABD AO LT EXTERNAL ILIAC* 09/12/2024 0.8  cm Final   Lab on 07/12/2024   Component Date Value Ref Range Status    Glucose 07/12/2024 98  70 - 99 mg/dL Final    BUN 07/12/2024 11  8 - 27 mg/dL Final    Creatinine 07/12/2024 1.02 (H)  0.57 - 1.00 mg/dL Final    EGFR Result 07/12/2024 60  >59 mL/min/1.73 Final    BUN/Creatinine Ratio 07/12/2024 11 (L)  12 - 28 Final    Sodium 07/12/2024 138  134 - 144 mmol/L Final    Potassium 07/12/2024 4.4  3.5 - 5.2 mmol/L Final    Chloride 07/12/2024 98  96 - 106 mmol/L Final    Total CO2 07/12/2024 26  20 - 29 mmol/L Final    Calcium 07/12/2024 9.9  8.7 - 10.3 mg/dL Final    Phosphorus 07/12/2024 3.7  3.0 - 4.3 mg/dL Final    Magnesium 07/12/2024 1.8  1.6 - 2.3 mg/dL Final    25 Hydroxy, Vitamin D 07/12/2024 41.9  30.0 - 100.0 ng/mL Final    Comment: Vitamin D deficiency has been defined by the [place] of  [employer] and an Endocrine Society practice guideline as a  level of serum 25-OH vitamin D less than 20 ng/mL (1,2).  The Endocrine Society went on to further define vitamin D  insufficiency as a level between 21 and 29 ng/mL (2).  1. IOM ([place] [employer]). 2010. Dietary reference     intakes for calcium and D. [place]: The     National Academies Press.  2. Marisol KEMP, Priscilla RAJAN, Makenna TUCKER, et  al.     Evaluation, treatment, and prevention of vitamin D     deficiency: an Endocrine Society clinical practice     guideline. JCEM. 2011 Jul; 96(7):8971-30.       6/4/2024  6:04 PM EDT       Your hemoglobin A1c average glucose for 2 months increased from last labs and is closer to type 2 diabetes..... Recommend low glycemic index diet exercise and keeping weight down.  Will need labs again in 6 months.  Vitamin D is a bit low add vitamin D3 2000 IU daily.  Kidney and liver functions in normal range.  Thyroid labs in range.  Other vitamin levels in range.  Blood counts in acceptable range.  Cholesterol remains elevated and I know you are taking the generic Zetia and the Livalo.  I would like for you to consider letting me order Leqvio --it is an injectable cholesterol medication that works differently than statins and it is given at [facility] by injection every 6 months       9-12-24 had follow-up with vascular surgery  Patient presents today for ongoing management of her  carotid artery stenosis and peripheral arterial disease.  She has mild carotid stenosis on the right and a patent left carotid stent.  She has retrograde flow to her left vertebral artery.  This is unchanged.  Her ABIs are improved today 0.9 bilaterally.  She has moderate external iliac artery stenosis, I do not think we need to continue to follow serial aortic duplexes.  She is to continue her antiplatelet agent which is aspirin. She is on a statin for cholesterol control.  I recommended that she trial off of the statin to see if this helps her leg pain.  If it does, she will talk to her primary care provider about other treatments for her cholesterol management.  We also discussed smoking cessation.  She reports she tried to take Wellbutrin, though she could not tolerate the side effects.  She is going to likely try nicotine patches.  We discussed adequate blood pressure control. She will return in 1 year along with a repeat carotid  artery duplex and ABIs.  She failed her seventh statin and needs to go on Repatha per vascular surgeon as well as my recommendation  Follow Up   Return in about 1 year (around 9/12/2025) for estelita, carotid duplex  Still smokes    Dr Norton ----[facility]ed [facility] 10[facility]4/2024 noting she did have CT renal---no definite stone    She has also had issues with her stools there sometimes have lower abd pain,   some nausea.   last colonoscopy was Dr. Keating 2018 had 1 polyp  Last 5-3-24  Sees derm ---Darshana OLIVER   I do have her on metformin for treatment of impaired fasting glucose not type 2 diabetes she does not tolerate 2 tabs but she is doing well with 1 tab daily and I plan to update her hemoglobin A1c.   Reviewed notes from cardiology Dr. Bueno and saw her on 5/3/2024 for evaluation per recommendation of her vascular surgeon Dr. Leong.  He noted her history of fairly diffuse peripheral artery disease and bilateral cardiovascular disease, subclavian artery disease with subclavian steal syndrome, abdominal aorta moderate to severe arteriosclerotic disease, and coronary artery calcification.  Noted he had a stress test that was borderline abnormal.  Also noted some mid precordial chest pressure that is new.  She continues to smoke.  Exam findings negative cardiovascular.  Also noted her history of SVT and to continue medical therapy.  Ordered a Cardiolite stress test chemical study.---Regadenoson study was performed on 5/13/2024..... Dr. Valera read the study noting myocardial perfusion imaging indicates a normal myocardial perfusion study with no evidence of ischemia.  Impressions are consistent with low risk study.  She also takes diltiazem to control her APCs along noting her history of PSVT  She is released from orthopedic surgeon for the left humerus fracture  LAST visit neurosurgeon Dr Kay---6-21-23  History of Present Illness: Nelsy Stokes is a 68 y.o. female is here today for  follow-up on back and right leg pain. She is s/p L2-L3 Kyphoplasty 2/14//23. MRI Lumbar done 6/19/23. Today, she states she is doing well.  She completed to MDP and takes Gabapentin at night which has helped with the pain.  She reports she has had a significant improvement in her lower back pain and lower extremity pain.  Denies any recent changes in strength or sensation  Also note patient has osteoporosis and remains on Prolia check bone density every 2 years    Continues to see Dr. Leong for PAD  As noted on prior visit was on Plavix and due to extensive bruising had to be discontinued.  4-11-24  Had low dose CT chest----Conclusion: Stable sub-6 mm pulmonary nodules. Heavy coronary artery calcification.--have order for yearly      Last bone density was 8/29/2023 noting osteopenia in the hips and forearm  Sees DR Broderick Bosch pulmonology for this COPD and has appointment---last appt 5-6-24 and has her on inhalers  The following portions of the patient's history were reviewed and updated as appropriate: allergies, current medications, past family history, past medical history, past social history, past surgical history, and problem list.    Review of Systems   Respiratory:  Positive for cough.        Objective   Physical Exam  Vitals and nursing note reviewed.   Constitutional:       General: She is not in acute distress.     Appearance: Normal appearance. She is well-developed. She is not ill-appearing or toxic-appearing.   HENT:      Head: Normocephalic.      Right Ear: External ear normal.      Left Ear: External ear normal.      Nose: Nose normal.      Mouth/Throat:      Pharynx: Oropharynx is clear.   Eyes:      General: No scleral icterus.     Conjunctiva/sclera: Conjunctivae normal.      Pupils: Pupils are equal, round, and reactive to light.   Neck:      Thyroid: No thyromegaly.      Vascular: Carotid bruit present.   Cardiovascular:      Rate and Rhythm: Normal rate and regular rhythm.      Pulses: Normal  pulses.      Heart sounds: No murmur heard.     Comments: Did have occasional APC  Pulmonary:      Effort: Pulmonary effort is normal. No respiratory distress.      Breath sounds: Normal breath sounds.   Abdominal:      Tenderness: There is no abdominal tenderness.   Musculoskeletal:         General: No deformity. Normal range of motion.      Cervical back: Normal range of motion and neck supple.   Skin:     General: Skin is warm and dry.      Findings: No rash.   Neurological:      General: No focal deficit present.      Mental Status: She is alert and oriented to person, place, and time. Mental status is at baseline.   Psychiatric:         Mood and Affect: Mood normal.         Behavior: Behavior normal.         Thought Content: Thought content normal.         Judgment: Judgment normal.           Assessment & Plan   Diagnoses and all orders for this visit:    1. Essential hypertension (Primary)  -     Comprehensive metabolic panel; Future  -     Lipid panel; Future  -     CBC and Differential; Future  -     TSH; Future  -     Hemoglobin A1c; Future  -     Vitamin D,25-Hydroxy; Future  -     Vitamin B12; Future  -     Folate; Future  -     T4, Free; Future  -     Magnesium; Future  -     Phosphorus; Future  -     Evolocumab (REPATHA) solution prefilled syringe injection; Inject 1 mL under the skin into the appropriate area as directed Every 14 (Fourteen) Days. For cholesterol and peripheral artery disease  Dispense: 2 mL; Refill: 12    2. Mixed hyperlipidemia  -     Comprehensive metabolic panel; Future  -     Lipid panel; Future  -     CBC and Differential; Future  -     TSH; Future  -     Hemoglobin A1c; Future  -     Vitamin D,25-Hydroxy; Future  -     Vitamin B12; Future  -     Folate; Future  -     T4, Free; Future  -     Magnesium; Future  -     Phosphorus; Future  -     Evolocumab (REPATHA) solution prefilled syringe injection; Inject 1 mL under the skin into the appropriate area as directed Every 14  (Fourteen) Days. For cholesterol and peripheral artery disease  Dispense: 2 mL; Refill: 12    3. Gastroesophageal reflux disease without esophagitis  -     Comprehensive metabolic panel; Future  -     Lipid panel; Future  -     CBC and Differential; Future  -     TSH; Future  -     Hemoglobin A1c; Future  -     Vitamin D,25-Hydroxy; Future  -     Vitamin B12; Future  -     Folate; Future  -     T4, Free; Future  -     Magnesium; Future  -     Phosphorus; Future    4. PAD (peripheral artery disease)  -     Comprehensive metabolic panel; Future  -     Lipid panel; Future  -     CBC and Differential; Future  -     TSH; Future  -     Hemoglobin A1c; Future  -     Vitamin D,25-Hydroxy; Future  -     Vitamin B12; Future  -     Folate; Future  -     T4, Free; Future  -     Magnesium; Future  -     Phosphorus; Future  -     Evolocumab (REPATHA) solution prefilled syringe injection; Inject 1 mL under the skin into the appropriate area as directed Every 14 (Fourteen) Days. For cholesterol and peripheral artery disease  Dispense: 2 mL; Refill: 12    5. Subclavian arterial stenosis  -     Comprehensive metabolic panel; Future  -     Lipid panel; Future  -     CBC and Differential; Future  -     TSH; Future  -     Hemoglobin A1c; Future  -     Vitamin D,25-Hydroxy; Future  -     Vitamin B12; Future  -     Folate; Future  -     T4, Free; Future  -     Magnesium; Future  -     Phosphorus; Future  -     Evolocumab (REPATHA) solution prefilled syringe injection; Inject 1 mL under the skin into the appropriate area as directed Every 14 (Fourteen) Days. For cholesterol and peripheral artery disease  Dispense: 2 mL; Refill: 12    6. Impaired fasting glucose  -     Comprehensive metabolic panel; Future  -     Lipid panel; Future  -     CBC and Differential; Future  -     TSH; Future  -     Hemoglobin A1c; Future  -     Vitamin D,25-Hydroxy; Future  -     Vitamin B12; Future  -     Folate; Future  -     T4, Free; Future  -     Magnesium;  Future  -     Phosphorus; Future  -     Evolocumab (REPATHA) solution prefilled syringe injection; Inject 1 mL under the skin into the appropriate area as directed Every 14 (Fourteen) Days. For cholesterol and peripheral artery disease  Dispense: 2 mL; Refill: 12    7. Tobacco abuse  -     Comprehensive metabolic panel; Future  -     Lipid panel; Future  -     CBC and Differential; Future  -     TSH; Future  -     Hemoglobin A1c; Future  -     Vitamin D,25-Hydroxy; Future  -     Vitamin B12; Future  -     Folate; Future  -     T4, Free; Future  -     Magnesium; Future  -     Phosphorus; Future  -     Evolocumab (REPATHA) solution prefilled syringe injection; Inject 1 mL under the skin into the appropriate area as directed Every 14 (Fourteen) Days. For cholesterol and peripheral artery disease  Dispense: 2 mL; Refill: 12    8. Aortic atherosclerosis  -     Comprehensive metabolic panel; Future  -     Lipid panel; Future  -     CBC and Differential; Future  -     TSH; Future  -     Hemoglobin A1c; Future  -     Vitamin D,25-Hydroxy; Future  -     Vitamin B12; Future  -     Folate; Future  -     T4, Free; Future  -     Magnesium; Future  -     Phosphorus; Future  -     Evolocumab (REPATHA) solution prefilled syringe injection; Inject 1 mL under the skin into the appropriate area as directed Every 14 (Fourteen) Days. For cholesterol and peripheral artery disease  Dispense: 2 mL; Refill: 12    9. Lower abdominal pain  -     Ambulatory Referral to Gastroenterology  -     Comprehensive metabolic panel; Future  -     Lipid panel; Future  -     CBC and Differential; Future  -     TSH; Future  -     Hemoglobin A1c; Future  -     Vitamin D,25-Hydroxy; Future  -     Vitamin B12; Future  -     Folate; Future  -     T4, Free; Future  -     Magnesium; Future  -     Phosphorus; Future    10. Change in stool caliber  -     Ambulatory Referral to Gastroenterology  -     Comprehensive metabolic panel; Future  -     Lipid panel;  Future  -     CBC and Differential; Future  -     TSH; Future  -     Hemoglobin A1c; Future  -     Vitamin D,25-Hydroxy; Future  -     Vitamin B12; Future  -     Folate; Future  -     T4, Free; Future  -     Magnesium; Future  -     Phosphorus; Future    11. Osteoporosis of lumbar spine  -     Comprehensive metabolic panel; Future  -     Lipid panel; Future  -     CBC and Differential; Future  -     TSH; Future  -     Hemoglobin A1c; Future  -     Vitamin D,25-Hydroxy; Future  -     Vitamin B12; Future  -     Folate; Future  -     T4, Free; Future  -     Magnesium; Future  -     Phosphorus; Future    12. COPD mixed type  -     Comprehensive metabolic panel; Future  -     Lipid panel; Future  -     CBC and Differential; Future  -     TSH; Future  -     Hemoglobin A1c; Future  -     Vitamin D,25-Hydroxy; Future  -     Vitamin B12; Future  -     Folate; Future  -     T4, Free; Future  -     Magnesium; Future  -     Phosphorus; Future    13. Statin intolerance  -     Evolocumab (REPATHA) solution prefilled syringe injection; Inject 1 mL under the skin into the appropriate area as directed Every 14 (Fourteen) Days. For cholesterol and peripheral artery disease  Dispense: 2 mL; Refill: 12    Other orders  -     Cyanocobalamin (B-12) 1000 MCG lozenge; Place 1 tablet under the tongue Daily.  Dispense: 90 lozenge; Refill: 1  -     folic acid (FOLVITE) 1 MG tablet; Take 1 tablet by mouth Daily.  Dispense: 90 tablet; Refill: 1  -     valsartan-hydrochlorothiazide (Diovan HCT) 80-12.5 MG per tablet; Take 1 tablet by mouth Daily. For blood pressure  Dispense: 90 tablet; Refill: 1  -     pantoprazole (Protonix) 40 MG EC tablet; One PO daily for GERD  Dispense: 90 tablet; Refill: 1          I am concerned with her episodes of lower abdominal pain with bowel movements also has nausea and the caliber of the stool varies from each bowel movement it can be pencil thin or more snake size and also has history of GERD is controlled on  the PPI advised that she see GI  Patient has failed multiple statins including Crestor, Livalo, atorvastatin, pravastatin, simvastatin--- will start Repatha due to her known carotid artery disease   Sees vascular yearly for the severe PAD they also monitor her subclavian artery stenosis and aortic atherosclerosis  Labs Dec  Has severe osteoporosis and will continue the Prolia injections noting prior vertebral fractures requiring intervention with kyphoplasty  Watching glucose  She did add the vitamin D at 2000 units more after last labs in July continues to take B12 and folic acid and I will measure labs again in December  Sees DR Cueva---derm  Jaspal, Nelsy Stokes, was seen today.  she was seen for HTN and continue medication, Imparied fasting glucose and plan follow up labs, diet, and exercise, GERD and will continue on PPI medication, Hyperlipidemia with new medication plan, and Vitamin D deficiency and will update labs .  Continues on B12, folic acid, vitamin D update labs in December  Cough she was having---stopped  Answers submitted by the patient for this visit:  Primary Reason for Visit (Submitted on 10/10/2024)  What is the primary reason for your visit?: Cough

## 2024-11-18 ENCOUNTER — TELEPHONE (OUTPATIENT)
Age: 69
End: 2024-11-18
Payer: COMMERCIAL

## 2024-11-18 DIAGNOSIS — I65.23 BILATERAL CAROTID ARTERY STENOSIS: Primary | ICD-10-CM

## 2024-11-18 DIAGNOSIS — Z98.62 H/O TRANSCAROTID ARTERY REVASCULARIZATION (TCAR): ICD-10-CM

## 2024-11-18 NOTE — TELEPHONE ENCOUNTER
Patient called stating that she went to see her eye doctor last week, and they informed her that she had plaque break off from her carotid that is now behind her eye. They recommended that she inform our office of this incase she needs to be seen. Requested a call back if she needs to come in.     Call back would be [phone]

## 2024-11-18 NOTE — TELEPHONE ENCOUNTER
Spoke with Dr. Leong, he would like patient to have CTA of head and neck with 3D-R. Attempted reach patient to discuss symptoms and specifics, LVM to return call.

## 2024-11-21 ENCOUNTER — HOSPITAL ENCOUNTER (OUTPATIENT)
Dept: CT IMAGING | Facility: HOSPITAL | Age: 69
Discharge: HOME | End: 2024-11-21
Admitting: SURGERY
Payer: COMMERCIAL

## 2024-11-21 DIAGNOSIS — Z98.62 H/O TRANSCAROTID ARTERY REVASCULARIZATION (TCAR): ICD-10-CM

## 2024-11-21 DIAGNOSIS — I65.23 BILATERAL CAROTID ARTERY STENOSIS: ICD-10-CM

## 2024-11-21 PROCEDURE — 25510000001 IOPAMIDOL PER 1 ML: Performed by: SURGERY

## 2024-11-21 PROCEDURE — 70498 CT ANGIOGRAPHY NECK: CPT

## 2024-11-21 PROCEDURE — 70496 CT ANGIOGRAPHY HEAD: CPT

## 2024-11-21 RX ORDER — IOPAMIDOL 755 MG/ML
100 INJECTION, SOLUTION INTRAVASCULAR
Status: COMPLETED | OUTPATIENT
Start: 2024-11-21 | End: 2024-11-21

## 2024-11-21 RX ADMIN — IOPAMIDOL 95 ML: 755 INJECTION, SOLUTION INTRAVENOUS at 11:13

## 2024-12-02 ENCOUNTER — TELEPHONE (OUTPATIENT)
Age: 69
End: 2024-12-02

## 2024-12-02 NOTE — TELEPHONE ENCOUNTER
Patient called regarding latest CT scan. Stated she has not heard anything on the results. Unsure if she needs to come for a follow up, or if Dr. Leong will call her with the results.     Call back is [phone]

## 2024-12-19 ENCOUNTER — TELEPHONE (OUTPATIENT)
Dept: FAMILY MEDICINE CLINIC | Facility: CLINIC | Age: 69
End: 2024-12-19
Payer: COMMERCIAL

## 2024-12-19 NOTE — TELEPHONE ENCOUNTER
"  Caller: Nelsy Stokes \"BREANNA\"    Relationship: Self    Best call back number: [phone]        What was the call regarding: PATIENT HAS A LAB QUESTIONS SHE WOULD LIKE TO ASK KANU DUGGAN       PLEASE GIVE CALLBACK     "

## 2025-01-08 DIAGNOSIS — N89.8 VAGINAL DISCHARGE: Primary | ICD-10-CM

## 2025-01-13 DIAGNOSIS — S32.020A COMPRESSION FRACTURE OF L2 VERTEBRA, INITIAL ENCOUNTER: ICD-10-CM

## 2025-01-13 DIAGNOSIS — M85.89 OSTEOPENIA OF MULTIPLE SITES: Primary | ICD-10-CM

## 2025-01-14 LAB
APPEARANCE UR: ABNORMAL
BACTERIA #/AREA URNS HPF: ABNORMAL /HPF
BACTERIA UR CULT: NORMAL
BACTERIA UR CULT: NORMAL
BILIRUB UR QL STRIP: NEGATIVE
CASTS URNS MICRO: ABNORMAL
COLOR UR: YELLOW
EPI CELLS #/AREA URNS HPF: ABNORMAL /HPF
GLUCOSE UR QL STRIP: NEGATIVE
HGB UR QL STRIP: ABNORMAL
KETONES UR QL STRIP: NEGATIVE
LEUKOCYTE ESTERASE UR QL STRIP: ABNORMAL
NITRITE UR QL STRIP: NEGATIVE
PH UR STRIP: 7.5 [PH] (ref 5–8)
PROT UR QL STRIP: ABNORMAL
RBC #/AREA URNS HPF: ABNORMAL /HPF
SP GR UR STRIP: 1.02 (ref 1–1.03)
UROBILINOGEN UR STRIP-MCNC: ABNORMAL MG/DL
WBC #/AREA URNS HPF: ABNORMAL /HPF

## 2025-01-16 ENCOUNTER — INFUSION (OUTPATIENT)
Dept: ONCOLOGY | Facility: HOSPITAL | Age: 70
End: 2025-01-16
Payer: COMMERCIAL

## 2025-01-16 ENCOUNTER — APPOINTMENT (OUTPATIENT)
Dept: OTHER | Facility: HOSPITAL | Age: 70
End: 2025-01-16
Payer: COMMERCIAL

## 2025-01-16 DIAGNOSIS — M85.89 OSTEOPENIA OF MULTIPLE SITES: ICD-10-CM

## 2025-01-16 DIAGNOSIS — S32.020A COMPRESSION FRACTURE OF L2 VERTEBRA, INITIAL ENCOUNTER: Primary | ICD-10-CM

## 2025-01-16 DIAGNOSIS — S32.020A COMPRESSION FRACTURE OF L2 VERTEBRA, INITIAL ENCOUNTER: ICD-10-CM

## 2025-01-16 PROCEDURE — 96372 THER/PROPH/DIAG INJ SC/IM: CPT

## 2025-01-16 PROCEDURE — 25010000002 DENOSUMAB 60 MG/ML SOLUTION PREFILLED SYRINGE: Performed by: PHYSICIAN ASSISTANT

## 2025-01-16 RX ORDER — CHOLECALCIFEROL (VITAMIN D3) 25 MCG
1000 TABLET ORAL DAILY
COMMUNITY

## 2025-01-16 RX ADMIN — DENOSUMAB 60 MG: 60 INJECTION SUBCUTANEOUS at 13:58

## 2025-01-16 NOTE — NURSING NOTE
Arrived  for prolia injection. Indication and side effects reviewed. Denies recent dental work. Labs and medications verified. Prolia administered in left arm without incidence. Instructed to call prescribing MD for any concerns or questions and instructed on how to schedule future appts.  Pt vu and discharged in stable condition.

## 2025-02-04 ENCOUNTER — HOSPITAL ENCOUNTER (OUTPATIENT)
Dept: GENERAL RADIOLOGY | Facility: HOSPITAL | Age: 70
Discharge: HOME | End: 2025-02-04
Admitting: PHYSICIAN ASSISTANT
Payer: COMMERCIAL

## 2025-02-04 ENCOUNTER — TELEPHONE (OUTPATIENT)
Dept: FAMILY MEDICINE CLINIC | Facility: CLINIC | Age: 70
End: 2025-02-04
Payer: COMMERCIAL

## 2025-02-04 DIAGNOSIS — R05.1 ACUTE COUGH: ICD-10-CM

## 2025-02-04 DIAGNOSIS — R05.1 ACUTE COUGH: Primary | ICD-10-CM

## 2025-02-04 PROCEDURE — 71046 X-RAY EXAM CHEST 2 VIEWS: CPT

## 2025-02-04 NOTE — TELEPHONE ENCOUNTER
Received call from patient.  She was told that she needed a chest xray and went to [facility] to see about getting one.  She was told that she would need an order before she could get a chest xray.  I told her that I would send a message to Gretchen King and her MA regarding an order.

## 2025-02-04 NOTE — TELEPHONE ENCOUNTER
Called patient and she said that she received a message from Gretchen King that she was putting in the Xray order and everything was taken care of.

## 2025-02-28 DIAGNOSIS — I65.23 BILATERAL CAROTID ARTERY STENOSIS: Primary | ICD-10-CM

## 2025-02-28 DIAGNOSIS — I73.9 PERIPHERAL VASCULAR DISEASE, UNSPECIFIED: ICD-10-CM

## 2025-04-16 ENCOUNTER — HOSPITAL ENCOUNTER (OUTPATIENT)
Dept: CT IMAGING | Facility: HOSPITAL | Age: 70
Discharge: HOME | End: 2025-04-16
Admitting: PHYSICIAN ASSISTANT
Payer: COMMERCIAL

## 2025-04-16 DIAGNOSIS — Z72.0 TOBACCO ABUSE: ICD-10-CM

## 2025-04-16 PROCEDURE — 71271 CT THORAX LUNG CANCER SCR C-: CPT

## 2025-04-17 ENCOUNTER — TELEPHONE (OUTPATIENT)
Dept: FAMILY MEDICINE CLINIC | Facility: CLINIC | Age: 70
End: 2025-04-17
Payer: COMMERCIAL

## 2025-04-17 ENCOUNTER — TRANSCRIBE ORDERS (OUTPATIENT)
Dept: ADMINISTRATIVE | Facility: HOSPITAL | Age: 70
End: 2025-04-17
Payer: COMMERCIAL

## 2025-04-17 DIAGNOSIS — H35.62 RETINAL HEMORRHAGE OF LEFT EYE: Primary | ICD-10-CM

## 2025-04-17 NOTE — TELEPHONE ENCOUNTER
"  Caller: Nelsy Stokes \"BREANNA\"    Relationship to patient: Self    Best call back number:[phone]      Patient is needing: SHE JUST LEFT EYE DRChristian AND HAS SOME BLEEDING BEHIND HER RIGHT EYE.  HER H.R. IS RUNNING IN THE 80'S AND 90'S TOO.  DO YOU WANT HER TO COME IN AND TEST HER BLOOD SUGAR OR WHAT SHOULD SHE DO?          "

## 2025-04-17 NOTE — TELEPHONE ENCOUNTER
Spoke with patient to inform her per her provider Go ahead and start monitoring it now and she can give me an average every week on MyChart. Patient voiced understanding no further questions

## 2025-04-17 NOTE — TELEPHONE ENCOUNTER
Yes she needs a follow-up appointment and to check her blood pressure and log it morning and evening heart rate

## 2025-05-01 ENCOUNTER — HOSPITAL ENCOUNTER (OUTPATIENT)
Dept: CARDIOLOGY | Facility: HOSPITAL | Age: 70
Discharge: HOME | End: 2025-05-01
Admitting: OPTOMETRIST
Payer: COMMERCIAL

## 2025-05-01 ENCOUNTER — TRANSCRIBE ORDERS (OUTPATIENT)
Dept: ADMINISTRATIVE | Facility: HOSPITAL | Age: 70
End: 2025-05-01
Payer: COMMERCIAL

## 2025-05-01 ENCOUNTER — TELEPHONE (OUTPATIENT)
Age: 70
End: 2025-05-01
Payer: COMMERCIAL

## 2025-05-01 DIAGNOSIS — R93.89 ABNORMAL CT OF THE CHEST: Primary | ICD-10-CM

## 2025-05-01 DIAGNOSIS — H35.62 RETINAL HEMORRHAGE OF LEFT EYE: ICD-10-CM

## 2025-05-01 LAB
BH CV LEFT CCA HIDDEN LRR: 1 CM/S
BH CV MID LEFT ICA HIDDEN LRR: 1 CM
BH CV VAS CAROTID LEFT DISTAL STENT EDV: 35.5 CM/S
BH CV VAS CAROTID LEFT DISTAL STENT: 121 CM/S
BH CV VAS CAROTID LEFT DISTAL TO STENT EDV: 31.4 CM/S
BH CV VAS CAROTID LEFT DISTAL TO STENT: 114 CM/S
BH CV VAS CAROTID LEFT MID STENT EDV: 20.9 CM/S
BH CV VAS CAROTID LEFT MID STENT: 79 CM/S
BH CV VAS CAROTID LEFT PROXIMAL STENT EDV: 15.4 CM/S
BH CV VAS CAROTID LEFT PROXIMAL STENT: 55.4 CM/S
BH CV VAS CAROTID LEFT PROXIMAL TO STENT: 59.2 CM/S
BH CV VAS CAROTID LEFT STENT NATIVE VESSEL PROXIMAL ED: 16.5 CM/S
BH CV XLRA MEAS LEFT DIST CCA EDV: 16.5 CM/SEC
BH CV XLRA MEAS LEFT DIST CCA PSV: 59.2 CM/SEC
BH CV XLRA MEAS LEFT DIST ICA EDV: -28.6 CM/SEC
BH CV XLRA MEAS LEFT DIST ICA PSV: -139.5 CM/SEC
BH CV XLRA MEAS LEFT ICA/CCA RATIO: 2.36
BH CV XLRA MEAS LEFT MID ICA EDV: -35.5 CM/SEC
BH CV XLRA MEAS LEFT MID ICA PSV: -121.3 CM/SEC
BH CV XLRA MEAS LEFT PROX CCA EDV: -15.2 CM/SEC
BH CV XLRA MEAS LEFT PROX CCA PSV: -56 CM/SEC
BH CV XLRA MEAS LEFT PROX ECA PSV: 474 CM/SEC
BH CV XLRA MEAS LEFT PROX ICA EDV: -20.9 CM/SEC
BH CV XLRA MEAS LEFT PROX ICA PSV: -79 CM/SEC
BH CV XLRA MEAS LEFT PROX SCLA PSV: 121.3 CM/SEC
BH CV XLRA MEAS RIGHT DIST CCA EDV: 13 CM/SEC
BH CV XLRA MEAS RIGHT DIST CCA PSV: 78.9 CM/SEC
BH CV XLRA MEAS RIGHT DIST ICA EDV: -24.1 CM/SEC
BH CV XLRA MEAS RIGHT DIST ICA PSV: -87.8 CM/SEC
BH CV XLRA MEAS RIGHT ICA/CCA RATIO: 1.15
BH CV XLRA MEAS RIGHT MID ICA EDV: 18.4 CM/SEC
BH CV XLRA MEAS RIGHT MID ICA PSV: 65.9 CM/SEC
BH CV XLRA MEAS RIGHT PROX CCA EDV: -11.2 CM/SEC
BH CV XLRA MEAS RIGHT PROX CCA PSV: -78.9 CM/SEC
BH CV XLRA MEAS RIGHT PROX ECA EDV: -15.7 CM/SEC
BH CV XLRA MEAS RIGHT PROX ECA PSV: -129.4 CM/SEC
BH CV XLRA MEAS RIGHT PROX ICA EDV: -19.3 CM/SEC
BH CV XLRA MEAS RIGHT PROX ICA PSV: -90.7 CM/SEC
BH CV XLRA MEAS RIGHT PROX SCLA PSV: 176.7 CM/SEC
BH CV XLRA MEAS RIGHT VERTEBRAL A EDV: -21.7 CM/SEC
BH CV XLRA MEAS RIGHT VERTEBRAL A PSV: -138.7 CM/SEC
BH CVPROX LEFT ICA HIDDEN LRR: 1 CM
RIGHT ARM BP: NORMAL MMHG

## 2025-05-01 PROCEDURE — 93880 EXTRACRANIAL BILAT STUDY: CPT

## 2025-05-01 NOTE — TELEPHONE ENCOUNTER
Patient stated that she has experienced bleeding behind her right eye. She stated that her eye doctor ordered a carotid ultrasound which was completed today. Patient would like to know if this can be reviewed and if someone can reach back out to her to let her know if she needs to come in for an appt with Salo

## 2025-05-01 NOTE — TELEPHONE ENCOUNTER
Spoke with patient. She is anxious about her recent scans and was unsure if she should wait until her optometrist contacted her regarding carotid U/S done today. Since we are already following her carotids, we have decided to set patient up for a follow up with Dr. Leong to discuss her ultrasounds. Pt was advised to return call should this follow up not be warranted depending on results. This scan was just completed today, images and results have not been reviewed or released. She understands to return call should she no longer need an appt.

## 2025-05-08 ENCOUNTER — TELEPHONE (OUTPATIENT)
Age: 70
End: 2025-05-08
Payer: COMMERCIAL

## 2025-05-08 NOTE — TELEPHONE ENCOUNTER
Patient had carotid u/s on 5/1 as ordered by her optometrist (see previous telephone encounter). She reports the her OD called and discussed the results with her. A follow up was made related to this scan for patient to discuss with Dr. Leong, she no longer needs this appointment. She will keep her follow up in September for her ABIs and visit with APRN. All questions answered.     Telephone with Arias Leong MD (05/01/2025)

## 2025-05-08 NOTE — TELEPHONE ENCOUNTER
Patient called regarding a CTD doppler that was ordered by Lizeth. Patient stated that she had this scan done on April 17 with Dr. Sherwood's office. Imaging is in the patient's chart, and Dr. Sherwood called the patient with normal results.     Patient is wondering if she needs to have this repeated, or if the scan from April will be okay.     Call back is [phone]

## 2025-05-14 ENCOUNTER — OFFICE VISIT (OUTPATIENT)
Dept: CARDIOLOGY | Age: 70
End: 2025-05-14
Payer: COMMERCIAL

## 2025-05-14 VITALS
OXYGEN SATURATION: 95 % | BODY MASS INDEX: 27.4 KG/M2 | SYSTOLIC BLOOD PRESSURE: 148 MMHG | WEIGHT: 174.6 LBS | HEIGHT: 67 IN | DIASTOLIC BLOOD PRESSURE: 88 MMHG

## 2025-05-14 DIAGNOSIS — I73.9 PAD (PERIPHERAL ARTERY DISEASE): Chronic | ICD-10-CM

## 2025-05-14 DIAGNOSIS — Z72.0 TOBACCO ABUSE: Chronic | ICD-10-CM

## 2025-05-14 DIAGNOSIS — I10 ESSENTIAL HYPERTENSION: ICD-10-CM

## 2025-05-14 DIAGNOSIS — R60.0 PEDAL EDEMA: Primary | ICD-10-CM

## 2025-05-14 DIAGNOSIS — J44.9 COPD MIXED TYPE: Chronic | ICD-10-CM

## 2025-05-14 DIAGNOSIS — I47.10 PSVT (PAROXYSMAL SUPRAVENTRICULAR TACHYCARDIA): ICD-10-CM

## 2025-05-14 DIAGNOSIS — I49.1 PAC (PREMATURE ATRIAL CONTRACTION): ICD-10-CM

## 2025-05-14 PROCEDURE — 99214 OFFICE O/P EST MOD 30 MIN: CPT | Performed by: NURSE PRACTITIONER

## 2025-05-14 RX ORDER — VALSARTAN 160 MG/1
160 TABLET ORAL DAILY
Qty: 90 TABLET | Refills: 0 | Status: SHIPPED | OUTPATIENT
Start: 2025-05-14

## 2025-05-14 RX ORDER — HYDROCHLOROTHIAZIDE 25 MG/1
25 TABLET ORAL DAILY
Qty: 90 TABLET | Refills: 0 | Status: SHIPPED | OUTPATIENT
Start: 2025-05-14

## 2025-05-14 NOTE — PROGRESS NOTES
Date of Office Visit: 2025  Encounter Provider: BENITO Douglas  Place of Service: [facility]  Patient Name: Nelsy Stokes  :1955  Primary Cardiologist: Dr. Julito Bueno    Chief Complaint   Patient presents with    Follow-up   :     HPI: Nelsy Stokes is a 70 y.o. female who presents today for 6-month cardiac follow-up visit.  I reviewed her medical records.    She has been diagnosed with peripheral arterial disease (carotid artery stenosis, abdominal atherosclerotic disease, and left subclavian artery stenosis), hyperlipidemia, and is a current cigarette smoker.      In 2018, she was having chest pain and had a normal treadmill stress test.     In 2020, she was experiencing chest pain and shortness of breath.  Nuclear stress test showed a very small amount of mild ischemia in the distal inferior wall and felt to be a low risk and borderline stress study.  Echocardiogram showed EF 62%, aortic valve sclerosis without stenosis, trace MR, and trace to mild TR.    In 2022, she was noted to have frequent premature atrial contractions on EKG.  Holter monitor showed normal sinus rhythm, average heart rate 79 bpm, frequent episodes of SVT (3-11 beats in duration and highest rate 160 bpm), occasional APCs and rare PVCs.  I reassured her that we did not see heart rates in the 40s.  She was started on diltiazem 120 mg daily.  I recommend an echocardiogram and stress test, but she never had the testing completed.      She presents today for follow-up visit.  Earlier this year she had a CT scan completed which reported a small amount of pericardial fluid and Dr. Bueno felt that she was stable.  She reports shortness of breath, cough, and pedal edema.  She is still smoking 1 pack of cigarettes per day.  She denies chest pain, PND, orthopnea, palpitations, dizziness, syncope, or bleeding.  Her weight is up 8 pounds over the past year.   Blood pressure is running 140/80s at home.      Past Medical History:   Diagnosis Date    Anxiety     Aortic atherosclerosis     APC (atrial premature contractions) 10/14/2022    Arthritis     Asthma     Borderline diabetes     Carotid artery stenosis     left    Compression fracture of L2 lumbar vertebra     COPD (chronic obstructive pulmonary disease)     Coronary artery disease     GERD (gastroesophageal reflux disease)     History of bradycardia     History of chest pain     ECHO AND STRESS TEST IN EPIC FROM 2/2020    History of colon polyps     History of COVID-19 11/01/2022    Hyperlipidemia     Hypertension     Low back pain     Lumbosacral disc disease     PONV (postoperative nausea and vomiting)     PVD (peripheral vascular disease)     Subclavian artery stenosis     LEFT    Tobacco abuse        Past Surgical History:   Procedure Laterality Date    CAROTID STENT Left 12/21/2018    Left Transcervical Carotid stent    CHEST WALL BIOPSY  09/2022    CHOLECYSTECTOMY WITH INTRAOPERATIVE CHOLANGIOGRAM N/A 11/18/2020    Procedure: LAPAROSCOPIC CHOLECYSTECTOMY WITH INTRAOPERATIVE CHOLANGIOGRAM, OPEN UMBILICAL HERNIA REPAIR;  Surgeon: Maria D Ariza MD;  Location: [facility] MAIN OR;  Service: General;  Laterality: N/A;    COLONOSCOPY N/A 2017    ENDOSCOPY N/A 10/09/2020    Procedure: ESOPHAGOGASTRODUODENOSCOPY WITH COLD BIOPSIES;  Surgeon: Jacque Huber MD;  Location: [facility] ENDOSCOPY;  Service: Gastroenterology;  Laterality: N/A;  PRE: REFLUX  POST: ESOPHAGITIS, GASTRITIS, DUODENITIS    EXTRACORPOREAL SHOCK WAVE LITHOTRIPSY (ESWL)  09/09/2020    SUR[facility]ERY CENTER IN [place], [place]    KNEE CARTILAGE SURGERY Right 1990s    KYPHOPLASTY Bilateral 02/14/2023    Procedure: Lumbar 2 lumbar 3 kyphoplasty;  Surgeon: Ramon Kay MD;  Location: [facility] HYBRID OR 18/19;  Service: Neurosurgery;  Laterality: Bilateral;    VERTEBROPLASTY  2023       Social History     Socioeconomic History    Marital status:     Tobacco Use    Smoking status: Every Day     Current packs/day: 1.00     Average packs/day: 1 pack/day for 50.0 years (50.0 ttl pk-yrs)     Types: Cigarettes     Passive exposure: Current (last cig this am 0900)    Smokeless tobacco: Never    Tobacco comments:     caff use: 3 cups daily.  Began smoking at age 15.  Smoked .75 ppd for 7 years and otherwise 1 ppd for a 48.25 pack year history.   Vaping Use    Vaping status: Never Used   Substance and Sexual Activity    Alcohol use: Not Currently    Drug use: No    Sexual activity: Not Currently     Partners: Male     Comment:  had prostate cancer       Family History   Problem Relation Age of Onset    Hypertension Father     Lung disease Father     Heart attack Father 62    Anesthesia problems Father     Heart failure Father 88    Aneurysm Father         Abdominal Aortic Aneurysm    COPD Father     Atrial fibrillation Father     Gallbladder disease Father     Lung cancer Father 75    Prostate cancer Father     Cancer Sister     Gallbladder disease Sister     Heart disease Mother 83    Hypertension Mother     Atrial fibrillation Mother     Gallbladder disease Sister     Malig Hyperthermia Neg Hx        The following portion of the patient's history were reviewed and updated as appropriate: past medical history, past surgical history, past social history, past family history, allergies, current medications, and problem list.    Review of Systems   Constitutional: Positive for weight gain.   Cardiovascular:  Positive for dyspnea on exertion and leg swelling.   Respiratory:  Positive for cough and shortness of breath.    Hematologic/Lymphatic: Bruises/bleeds easily.   Neurological:  Positive for excessive daytime sleepiness.       Allergies   Allergen Reactions    Pitavastatin Myalgia         Current Outpatient Medications:     albuterol sulfate  (90 Base) MCG/ACT inhaler, Inhale 2 puffs Every 4 (Four) Hours As Needed for Wheezing., Disp: 24 g, Rfl: 3     "aspirin 81 MG EC tablet, Take 1 tablet by mouth Daily. TO HOLD 5 DAYS PRIOR TO OR PER MD INSTRUCTIONS, Disp: , Rfl:     Cholecalciferol 25 MCG (1000 UT) tablet, Take 1 tablet by mouth Daily. Takes two, Disp: , Rfl:     Cyanocobalamin (B-12) 1000 MCG lozenge, Place 1 tablet under the tongue Daily., Disp: 90 lozenge, Rfl: 1    Denosumab (PROLIA SC), Inject  under the skin into the appropriate area as directed Every 6 (Six) Months., Disp: , Rfl:     Evolocumab (REPATHA) solution prefilled syringe injection, Inject 1 mL under the skin into the appropriate area as directed Every 14 (Fourteen) Days. For cholesterol and peripheral artery disease, Disp: 2 mL, Rfl: 12    ezetimibe (ZETIA) 10 MG tablet, Take 1 tablet by mouth Daily. For cholesterol, Disp: 90 tablet, Rfl: 3    Fluticasone-Umeclidin-Vilant (Trelegy Ellipta) 100-62.5-25 MCG/INH inhaler, Inhale 1 puff Daily., Disp: 60 each, Rfl: 5    folic acid (FOLVITE) 1 MG tablet, Take 1 tablet by mouth Daily., Disp: 90 tablet, Rfl: 1    metFORMIN ER (GLUCOPHAGE-XR) 500 MG 24 hr tablet, 1 PO daily with food for impaired fasting glucose, Disp: 90 tablet, Rfl: 3    pantoprazole (Protonix) 40 MG EC tablet, One PO daily for GERD, Disp: 90 tablet, Rfl: 1    hydroCHLOROthiazide 25 MG tablet, Take 1 tablet by mouth Daily., Disp: 90 tablet, Rfl: 0   Diltiazem 120 mg 1 tablet daily    valsartan-HCTZ 80-12.5 mg 1 tablet daily          Objective:     Vitals:    05/14/25 0853   BP: 148/88   BP Location: Right arm   Patient Position: Sitting   Cuff Size: Adult   SpO2: 95%   Weight: 79.2 kg (174 lb 9.6 oz)   Height: 170.2 cm (67.01\")     Body mass index is 27.34 kg/m².    PHYSICAL EXAM:    Vitals Reviewed.   General Appearance: No acute distress, well developed and well nourished.    HENT: No hearing loss noted.    Respiratory: No signs of respiratory distress.  Lung sounds diminished.    Cardiovascular:  Jugular Venous Pressure: Normal  Heart Rate and Rhythm: Normal, Heart Sounds: Normal " S1 and S2. No S3 or S4 noted.  Murmurs: No murmurs noted. No rubs, thrills, or gallops.   Lower Extremities: Bilateral pedal edema noted.  Musculoskeletal: Normal movement of extremities.  Skin: General appearance normal.    Psychiatric: Patient alert and oriented to person, place, and time. Speech and behavior appropriate. Normal mood and affect.       ECG 12 Lead    Date/Time: 5/16/2025 9:00 AM  Performed by: Diane Bryant APRN    Authorized by: Diane Bryant APRN  Comparison: compared with previous ECG from 5/3/2024  Similar to previous ECG  Rhythm: sinus rhythm  Ectopy: atrial premature contractions  Rate: normal  BPM: 81  Conduction: left anterior fascicular block  QRS axis: normal  Other findings: non-specific ST-T wave changes    Clinical impression: abnormal EKG            Assessment:       Diagnosis Plan   1. Pedal edema        2. PSVT (paroxysmal supraventricular tachycardia)        3. PAC (premature atrial contraction)        4. Essential hypertension        5. PAD (peripheral artery disease)        6. COPD mixed type        7. Tobacco abuse                 Plan:       1/2.  PACs and paroxysmal supraventricular tachycardia noted on Holter monitor in 2022.  She did not tolerate diltiazem 240 mg daily.  Now she is experiencing pedal edema.  Stop diltiazem.  She has been asymptomatic with palpitations and we will reassess if she has any further symptoms.  PACs were noted on today's EKG.    3.  Pedal edema.  Stop diltiazem.    4.  Hypertension.  Stop valsartan-HCTZ combination.  Increase valsartan to 160 mg daily and HCTZ to 25 mg daily.  Follow low salt diet.     5.  Peripheral Arterial Disease: Followed by Dr. Zac Leong. Known subclavian stenosis, carotid stenosis, PAD of lower extremities.      6/7.  She has known COPD.  She would benefit from quitting smoking.    8.  Follow up with me in 2 weeks.     As always, it has been a pleasure to participate in your patient's care. Thank you.          Sincerely,         BENITO Salguero  [facility]      Dictated utilizing Dragon Dictation  COVID-19 Precautions - Patient was compliant in wearing a mask. When I saw the patient, I used appropriate personal protective equipment (PPE) including mask and eye shield (standard procedure).  Additionally, I used gown and gloves if indicated.  Hand hygiene was completed before and after seeing the patient.  I spent 30 minutes reviewing her medical records/testing/previous office notes/labs, face-to-face interaction with patient, physical examination, formulating the plan of care, and discussion of plan of care with patient.

## 2025-05-16 PROBLEM — I49.1 APC (ATRIAL PREMATURE CONTRACTIONS): Status: RESOLVED | Noted: 2022-10-14 | Resolved: 2025-05-16

## 2025-05-16 PROCEDURE — 93000 ELECTROCARDIOGRAM COMPLETE: CPT | Performed by: NURSE PRACTITIONER

## 2025-05-20 ENCOUNTER — TELEPHONE (OUTPATIENT)
Dept: CARDIOLOGY | Age: 70
End: 2025-05-20
Payer: COMMERCIAL

## 2025-05-20 NOTE — TELEPHONE ENCOUNTER
Patient called regarding her blood pressure since her medication changes. Patient states after she takes the BP meds she gets really dizzy and her blood pressure is dropping. This morning it was 91/40's and she has to lay down until the symptoms go away.     Patient took her blood pressure while on the phone and is is now 153/90 HR 89    Patient also states her water pill is good its just her blood pressure now

## 2025-05-21 RX ORDER — VALSARTAN 80 MG/1
40 TABLET ORAL NIGHTLY
Qty: 15 TABLET | Refills: 0
Start: 2025-05-21

## 2025-05-21 RX ORDER — VALSARTAN 80 MG/1
80 TABLET ORAL NIGHTLY
Start: 2025-05-21 | End: 2025-05-21 | Stop reason: SDUPTHER

## 2025-05-21 RX ORDER — HYDROCHLOROTHIAZIDE 25 MG/1
25 TABLET ORAL EVERY MORNING
Start: 2025-05-21

## 2025-05-21 NOTE — TELEPHONE ENCOUNTER
Notified patient of recommendations. Patient verbalized understanding.    Rylie Jefferson RN  Triage [facility]

## 2025-05-21 NOTE — TELEPHONE ENCOUNTER
Cut the valsartan to half tablet daily and take at nighttime.  Take the HCTZ in the morning.  Drink at least 64 ounces of water daily.

## 2025-05-21 NOTE — TELEPHONE ENCOUNTER
Permission for the hub to transfer this call directly to the nurse triage line at [facility].    Attempted to call patient, but no answer.  No option to leave a voicemail.  Will continue to try and reach patient.    Vicki Gomez RN  [facility] Triage  05/21/25 09:24 EDT

## 2025-05-22 NOTE — TELEPHONE ENCOUNTER
Patient was on 160mg of valsartan, so cutting it in half would be 80mg which is why I changed the prescription to 80mg.     Rylie Jefferson RN  Triage [facility]

## 2025-05-28 ENCOUNTER — RESULTS FOLLOW-UP (OUTPATIENT)
Dept: CARDIOLOGY | Age: 70
End: 2025-05-28

## 2025-05-28 ENCOUNTER — HOSPITAL ENCOUNTER (OUTPATIENT)
Dept: CARDIOLOGY | Facility: HOSPITAL | Age: 70
Discharge: HOME | End: 2025-05-28
Admitting: NURSE PRACTITIONER
Payer: COMMERCIAL

## 2025-05-28 ENCOUNTER — OFFICE VISIT (OUTPATIENT)
Dept: CARDIOLOGY | Age: 70
End: 2025-05-28
Payer: COMMERCIAL

## 2025-05-28 VITALS
BODY MASS INDEX: 27.47 KG/M2 | HEIGHT: 67 IN | WEIGHT: 175 LBS | SYSTOLIC BLOOD PRESSURE: 122 MMHG | HEART RATE: 73 BPM | DIASTOLIC BLOOD PRESSURE: 72 MMHG

## 2025-05-28 DIAGNOSIS — R60.0 LOWER EXTREMITY EDEMA: ICD-10-CM

## 2025-05-28 DIAGNOSIS — I49.1 PAC (PREMATURE ATRIAL CONTRACTION): ICD-10-CM

## 2025-05-28 DIAGNOSIS — E87.1 ACUTE HYPONATREMIA: Primary | ICD-10-CM

## 2025-05-28 DIAGNOSIS — I47.10 PSVT (PAROXYSMAL SUPRAVENTRICULAR TACHYCARDIA): ICD-10-CM

## 2025-05-28 DIAGNOSIS — I73.9 PAD (PERIPHERAL ARTERY DISEASE): Chronic | ICD-10-CM

## 2025-05-28 DIAGNOSIS — I10 ESSENTIAL HYPERTENSION: Primary | ICD-10-CM

## 2025-05-28 DIAGNOSIS — I77.1 SUBCLAVIAN ARTERIAL STENOSIS: Chronic | ICD-10-CM

## 2025-05-28 DIAGNOSIS — R06.02 SHORTNESS OF BREATH: ICD-10-CM

## 2025-05-28 DIAGNOSIS — I10 ESSENTIAL HYPERTENSION: ICD-10-CM

## 2025-05-28 DIAGNOSIS — J44.9 COPD MIXED TYPE: Chronic | ICD-10-CM

## 2025-05-28 DIAGNOSIS — Z72.0 TOBACCO ABUSE: Chronic | ICD-10-CM

## 2025-05-28 LAB
ANION GAP SERPL CALCULATED.3IONS-SCNC: 11.3 MMOL/L (ref 5–15)
BUN SERPL-MCNC: 9 MG/DL (ref 8–23)
BUN/CREAT SERPL: 11.5 (ref 7–25)
CALCIUM SPEC-SCNC: 9.6 MG/DL (ref 8.6–10.5)
CHLORIDE SERPL-SCNC: 92 MMOL/L (ref 98–107)
CO2 SERPL-SCNC: 26.7 MMOL/L (ref 22–29)
CREAT SERPL-MCNC: 0.78 MG/DL (ref 0.57–1)
EGFRCR SERPLBLD CKD-EPI 2021: 81.8 ML/MIN/1.73
GLUCOSE SERPL-MCNC: 108 MG/DL (ref 65–99)
POTASSIUM SERPL-SCNC: 4.4 MMOL/L (ref 3.5–5.2)
SODIUM SERPL-SCNC: 130 MMOL/L (ref 136–145)

## 2025-05-28 PROCEDURE — 80048 BASIC METABOLIC PNL TOTAL CA: CPT | Performed by: NURSE PRACTITIONER

## 2025-05-28 PROCEDURE — 36415 COLL VENOUS BLD VENIPUNCTURE: CPT

## 2025-05-28 PROCEDURE — 99214 OFFICE O/P EST MOD 30 MIN: CPT | Performed by: NURSE PRACTITIONER

## 2025-05-28 PROCEDURE — 93000 ELECTROCARDIOGRAM COMPLETE: CPT | Performed by: NURSE PRACTITIONER

## 2025-05-28 NOTE — PROGRESS NOTES
Date of Office Visit: 2025  Encounter Provider: BENITO Douglas  Place of Service: [facility]  Patient Name: Nelsy Stokes  :1955  Primary Cardiologist: Dr. Julito Bueno    Chief Complaint   Patient presents with    Follow-up    Hypertension   :     HPI: Nelsy Stokes is a 70 y.o. female who presents today for follow-up on blood pressure.  I reviewed her medical records.    She has been diagnosed with peripheral arterial disease (carotid artery stenosis, abdominal atherosclerotic disease, and left subclavian artery stenosis), hyperlipidemia, and is a current cigarette smoker.      In 2018, she was having chest pain and had a normal treadmill stress test.     In 2020, she was experiencing chest pain and shortness of breath.  Nuclear stress test showed a very small amount of mild ischemia in the distal inferior wall and felt to be a low risk and borderline stress study.  Echocardiogram showed EF 62%, aortic valve sclerosis without stenosis, trace MR, and trace to mild TR.    In 2022, she was noted to have frequent premature atrial contractions on EKG.  Holter monitor showed normal sinus rhythm, average heart rate 79 bpm, frequent episodes of SVT (3-11 beats in duration and highest rate 160 bpm), occasional APCs and rare PVCs.  I reassured her that we did not see heart rates in the 40s.  She was started on diltiazem 120 mg daily.  I recommend an echocardiogram and stress test, but she never had the testing completed.      Earlier this year she had a CT scan completed which reported a small amount of pericardial fluid and Dr. Bueno felt that she was stable.      I recently saw her in the office.  She reported shortness of breath, cough, and pedal edema.  BP was running 140s/80s at home.  I stopped diltiazem and combo valsartan-HCTZ.  I increased valsartan from 80 mg to 160 mg daily and HCTZ to 25 mg daily.  She then contacted  our office stating that she was lightheaded and blood pressure was systolic 90s.  She wanted to continue with the HCTZ and I reduced the valsartan to 80 mg daily.    She presents today for a follow-up visit.  Her blood pressure this morning at home was 130/64 and when I checked it it was 122/72.  Her wrist blood pressure machine was not accurate today.  Her lower extremity edema has improved off diltiazem.  She has chronic shortness of breath.  She denies chest pain, palpitations, dizziness, or syncope.      Past Medical History:   Diagnosis Date    Anxiety     Aortic atherosclerosis     APC (atrial premature contractions) 10/14/2022    Arthritis     Asthma     Borderline diabetes     Carotid artery stenosis     left    Compression fracture of L2 lumbar vertebra     COPD (chronic obstructive pulmonary disease)     Coronary artery disease     GERD (gastroesophageal reflux disease)     History of bradycardia     History of chest pain     ECHO AND STRESS TEST IN EPIC FROM 2/2020    History of colon polyps     History of COVID-19 11/01/2022    Hyperlipidemia     Hypertension     Low back pain     Lumbosacral disc disease     PONV (postoperative nausea and vomiting)     PVD (peripheral vascular disease)     Subclavian artery stenosis     LEFT    Tobacco abuse        Past Surgical History:   Procedure Laterality Date    CAROTID STENT Left 12/21/2018    Left Transcervical Carotid stent    CHEST WALL BIOPSY  09/2022    CHOLECYSTECTOMY WITH INTRAOPERATIVE CHOLANGIOGRAM N/A 11/18/2020    Procedure: LAPAROSCOPIC CHOLECYSTECTOMY WITH INTRAOPERATIVE CHOLANGIOGRAM, OPEN UMBILICAL HERNIA REPAIR;  Surgeon: Maria D Ariza MD;  Location: [facility] OR;  Service: General;  Laterality: N/A;    COLONOSCOPY N/A 2017    ENDOSCOPY N/A 10/09/2020    Procedure: ESOPHAGOGASTRODUODENOSCOPY WITH COLD BIOPSIES;  Surgeon: Jacque Huber MD;  Location: [facility] ENDOSCOPY;  Service: Gastroenterology;  Laterality: N/A;  PRE: REFLUX  POST:  ESOPHAGITIS, GASTRITIS, DUODENITIS    EXTRACORPOREAL SHOCK WAVE LITHOTRIPSY (ESWL)  09/09/2020    SUR[facility]ERY C[facility] IN [place], [place]    KNEE CARTILAGE SURGERY Right 1990s    KYPHOPLASTY Bilateral 02/14/2023    Procedure: Lumbar 2 lumbar 3 kyphoplasty;  Surgeon: Ramon Kay MD;  Location: [facility] OR 18/19;  Service: Neurosurgery;  Laterality: Bilateral;    VERTEBROPLASTY  2023       Social History     Socioeconomic History    Marital status:    Tobacco Use    Smoking status: Every Day     Current packs/day: 1.00     Average packs/day: 1 pack/day for 50.0 years (50.0 ttl pk-yrs)     Types: Cigarettes     Passive exposure: Current (last cig this am 0900)    Smokeless tobacco: Never    Tobacco comments:     Began smoking at age 15   Vaping Use    Vaping status: Never Used   Substance and Sexual Activity    Alcohol use: Not Currently    Drug use: No    Sexual activity: Not Currently     Partners: Male     Comment:  had prostate cancer       Family History   Problem Relation Age of Onset    Hypertension Father     Lung disease Father     Heart attack Father 62    Anesthesia problems Father     Heart failure Father 88    Aneurysm Father         Abdominal Aortic Aneurysm    COPD Father     Atrial fibrillation Father     Gallbladder disease Father     Lung cancer Father 75    Prostate cancer Father     Cancer Sister     Gallbladder disease Sister     Heart disease Mother 83    Hypertension Mother     Atrial fibrillation Mother     Gallbladder disease Sister     Malig Hyperthermia Neg Hx        The following portion of the patient's history were reviewed and updated as appropriate: past medical history, past surgical history, past social history, past family history, allergies, current medications, and problem list.    Review of Systems   Constitutional: Negative. Negative for weight gain.   Cardiovascular:  Positive for dyspnea on exertion and leg swelling.   Respiratory:  Positive for cough and  shortness of breath.    Hematologic/Lymphatic: Bruises/bleeds easily.   Neurological:  Positive for excessive daytime sleepiness.       Allergies   Allergen Reactions    Pitavastatin Myalgia     Current Outpatient Medications:     albuterol sulfate  (90 Base) MCG/ACT inhaler, Inhale 2 puffs Every 4 (Four) Hours As Needed for Wheezing., Disp: 24 g, Rfl: 3    aspirin 81 MG EC tablet, Take 1 tablet by mouth Daily. TO HOLD 5 DAYS PRIOR TO OR PER MD INSTRUCTIONS, Disp: , Rfl:     Cholecalciferol 25 MCG (1000 UT) tablet, Take 1 tablet by mouth Daily. Takes two, Disp: , Rfl:     Cyanocobalamin (B-12) 1000 MCG lozenge, Place 1 tablet under the tongue Daily., Disp: 90 lozenge, Rfl: 1    Denosumab (PROLIA SC), Inject  under the skin into the appropriate area as directed Every 6 (Six) Months., Disp: , Rfl:     Evolocumab (REPATHA) solution prefilled syringe injection, Inject 1 mL under the skin into the appropriate area as directed Every 14 (Fourteen) Days. For cholesterol and peripheral artery disease, Disp: 2 mL, Rfl: 12    ezetimibe (ZETIA) 10 MG tablet, Take 1 tablet by mouth Daily. For cholesterol, Disp: 90 tablet, Rfl: 3    Fluticasone-Umeclidin-Vilant (Trelegy Ellipta) 100-62.5-25 MCG/INH inhaler, Inhale 1 puff Daily., Disp: 60 each, Rfl: 5    folic acid (FOLVITE) 1 MG tablet, Take 1 tablet by mouth Daily., Disp: 90 tablet, Rfl: 1    hydroCHLOROthiazide 25 MG tablet, Take 1 tablet by mouth Every Morning., Disp: , Rfl:     metFORMIN ER (GLUCOPHAGE-XR) 500 MG 24 hr tablet, 1 PO daily with food for impaired fasting glucose, Disp: 90 tablet, Rfl: 3    pantoprazole (Protonix) 40 MG EC tablet, One PO daily for GERD, Disp: 90 tablet, Rfl: 1    valsartan (DIOVAN) 80 MG tablet, Take 1 tablet by mouth Daily., Disp: 90 tablet, Rfl: 1           Objective:     Vitals:    05/28/25 0832 05/28/25 0843 05/28/25 0845 05/28/25 0908   BP: 128/86 152/88 144/88 122/72   BP Location: Right arm Right arm Right arm Right arm   Patient  "Position: Sitting Sitting Sitting Sitting   Cuff Size: Large Adult Small Adult Small Adult Adult   Pulse: 73 78 73    Weight: 79.4 kg (175 lb)      Height: 170.2 cm (67\")        Body mass index is 27.41 kg/m².    PHYSICAL EXAM:    Vitals Reviewed.   General Appearance: No acute distress, well developed and well nourished.    HENT: No hearing loss noted.    Respiratory: No signs of respiratory distress.  Lung sounds diminished.    Cardiovascular:  Jugular Venous Pressure: Normal  Heart Rate and Rhythm: Normal, Heart Sounds: Normal S1 and S2. No S3 or S4 noted.  Murmurs: No murmurs noted. No rubs, thrills, or gallops.   Lower Extremities: No edema noted.  Purple discoloration of legs/feet.    Musculoskeletal: Normal movement of extremities.  Skin: General appearance normal.    Psychiatric: Patient alert and oriented to person, place, and time. Speech and behavior appropriate. Normal mood and affect.       ECG 12 Lead    Date/Time: 5/28/2025 8:32 AM  Performed by: Diane Bryant APRN    Authorized by: Diane Bryant APRN  Comparison: compared with previous ECG from 5/14/2025  Similar to previous ECG  Rhythm: sinus rhythm  Rate: normal  BPM: 73  Conduction: conduction normal  ST Segments: ST segments normal  T Waves: T waves normal  QRS axis: normal    Clinical impression: normal ECG            Assessment:       Diagnosis Plan   1. Essential hypertension  Basic Metabolic Panel      2. Subclavian arterial stenosis        3. Lower extremity edema        4. PAC (premature atrial contraction)        5. PSVT (paroxysmal supraventricular tachycardia)        6. PAD (peripheral artery disease)        7. COPD mixed type        8. Shortness of breath        9. Tobacco abuse                   Plan:       1/2.  Hypertension: Blood pressure is well-controlled on current medication regimen.  Repeat BMP today.  She has known left subclavian stenosis and blood pressures need to be checked in right arm only.    3.  Lower extremity " edema: Resolved off diltiazem.    4/5.  PACs and paroxysmal supraventricular tachycardia noted on Holter monitor in 2022.  Denies palpitations.  Previously treated with diltiazem.    5.  Peripheral Arterial Disease: Followed by Dr. Zac Leong. Known subclavian stenosis, carotid stenosis, PAD of lower extremities.      6/7/8.  She has known COPD and chronic shortness of breath.  She would benefit from quitting smoking.    9.  She will call with any cardiac concerns.  Follow-up with Dr. Bueno in 1 year.    As always, it has been a pleasure to participate in your patient's care. Thank you.         Sincerely,         BENITO Slaguero  [facility]      Dictated utilizing Dragon Dictation

## 2025-05-29 NOTE — PROGRESS NOTES
Please call patient.  BMP showed glucose 108.  Sodium level is low at 130 and chloride 92.  I would recommend stopping HCTZ because it is causing this.  For the lower extremity edema, would recommend leg elevation and low-salt diet.  Support hose will also help.  Recheck a BMP in another month off HCTZ.  Order placed.  Monitor blood pressures and call with concerns.

## 2025-05-29 NOTE — TELEPHONE ENCOUNTER
Notified patient of results/recommendations. Patient verbalized understanding.    Rylie Jefferson RN  Triage [facility] [place] [facility]

## 2025-05-29 NOTE — TELEPHONE ENCOUNTER
Stop HCTZ because of hyponatremia.   Recheck BMP in one month. Order placed.   Triage RN to contact patient.

## 2025-06-07 RX ORDER — VALSARTAN AND HYDROCHLOROTHIAZIDE 80; 12.5 MG/1; MG/1
1 TABLET, FILM COATED ORAL DAILY
Qty: 90 TABLET | Refills: 1 | OUTPATIENT
Start: 2025-06-07

## 2025-06-10 ENCOUNTER — TELEPHONE (OUTPATIENT)
Dept: CARDIOLOGY | Age: 70
End: 2025-06-10
Payer: COMMERCIAL

## 2025-06-10 RX ORDER — FUROSEMIDE 40 MG/1
40 TABLET ORAL DAILY
Qty: 30 TABLET | Refills: 0 | Status: SHIPPED | OUTPATIENT
Start: 2025-06-10

## 2025-06-10 NOTE — TELEPHONE ENCOUNTER
Reviewed recommendations with patient, verbalized understanding, will call with any further questions or complaints.  Pt states that she will start furosemide 40mg every morning as recommended.  Follow up scheduled as recommended.    Yolie White RN  Triage Nurse  06/10/25 14:41 EDT

## 2025-06-10 NOTE — TELEPHONE ENCOUNTER
Please triage this recent [employer] message.  If it is just 1 foot that is swollen, she needs to see her PCP.  If it is both feet, please make a follow-up appointment in our office so that we can further address.  Is she taking the HCTZ?  I did not see it on her medication list today.

## 2025-06-10 NOTE — TELEPHONE ENCOUNTER
I spoke with pt.  She states that this swelling is similar to when she saw you on 5/14 and 5/28, only it is getting worse.  It is right foot/ankle, and it does get worse when dependant and somewhat better whigh elevation.  She states that she has had ongoing hand swelling, and occasional face swelling lately as well.  She states that she had pain up to knee in that right leg when her foot got really swollen, but denies any currently.  She also states that there is redness in that right foot occasionally.  She does not weigh daily, and states that SOA is at baseline for her COPD.      She stopped HCTZ on 5/28 per your recommendations secondary to hyponatremia, with instructions to recheck BMP in one month.      Pt states that she will contact PCP to discuss swelling with her as well.  Any further recommendations?    Thanks so much,  Yolie White, RN  Triage RN  06/10/25 13:59 EDT

## 2025-06-23 ENCOUNTER — LAB (OUTPATIENT)
Dept: LAB | Facility: HOSPITAL | Age: 70
End: 2025-06-23
Payer: COMMERCIAL

## 2025-06-23 ENCOUNTER — OFFICE VISIT (OUTPATIENT)
Dept: CARDIOLOGY | Age: 70
End: 2025-06-23
Payer: COMMERCIAL

## 2025-06-23 VITALS
DIASTOLIC BLOOD PRESSURE: 60 MMHG | HEART RATE: 70 BPM | HEIGHT: 67 IN | WEIGHT: 171.6 LBS | SYSTOLIC BLOOD PRESSURE: 102 MMHG | OXYGEN SATURATION: 96 % | BODY MASS INDEX: 26.93 KG/M2

## 2025-06-23 DIAGNOSIS — I10 PRIMARY HYPERTENSION: Primary | ICD-10-CM

## 2025-06-23 DIAGNOSIS — Z72.0 TOBACCO ABUSE: Chronic | ICD-10-CM

## 2025-06-23 DIAGNOSIS — R06.02 SHORTNESS OF BREATH: ICD-10-CM

## 2025-06-23 DIAGNOSIS — J44.9 COPD MIXED TYPE: Chronic | ICD-10-CM

## 2025-06-23 DIAGNOSIS — E87.1 ACUTE HYPONATREMIA: ICD-10-CM

## 2025-06-23 DIAGNOSIS — R60.0 LOWER EXTREMITY EDEMA: ICD-10-CM

## 2025-06-23 DIAGNOSIS — I49.1 PAC (PREMATURE ATRIAL CONTRACTION): ICD-10-CM

## 2025-06-23 DIAGNOSIS — I73.9 PAD (PERIPHERAL ARTERY DISEASE): Chronic | ICD-10-CM

## 2025-06-23 DIAGNOSIS — I47.10 PSVT (PAROXYSMAL SUPRAVENTRICULAR TACHYCARDIA): ICD-10-CM

## 2025-06-23 DIAGNOSIS — I77.1 SUBCLAVIAN ARTERIAL STENOSIS: Chronic | ICD-10-CM

## 2025-06-23 LAB
ANION GAP SERPL CALCULATED.3IONS-SCNC: 12.3 MMOL/L (ref 5–15)
BUN SERPL-MCNC: 11 MG/DL (ref 8–23)
BUN/CREAT SERPL: 11.1 (ref 7–25)
CALCIUM SPEC-SCNC: 9.3 MG/DL (ref 8.6–10.5)
CHLORIDE SERPL-SCNC: 100 MMOL/L (ref 98–107)
CO2 SERPL-SCNC: 28.7 MMOL/L (ref 22–29)
CREAT SERPL-MCNC: 0.99 MG/DL (ref 0.57–1)
EGFRCR SERPLBLD CKD-EPI 2021: 61.5 ML/MIN/1.73
GLUCOSE SERPL-MCNC: 90 MG/DL (ref 65–99)
POTASSIUM SERPL-SCNC: 4.1 MMOL/L (ref 3.5–5.2)
SODIUM SERPL-SCNC: 141 MMOL/L (ref 136–145)

## 2025-06-23 PROCEDURE — 93000 ELECTROCARDIOGRAM COMPLETE: CPT | Performed by: NURSE PRACTITIONER

## 2025-06-23 PROCEDURE — 36415 COLL VENOUS BLD VENIPUNCTURE: CPT

## 2025-06-23 PROCEDURE — 99214 OFFICE O/P EST MOD 30 MIN: CPT | Performed by: NURSE PRACTITIONER

## 2025-06-23 PROCEDURE — 80048 BASIC METABOLIC PNL TOTAL CA: CPT

## 2025-06-23 RX ORDER — EVOLOCUMAB 140 MG/ML
INJECTION, SOLUTION SUBCUTANEOUS
COMMUNITY
Start: 2025-06-13

## 2025-06-23 RX ORDER — FUROSEMIDE 40 MG/1
40 TABLET ORAL DAILY
Qty: 90 TABLET | Refills: 3 | Status: SHIPPED | OUTPATIENT
Start: 2025-06-23

## 2025-06-23 NOTE — PROGRESS NOTES
Date of Office Visit: 2025  Encounter Provider: BENITO Douglas  Place of Service: [facility] CARDIOLOGY  Patient Name: Nelsy Stokes  :1955  Primary Cardiologist: Dr. Julito Bueno    Chief Complaint   Patient presents with    Follow-up   :     HPI: Nelsy Stokes is a 70 y.o. female who presents today for follow-up on lower extremity edema. I reviewed her medical records.    She has been diagnosed with peripheral arterial disease (carotid artery stenosis, abdominal atherosclerotic disease, and left subclavian artery stenosis) and hyperlipidemia.  She is a current cigarette smoker.     He had a normal treadmill stress test in 2018. In 2020, she was experiencing chest pain and shortness of breath. Nuclear stress test showed a very small amount of mild ischemia in the distal inferior wall and felt to be a low risk and borderline stress study.  Echocardiogram showed EF 62%, aortic valve sclerosis without stenosis, trace MR, and trace to mild TR.    In 2022, she was noted to have frequent premature atrial contractions on EKG.  Holter monitor showed normal sinus rhythm, average heart rate 79 bpm, frequent episodes of SVT (3-11 beats in duration and highest rate 160 bpm), occasional APCs and rare PVCs. I reassured her that we did not see heart rates in the 40s.  She was started on diltiazem 120 mg daily.  I recommended an echocardiogram and stress test, but she never had the testing completed.      Earlier this year she had a CT scan completed which reported a small amount of pericardial fluid and Dr. Bueno felt that she was stable.      In May 2025, saw her in the office for shortness of breath, cough, pedal edema, and elevated blood pressures.  I stopped diltiazem and combination valsartan-HCTZ.  I increased her valsartan and hydrochlorothiazide.  She reported that her systolic blood pressures were in the 90s and I reduced the valsartan  "dosage.  I then stopped the HCTZ because of hyponatremia.    On 6/10/2025, she reported right foot and ankle swelling that improved with elevation, hand swelling, and facial swelling.  She was started on furosemide 40 mg daily.    She presents today for a follow-up visit.  Her swelling has completely resolved with furosemide.  She had a BMP earlier today and the results are pending.  She continues to have shortness of breath, but feels that it may have improved \"a little\".  She denies chest pain, PND, orthopnea, palpitations, dizziness, syncope, or bleeding.  Blood pressure is running low and she really likes where her blood pressure is.      Past Medical History:   Diagnosis Date    Anxiety     Aortic atherosclerosis     APC (atrial premature contractions) 10/14/2022    Arthritis     Asthma     Borderline diabetes     Carotid artery stenosis     left    Compression fracture of L2 lumbar vertebra     COPD (chronic obstructive pulmonary disease)     Coronary artery disease     GERD (gastroesophageal reflux disease)     History of bradycardia     History of chest pain     ECHO AND STRESS TEST IN EPIC FROM 2/2020    History of colon polyps     History of COVID-19 11/01/2022    Hyperlipidemia     Hypertension     Low back pain     Lumbosacral disc disease     PONV (postoperative nausea and vomiting)     PVD (peripheral vascular disease)     Subclavian artery stenosis     LEFT    Tobacco abuse        Past Surgical History:   Procedure Laterality Date    CAROTID STENT Left 12/21/2018    Left Transcervical Carotid stent    CHEST WALL BIOPSY  09/2022    CHOLECYSTECTOMY WITH INTRAOPERATIVE CHOLANGIOGRAM N/A 11/18/2020    Procedure: LAPAROSCOPIC CHOLECYSTECTOMY WITH INTRAOPERATIVE CHOLANGIOGRAM, OPEN UMBILICAL HERNIA REPAIR;  Surgeon: Maria D Ariza MD;  Location: [facility];  Service: General;  Laterality: N/A;    COLONOSCOPY N/A 2017    ENDOSCOPY N/A 10/09/2020    Procedure: ESOPHAGOGASTRODUODENOSCOPY WITH COLD " BIOPSIES;  Surgeon: Jacque Huber MD;  Location: [facility] ENDOSCOPY;  Service: Gastroenterology;  Laterality: N/A;  PRE: REFLUX  POST: ESOPHAGITIS, GASTRITIS, DUODENITIS    EXTRACORPOREAL SHOCK WAVE LITHOTRIPSY (ESWL)  09/09/2020    [facility] IN [place]    KNEE CARTILAGE SURGERY Right 1990s    KYPHOPLASTY Bilateral 02/14/2023    Procedure: Lumbar 2 lumbar 3 kyphoplasty;  Surgeon: Ramon Kay MD;  Location: [facility] HYBRID OR 18/19;  Service: Neurosurgery;  Laterality: Bilateral;    VERTEBROPLASTY  2023       Social History     Socioeconomic History    Marital status:    Tobacco Use    Smoking status: Every Day     Current packs/day: 1.00     Average packs/day: 1 pack/day for 50.0 years (50.0 ttl pk-yrs)     Types: Cigarettes     Passive exposure: Current (last cig this am 0900)    Smokeless tobacco: Never    Tobacco comments:     Began smoking at age 15   Vaping Use    Vaping status: Never Used   Substance and Sexual Activity    Alcohol use: Not Currently    Drug use: No    Sexual activity: Not Currently     Partners: Male     Comment:  had prostate cancer       Family History   Problem Relation Age of Onset    Hypertension Father     Lung disease Father     Heart attack Father 62    Anesthesia problems Father     Heart failure Father 88    Aneurysm Father         Abdominal Aortic Aneurysm    COPD Father     Atrial fibrillation Father     Gallbladder disease Father     Lung cancer Father 75    Prostate cancer Father     Cancer Sister     Gallbladder disease Sister     Heart disease Mother 83    Hypertension Mother     Atrial fibrillation Mother     Gallbladder disease Sister     Malig Hyperthermia Neg Hx        The following portion of the patient's history were reviewed and updated as appropriate: past medical history, past surgical history, past social history, past family history, allergies, current medications, and problem list.    Review of Systems   Constitutional: Negative.  Negative for weight gain.   Cardiovascular:  Positive for dyspnea on exertion.   Respiratory:  Positive for shortness of breath.    Hematologic/Lymphatic: Bruises/bleeds easily.   Neurological:  Positive for excessive daytime sleepiness.       Allergies   Allergen Reactions    Pitavastatin Myalgia         Current Outpatient Medications:     albuterol sulfate  (90 Base) MCG/ACT inhaler, Inhale 2 puffs Every 4 (Four) Hours As Needed for Wheezing., Disp: 24 g, Rfl: 3    aspirin 81 MG EC tablet, Take 1 tablet by mouth Daily. TO HOLD 5 DAYS PRIOR TO OR PER MD INSTRUCTIONS, Disp: , Rfl:     Cholecalciferol 25 MCG (1000 UT) tablet, Take 1 tablet by mouth Daily. Takes two, Disp: , Rfl:     Cyanocobalamin (B-12) 1000 MCG lozenge, Place 1 tablet under the tongue Daily., Disp: 90 lozenge, Rfl: 1    Denosumab (PROLIA SC), Inject  under the skin into the appropriate area as directed Every 6 (Six) Months., Disp: , Rfl:     ezetimibe (ZETIA) 10 MG tablet, Take 1 tablet by mouth Daily. For cholesterol, Disp: 90 tablet, Rfl: 3    Fluticasone-Umeclidin-Vilant (Trelegy Ellipta) 100-62.5-25 MCG/INH inhaler, Inhale 1 puff Daily., Disp: 60 each, Rfl: 5    folic acid (FOLVITE) 1 MG tablet, Take 1 tablet by mouth Daily., Disp: 90 tablet, Rfl: 1    furosemide (LASIX) 40 MG tablet, Take 1 tablet by mouth Daily., Disp: 30 tablet, Rfl: 0    metFORMIN ER (GLUCOPHAGE-XR) 500 MG 24 hr tablet, 1 PO daily with food for impaired fasting glucose, Disp: 90 tablet, Rfl: 3    pantoprazole (Protonix) 40 MG EC tablet, One PO daily for GERD, Disp: 90 tablet, Rfl: 1    valsartan (DIOVAN) 80 MG tablet, Take 1 tablet by mouth Daily., Disp: 90 tablet, Rfl: 1    Repatha SureClick solution auto-injector SureClick injection, , Disp: , Rfl:             Objective:     Vitals:    06/23/25 0939   BP: 102/60   BP Location: Right arm   Patient Position: Sitting   Cuff Size: Adult   Pulse: 70   SpO2: 96%   Weight: 77.8 kg (171 lb 9.6 oz)   Height: 170.2 cm  "(67.01\")     Body mass index is 26.87 kg/m².    PHYSICAL EXAM:    Vitals Reviewed.   General Appearance: No acute distress, well developed and well nourished.    HENT: No hearing loss noted.    Respiratory: No signs of respiratory distress.  Lung sounds diminished.    Cardiovascular:  Jugular Venous Pressure: Normal  Heart Rate and Rhythm: Normal, Heart Sounds: Normal S1 and S2. No S3 or S4 noted.  Murmurs: No murmurs noted. No rubs, thrills, or gallops.   Lower Extremities: No edema noted.    Musculoskeletal: Normal movement of extremities.  Skin: General appearance normal.    Psychiatric: Patient alert and oriented to person, place, and time. Speech and behavior appropriate. Normal mood and affect.       ECG 12 Lead    Date/Time: 6/23/2025 9:33 AM  Performed by: Diane Bryant APRN    Authorized by: Diane Bryant APRN  Comparison: compared with previous ECG from 5/28/2025  Similar to previous ECG  Rhythm: sinus rhythm  Ectopy: atrial premature contractions  Rate: normal  BPM: 70  Conduction: conduction normal  T inversion: aVL  QRS axis: normal    Clinical impression: non-specific ECG            Assessment:      No diagnosis found.             Plan:       1/2.  Hypertension: Blood pressure on the low side of normal and she is very happy with her readings.  BMP results from today are pending.  She has known left subclavian stenosis and blood pressures need to be checked in right arm only.    3.  Lower extremity edema: Resolved off diltiazem and with the start of furosemide.    4/5.  PACs and paroxysmal supraventricular tachycardia noted on Holter monitor in 2022.  Denies palpitations.  Previously treated with diltiazem (discontinued because of lower extremity edema).    5.  Peripheral Arterial Disease: Followed by Dr. Zac Leong. Known subclavian stenosis, carotid stenosis, PAD of lower extremities.      6/7/8.  She has known COPD and chronic shortness of breath.  She would benefit from quitting " smoking.    9.  She is doing really well.  She will call with any cardiac concerns.  Follow-up with Dr. Bueno as scheduled.    As always, it has been a pleasure to participate in your patient's care. Thank you.         Sincerely,         BENITO Salguero  [facility] Cardiology      Dictated utilizing Dragon Dictation

## 2025-07-14 ENCOUNTER — TELEPHONE (OUTPATIENT)
Dept: FAMILY MEDICINE CLINIC | Facility: CLINIC | Age: 70
End: 2025-07-14
Payer: COMMERCIAL

## 2025-07-14 ENCOUNTER — TELEPHONE (OUTPATIENT)
Dept: FAMILY MEDICINE CLINIC | Facility: CLINIC | Age: 70
End: 2025-07-14

## 2025-07-14 DIAGNOSIS — M81.0 OSTEOPOROSIS OF LUMBAR SPINE: ICD-10-CM

## 2025-07-14 DIAGNOSIS — S32.020A COMPRESSION FRACTURE OF L2 VERTEBRA, INITIAL ENCOUNTER: ICD-10-CM

## 2025-07-14 DIAGNOSIS — M85.89 OSTEOPENIA OF MULTIPLE SITES: ICD-10-CM

## 2025-07-14 DIAGNOSIS — M80.80XS LOCALIZED OSTEOPOROSIS WITH CURRENT PATHOLOGICAL FRACTURE, SEQUELA: ICD-10-CM

## 2025-07-14 DIAGNOSIS — S32.030G CLOSED WEDGE COMPRESSION FRACTURE OF L3 VERTEBRA WITH DELAYED HEALING, SUBSEQUENT ENCOUNTER: Primary | ICD-10-CM

## 2025-07-14 NOTE — TELEPHONE ENCOUNTER
Patient called requesting lab work be put into her chart for PROLIA SHOT    Patient was last seen on 10/17/24    Best number to call back   [phone]

## 2025-07-15 NOTE — TELEPHONE ENCOUNTER
Can you put lab orders back in for this patient for her Prolia Injection.  She is coming in on 7/17/2025. There were lab orders in and I accidentally deleted them somehow, while trying to release them. I was trying to put them back in, but I cannot get them in correctly. Thank you

## 2025-07-16 DIAGNOSIS — M81.0 OSTEOPOROSIS OF LUMBAR SPINE: ICD-10-CM

## 2025-07-16 DIAGNOSIS — M85.89 OSTEOPENIA OF MULTIPLE SITES: ICD-10-CM

## 2025-07-16 DIAGNOSIS — S32.020A COMPRESSION FRACTURE OF L2 VERTEBRA, INITIAL ENCOUNTER: ICD-10-CM

## 2025-07-18 ENCOUNTER — RESULTS FOLLOW-UP (OUTPATIENT)
Dept: FAMILY MEDICINE CLINIC | Facility: CLINIC | Age: 70
End: 2025-07-18
Payer: COMMERCIAL

## 2025-07-18 LAB
25(OH)D3+25(OH)D2 SERPL-MCNC: 35.2 NG/ML (ref 30–100)
ALBUMIN SERPL-MCNC: 4.2 G/DL (ref 3.9–4.9)
ALP SERPL-CCNC: 57 IU/L (ref 44–121)
ALT SERPL-CCNC: 26 IU/L (ref 0–32)
AMBIG ABBREV CMP14 DEFAULT: NORMAL
AST SERPL-CCNC: 23 IU/L (ref 0–40)
BILIRUB SERPL-MCNC: 0.7 MG/DL (ref 0–1.2)
BUN SERPL-MCNC: 11 MG/DL (ref 8–27)
BUN/CREAT SERPL: 12 (ref 12–28)
CALCIUM SERPL-MCNC: 9.3 MG/DL (ref 8.7–10.3)
CHLORIDE SERPL-SCNC: 98 MMOL/L (ref 96–106)
CO2 SERPL-SCNC: 24 MMOL/L (ref 20–29)
CREAT SERPL-MCNC: 0.93 MG/DL (ref 0.57–1)
EGFRCR SERPLBLD CKD-EPI 2021: 66 ML/MIN/1.73
GLOBULIN SER CALC-MCNC: 2.2 G/DL (ref 1.5–4.5)
GLUCOSE SERPL-MCNC: 110 MG/DL (ref 70–99)
MAGNESIUM SERPL-MCNC: 1.9 MG/DL (ref 1.6–2.3)
PHOSPHATE SERPL-MCNC: 3.5 MG/DL (ref 3–4.3)
POTASSIUM SERPL-SCNC: 4.3 MMOL/L (ref 3.5–5.2)
PROT SERPL-MCNC: 6.4 G/DL (ref 6–8.5)
SODIUM SERPL-SCNC: 137 MMOL/L (ref 134–144)

## 2025-07-22 ENCOUNTER — INFUSION (OUTPATIENT)
Dept: ONCOLOGY | Facility: HOSPITAL | Age: 70
End: 2025-07-22
Payer: COMMERCIAL

## 2025-07-22 DIAGNOSIS — M85.89 OSTEOPENIA OF MULTIPLE SITES: Primary | ICD-10-CM

## 2025-07-22 DIAGNOSIS — S32.020A COMPRESSION FRACTURE OF L2 VERTEBRA, INITIAL ENCOUNTER: ICD-10-CM

## 2025-07-22 DIAGNOSIS — M81.0 OSTEOPOROSIS OF LUMBAR SPINE: ICD-10-CM

## 2025-07-22 PROCEDURE — 96372 THER/PROPH/DIAG INJ SC/IM: CPT

## 2025-07-22 PROCEDURE — 25010000002 DENOSUMAB 60 MG/ML SOLUTION PREFILLED SYRINGE: Performed by: PHYSICIAN ASSISTANT

## 2025-07-22 RX ADMIN — DENOSUMAB 60 MG: 60 INJECTION SUBCUTANEOUS at 10:31

## 2025-07-29 DIAGNOSIS — Z78.0 POST-MENOPAUSAL: Primary | ICD-10-CM

## 2025-07-30 RX ORDER — DOXYCYCLINE 100 MG/1
100 CAPSULE ORAL 2 TIMES DAILY
Qty: 20 CAPSULE | Refills: 0 | Status: SHIPPED | OUTPATIENT
Start: 2025-07-30

## 2025-08-04 ENCOUNTER — HOSPITAL ENCOUNTER (OUTPATIENT)
Dept: CT IMAGING | Facility: HOSPITAL | Age: 70
Discharge: HOME | End: 2025-08-04
Admitting: INTERNAL MEDICINE
Payer: COMMERCIAL

## 2025-08-04 DIAGNOSIS — R93.89 ABNORMAL CT OF THE CHEST: ICD-10-CM

## 2025-08-04 PROCEDURE — 71250 CT THORAX DX C-: CPT

## 2025-08-11 RX ORDER — HYDROCHLOROTHIAZIDE 25 MG/1
25 TABLET ORAL DAILY
Qty: 90 TABLET | Refills: 0 | OUTPATIENT
Start: 2025-08-11

## 2025-08-11 RX ORDER — VALSARTAN 160 MG/1
160 TABLET ORAL DAILY
Qty: 90 TABLET | Refills: 0 | OUTPATIENT
Start: 2025-08-11

## 2025-08-24 RX ORDER — EZETIMIBE 10 MG/1
10 TABLET ORAL DAILY
Qty: 90 TABLET | Refills: 3 | Status: SHIPPED | OUTPATIENT
Start: 2025-08-24

## (undated) DEVICE — SUT SILK 4/0 TIES 18IN A183H

## (undated) DEVICE — ANTIBACTERIAL UNDYED BRAIDED (POLYGLACTIN 910), SYNTHETIC ABSORBABLE SUTURE: Brand: COATED VICRYL

## (undated) DEVICE — AVIATOR PLUS .014 4.0X20 142CM: Brand: AVIATOR

## (undated) DEVICE — SPNG GZ WOVN 4X4IN 12PLY 10/BX STRL

## (undated) DEVICE — RADIFOCUS GLIDEWIRE: Brand: GLIDEWIRE

## (undated) DEVICE — PATIENT RETURN ELECTRODE, SINGLE-USE, CONTACT QUALITY MONITORING, ADULT, WITH 9FT CORD, FOR PATIENTS WEIGING OVER 33LBS. (15KG): Brand: MEGADYNE

## (undated) DEVICE — BITEBLOCK OMNI BLOC

## (undated) DEVICE — ENDOPATH XCEL BLUNT TIP TROCARS WITH SMOOTH SLEEVES: Brand: ENDOPATH XCEL

## (undated) DEVICE — SYR LL TP 10ML STRL

## (undated) DEVICE — CEMENT CARTRIDGES CC02A CDS: Brand: KYPHON® CEMENT DELIVERY SYSTEM

## (undated) DEVICE — ENDOPATH XCEL UNIVERSAL TROCAR STABLILITY SLEEVES: Brand: ENDOPATH XCEL

## (undated) DEVICE — SUT SILK 3/0 TIES 18IN A184H

## (undated) DEVICE — TOWEL,OR,DSP,ST,BLUE,STD,4/PK,20PK/CS: Brand: MEDLINE

## (undated) DEVICE — STRIP,CLOSURE,WOUND,MEDI-STRIP,1/2X4: Brand: MEDLINE

## (undated) DEVICE — ADHS SKIN DERMABOND TOP ADVANCED

## (undated) DEVICE — GW ENROUTE HC .014IN 95CM

## (undated) DEVICE — SMOKE EVACUATION TUBING WITH 7/8 IN TO 1/4 IN REDUCER: Brand: BUFFALO FILTER

## (undated) DEVICE — DRAPE,REIN 53X77,STERILE: Brand: MEDLINE

## (undated) DEVICE — PK ENDART CARTOID 40

## (undated) DEVICE — KT INTRO MIC TROC 4F 21GA 7CM

## (undated) DEVICE — APPL CHLORAPREP HI/LITE 26ML ORNG

## (undated) DEVICE — CONN TBG Y 5 IN 1 LF STRL

## (undated) DEVICE — 12" MICROBORE EXTENSION SET WITH REMOVABLE SLIDE CLAMP: Brand: IV PLUS®

## (undated) DEVICE — FRCP BX RADJAW4 NDL 2.8 240CM LG OG BX40

## (undated) DEVICE — STPCK 3WY D201 DISCOFIX

## (undated) DEVICE — SUT PROLN 5/0 RB1 D/A 36IN 8556H

## (undated) DEVICE — EQUIPMENT COVER BAG TYPE 48” X 36” (122CM X 91CM): Brand: EQUIPMENT COVER BAG TYPE

## (undated) DEVICE — SUT SILK 2/0 TIES 18IN A185H

## (undated) DEVICE — EXTENSION SET, MALE LUER LOCK ADAPTER WITH RETRACTABLE COLLAR

## (undated) DEVICE — APPL CHLORAPREP W/TINT 10.5ML PERC STRL

## (undated) DEVICE — INFLATION DEVICE: Brand: ENCORE™ 26

## (undated) DEVICE — ANGLED-TIP ARTERIAL SHEATH CONFIGURATION: Brand: ENROUTE TRANSCAROTID NEUROPROTECTION SYSTEM

## (undated) DEVICE — MSK PROC CURAPLEX O2 2/ADAPT 7FT

## (undated) DEVICE — DRSNG SURESITE WNDW 4X4.5

## (undated) DEVICE — SUT VIC 0/0 UR6 27IN DYED J603H

## (undated) DEVICE — CATH CHOLANG 4.5F18IN BRGNDY

## (undated) DEVICE — ENDOCUT SCISSOR TIP, DISPOSABLE: Brand: RENEW

## (undated) DEVICE — ENDOPOUCH RETRIEVER SPECIMEN RETRIEVAL BAGS: Brand: ENDOPOUCH RETRIEVER

## (undated) DEVICE — LN SMPL CO2 SHTRM SD STREAM W/M LUER

## (undated) DEVICE — GW TORQFLX SS .018IN 40CM

## (undated) DEVICE — CATH IV INSYTE AUTOGARD 14G 1 1/2IN ORNG

## (undated) DEVICE — KT ORCA ORCAPOD DISP STRL

## (undated) DEVICE — CEMENT GUN AND BONE FILLER CDS2A SISE 2: Brand: MEDTRONIC REUSABLE INSTRUMENTS

## (undated) DEVICE — SOL NACL 0.9PCT 1000ML

## (undated) DEVICE — ST ACC MICROPUNCTURE STFF .018 ECHO/PLDM/TP 4F/10CM 21G/7CM

## (undated) DEVICE — DRP C/ARM 41X74IN

## (undated) DEVICE — SUT VIC 3/0 SH 27IN J416H

## (undated) DEVICE — SUT SILK 3/0 SH CR5 18IN C0135

## (undated) DEVICE — GOWN ,SIRUS,NONREINFORCED SMALL: Brand: MEDLINE

## (undated) DEVICE — GLV SURG BIOGEL LTX PF 7 1/2

## (undated) DEVICE — PK KYPHOPLASTY 40

## (undated) DEVICE — 3M™ STERI-DRAPE™ INSTRUMENT POUCH 1018: Brand: STERI-DRAPE™

## (undated) DEVICE — SUT PROLN 6/0 BV1 D/A 30IN 8709H

## (undated) DEVICE — TUBING, SUCTION, 1/4" X 10', STRAIGHT: Brand: MEDLINE

## (undated) DEVICE — VISUALIZATION SYSTEM: Brand: CLEARIFY

## (undated) DEVICE — NDL HYPO PRECISIONGLIDE REG 25G 1 1/2

## (undated) DEVICE — APPL CHLORAPREP W/TINT 26ML ORNG

## (undated) DEVICE — TP UMB COTN RO 1/8X24IN U16G

## (undated) DEVICE — ADAPT CLN BIOGUARD AIR/H2O DISP

## (undated) DEVICE — NEEDLE, QUINCKE 22GX3.5": Brand: MEDLINE INDUSTRIES, INC.

## (undated) DEVICE — STPCK 3WY HP ROT

## (undated) DEVICE — GLV SURG SENSICARE POLYISPRN W/ALOE PF LF 6.5 GRN STRL

## (undated) DEVICE — BONE TAMP KIT KEX152NB AF E2 15/2: Brand: KYPHON EXPRESS II KYPHOPAK TRAY

## (undated) DEVICE — SENSR O2 OXIMAX FNGR A/ 18IN NONSTR

## (undated) DEVICE — 3M™ IOBAN™ 2 ANTIMICROBIAL INCISE DRAPE 6640EZ: Brand: IOBAN™ 2

## (undated) DEVICE — CONTAINER,SPECIMEN,OR STERILE,4OZ: Brand: MEDLINE

## (undated) DEVICE — SYR LUERLOK 5CC

## (undated) DEVICE — SUT VIC 4/0 SH 27IN J415H

## (undated) DEVICE — GLV SURG BIOGEL LTX PF 6

## (undated) DEVICE — GLV SURG BIOGEL LTX PF 8

## (undated) DEVICE — ENDOPATH XCEL BLADELESS TROCARS WITH STABILITY SLEEVES: Brand: ENDOPATH XCEL

## (undated) DEVICE — PENCL ES MEGADINE EZ/CLEAN BUTN W/HOLSTR 10FT

## (undated) DEVICE — BONE BIOPSY DEVICE F07A TAPERED SIZE 2: Brand: MEDTRONIC REUSABLE INSTRUMENTS

## (undated) DEVICE — IBT KIT KEX152EB-A FF E2 15/2 OI: Brand: KYPHON® EXPRESS™ OSTEO INTRODUCER® SYSTEM AND BFD

## (undated) DEVICE — LOU LAP CHOLE: Brand: MEDLINE INDUSTRIES, INC.

## (undated) DEVICE — LAPAROSCOPIC SMOKE FILTRATION SYSTEM: Brand: PALL LAPAROSHIELD® PLUS LAPAROSCOPIC SMOKE FILTRATION SYSTEM

## (undated) DEVICE — AVIATOR PLUS .014 5.0X20 142CM: Brand: AVIATOR